# Patient Record
Sex: FEMALE | Employment: UNEMPLOYED | URBAN - METROPOLITAN AREA
[De-identification: names, ages, dates, MRNs, and addresses within clinical notes are randomized per-mention and may not be internally consistent; named-entity substitution may affect disease eponyms.]

---

## 2018-06-14 DIAGNOSIS — E78.00 PURE HYPERCHOLESTEROLEMIA: ICD-10-CM

## 2018-06-14 DIAGNOSIS — E11.9 TYPE 2 DIABETES MELLITUS WITHOUT COMPLICATION, WITHOUT LONG-TERM CURRENT USE OF INSULIN (HCC): ICD-10-CM

## 2018-06-15 RX ORDER — ATORVASTATIN CALCIUM 20 MG/1
20 TABLET, FILM COATED ORAL DAILY
Qty: 30 TABLET | Refills: 3 | Status: SHIPPED | OUTPATIENT
Start: 2018-06-15 | End: 2019-01-16 | Stop reason: SDUPTHER

## 2018-07-27 ENCOUNTER — OFFICE VISIT (OUTPATIENT)
Dept: INTERNAL MEDICINE | Age: 43
End: 2018-07-27

## 2018-07-27 VITALS
TEMPERATURE: 97.9 F | DIASTOLIC BLOOD PRESSURE: 78 MMHG | WEIGHT: 145 LBS | BODY MASS INDEX: 28.47 KG/M2 | RESPIRATION RATE: 18 BRPM | HEART RATE: 80 BPM | SYSTOLIC BLOOD PRESSURE: 112 MMHG | HEIGHT: 60 IN

## 2018-07-27 DIAGNOSIS — E78.5 HYPERLIPIDEMIA, UNSPECIFIED HYPERLIPIDEMIA TYPE: ICD-10-CM

## 2018-07-27 DIAGNOSIS — E11.9 TYPE 2 DIABETES MELLITUS WITHOUT COMPLICATION, WITHOUT LONG-TERM CURRENT USE OF INSULIN (HCC): Primary | ICD-10-CM

## 2018-07-27 PROCEDURE — 99213 OFFICE O/P EST LOW 20 MIN: CPT | Performed by: INTERNAL MEDICINE

## 2018-07-27 PROCEDURE — 99212 OFFICE O/P EST SF 10 MIN: CPT | Performed by: INTERNAL MEDICINE

## 2018-07-27 ASSESSMENT — ENCOUNTER SYMPTOMS
COUGH: 0
ABDOMINAL PAIN: 0
SHORTNESS OF BREATH: 0
ALLERGIC/IMMUNOLOGIC NEGATIVE: 1
TROUBLE SWALLOWING: 0
VOICE CHANGE: 0
EYES NEGATIVE: 1
SORE THROAT: 0
CONSTIPATION: 0
VOMITING: 0

## 2018-07-27 ASSESSMENT — PATIENT HEALTH QUESTIONNAIRE - PHQ9
1. LITTLE INTEREST OR PLEASURE IN DOING THINGS: 0
SUM OF ALL RESPONSES TO PHQ9 QUESTIONS 1 & 2: 0
SUM OF ALL RESPONSES TO PHQ QUESTIONS 1-9: 0
2. FEELING DOWN, DEPRESSED OR HOPELESS: 0

## 2018-07-27 NOTE — PROGRESS NOTES
Subjective:      Patient ID: Lopez Campos is a 37 y.o. female with PMH of DM type II and HLD. HPI  Patient presented to [facility] for routine follow up. She is [place] speaking lady. She has no complain today. Taking her medication regularly. DM type II- comtrolled   Patient on Metformin 500mg BD  Taking medication regularly  Last HbA1c:6.3( 1/25/2018)  Repeat HbA1c today:6.6  Monitoring BS at home, range over:102-110  Follow low glucose Diet  Eye exam :not follow any opthalmology ? Insurance issue  Last Foot exam done on 9/2017  microalbumin : neg  Concern about hypoglycemic symptom, counseled regarding symptom. HLD-controlled  Taking Lipitor 20mg OD  Last lipid :wnl    Review of Systems   Constitutional: Negative for activity change, fatigue, fever and unexpected weight change. HENT: Negative for sore throat, trouble swallowing and voice change. Eyes: Negative. Respiratory: Negative for cough and shortness of breath. Cardiovascular: Negative for chest pain and leg swelling. Gastrointestinal: Negative for abdominal pain, constipation and vomiting. Endocrine: Negative. Genitourinary: Negative. Musculoskeletal: Negative. Skin: Negative. Allergic/Immunologic: Negative. Neurological: Negative for tremors, weakness, light-headedness and numbness. Hematological: Negative for adenopathy. Psychiatric/Behavioral: Negative. Objective:   Physical Exam   Constitutional: She is oriented to person, place, and time. She appears well-developed and well-nourished. HENT:   Head: Normocephalic and atraumatic. Mouth/Throat: Oropharynx is clear and moist.   Eyes: Conjunctivae are normal. Pupils are equal, round, and reactive to light. No scleral icterus. Neck: Normal range of motion. Neck supple. Cardiovascular: Normal rate, regular rhythm and normal heart sounds. Pulmonary/Chest: Effort normal and breath sounds normal. She has no wheezes. Abdominal: Soft.  Bowel sounds are normal.

## 2018-07-27 NOTE — PATIENT INSTRUCTIONS
Continue to test blood sugars daily as instructed   Continue same medications   [address] [facility] worker to see you in re: to seeing an Eye Dr. As recommended earlier  Return in 6 months as scheduled with Dr. Rupinder Pettit please call in December to schedule an appointment

## 2019-01-16 DIAGNOSIS — E11.9 TYPE 2 DIABETES MELLITUS WITHOUT COMPLICATION, WITHOUT LONG-TERM CURRENT USE OF INSULIN (HCC): ICD-10-CM

## 2019-01-16 DIAGNOSIS — E78.00 PURE HYPERCHOLESTEROLEMIA: ICD-10-CM

## 2019-01-17 RX ORDER — ATORVASTATIN CALCIUM 20 MG/1
20 TABLET, FILM COATED ORAL DAILY
Qty: 30 TABLET | Refills: 0 | Status: SHIPPED | OUTPATIENT
Start: 2019-01-17 | End: 2019-01-24 | Stop reason: SDUPTHER

## 2019-01-24 ENCOUNTER — OFFICE VISIT (OUTPATIENT)
Dept: INTERNAL MEDICINE | Age: 44
End: 2019-01-24

## 2019-01-24 VITALS
SYSTOLIC BLOOD PRESSURE: 128 MMHG | BODY MASS INDEX: 26.43 KG/M2 | TEMPERATURE: 98.8 F | RESPIRATION RATE: 16 BRPM | DIASTOLIC BLOOD PRESSURE: 80 MMHG | HEART RATE: 86 BPM | WEIGHT: 140 LBS | HEIGHT: 61 IN

## 2019-01-24 DIAGNOSIS — E11.9 TYPE 2 DIABETES MELLITUS WITHOUT COMPLICATION, WITHOUT LONG-TERM CURRENT USE OF INSULIN (HCC): ICD-10-CM

## 2019-01-24 DIAGNOSIS — E78.00 PURE HYPERCHOLESTEROLEMIA: ICD-10-CM

## 2019-01-24 LAB — HBA1C MFR BLD: 6.5 %

## 2019-01-24 PROCEDURE — 83036 HEMOGLOBIN GLYCOSYLATED A1C: CPT | Performed by: INTERNAL MEDICINE

## 2019-01-24 PROCEDURE — 99213 OFFICE O/P EST LOW 20 MIN: CPT | Performed by: INTERNAL MEDICINE

## 2019-01-24 RX ORDER — ATORVASTATIN CALCIUM 20 MG/1
20 TABLET, FILM COATED ORAL DAILY
Qty: 30 TABLET | Refills: 3 | Status: SHIPPED | OUTPATIENT
Start: 2019-01-24 | End: 2019-05-14 | Stop reason: SDUPTHER

## 2019-01-24 RX ORDER — LANCETS 33 GAUGE
EACH MISCELLANEOUS
Qty: 100 EACH | Refills: 3 | Status: SHIPPED | OUTPATIENT
Start: 2019-01-24 | End: 2019-05-14 | Stop reason: SDUPTHER

## 2019-01-24 RX ORDER — SYRING-NEEDL,DISP,INSUL,0.3 ML 30 GX5/16"
SYRINGE, EMPTY DISPOSABLE MISCELLANEOUS
Qty: 100 DEVICE | Refills: 3 | Status: SHIPPED | OUTPATIENT
Start: 2019-01-24

## 2019-01-24 ASSESSMENT — ENCOUNTER SYMPTOMS
COUGH: 0
SORE THROAT: 0
SHORTNESS OF BREATH: 0
GASTROINTESTINAL NEGATIVE: 1
EYES NEGATIVE: 1

## 2019-02-15 ENCOUNTER — TELEPHONE (OUTPATIENT)
Dept: OBGYN | Age: 44
End: 2019-02-15

## 2019-04-02 ENCOUNTER — TELEPHONE (OUTPATIENT)
Dept: INTERNAL MEDICINE | Age: 44
End: 2019-04-02

## 2019-04-02 NOTE — TELEPHONE ENCOUNTER
Reached out to patient to complete Pre-Charting. Unable to reach to patient, left a voicemail for patient to call me back to review active labs.

## 2019-05-14 ENCOUNTER — OFFICE VISIT (OUTPATIENT)
Dept: INTERNAL MEDICINE | Age: 44
End: 2019-05-14

## 2019-05-14 VITALS
HEART RATE: 86 BPM | SYSTOLIC BLOOD PRESSURE: 128 MMHG | DIASTOLIC BLOOD PRESSURE: 92 MMHG | WEIGHT: 143.7 LBS | HEIGHT: 60 IN | TEMPERATURE: 98.2 F | RESPIRATION RATE: 14 BRPM | OXYGEN SATURATION: 99 % | BODY MASS INDEX: 28.21 KG/M2

## 2019-05-14 DIAGNOSIS — E66.3 OVERWEIGHT: ICD-10-CM

## 2019-05-14 DIAGNOSIS — E78.00 PURE HYPERCHOLESTEROLEMIA: ICD-10-CM

## 2019-05-14 DIAGNOSIS — E11.9 TYPE 2 DIABETES MELLITUS WITHOUT COMPLICATION, WITHOUT LONG-TERM CURRENT USE OF INSULIN (HCC): Primary | ICD-10-CM

## 2019-05-14 LAB — HBA1C MFR BLD: 6.7 %

## 2019-05-14 PROCEDURE — 99212 OFFICE O/P EST SF 10 MIN: CPT | Performed by: INTERNAL MEDICINE

## 2019-05-14 PROCEDURE — 83036 HEMOGLOBIN GLYCOSYLATED A1C: CPT | Performed by: INTERNAL MEDICINE

## 2019-05-14 PROCEDURE — 99213 OFFICE O/P EST LOW 20 MIN: CPT | Performed by: INTERNAL MEDICINE

## 2019-05-14 RX ORDER — ATORVASTATIN CALCIUM 20 MG/1
20 TABLET, FILM COATED ORAL DAILY
Qty: 30 TABLET | Refills: 3 | Status: SHIPPED | OUTPATIENT
Start: 2019-05-14 | End: 2019-07-26 | Stop reason: SDUPTHER

## 2019-05-14 RX ORDER — LANCETS 33 GAUGE
EACH MISCELLANEOUS
Qty: 100 EACH | Refills: 3 | Status: SHIPPED | OUTPATIENT
Start: 2019-05-14

## 2019-05-14 ASSESSMENT — ENCOUNTER SYMPTOMS
DIARRHEA: 0
CONSTIPATION: 0
BACK PAIN: 0
BLOOD IN STOOL: 0
SHORTNESS OF BREATH: 0
PHOTOPHOBIA: 0
COUGH: 0
SORE THROAT: 0

## 2019-05-14 NOTE — PATIENT INSTRUCTIONS
Please continue your medicine, watch on diet, continue exercise, loose weight. Come back in 3 months. Call for questions or concerns.     Electronically signed by Livier Nam MD on 5/14/2019 at 2:48 PM

## 2019-05-14 NOTE — PROGRESS NOTES
[facility]  InternalMedicine Residency Program  ACC Note      SUBJECTIVE:  CC: had concerns including Diabetes (follow up). HPI:Ness Dillard 37years old female with hx of type 2 diabetes not on insulin presented to the [facility] for a routine visit. 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 She has No complains today. Denies headache, chest pain, SOB. She checks her sugar at home, runs about 102-110. She watch on her diet. Her A1C has been stable, today 6.7    Review of Systems   Constitutional: Negative for chills, fever and unexpected weight change. HENT: Negative for sore throat. Eyes: Negative for photophobia and visual disturbance. Respiratory: Negative for cough and shortness of breath. Cardiovascular: Negative for chest pain and leg swelling. Gastrointestinal: Negative for blood in stool, constipation and diarrhea. Genitourinary: Negative for dysuria and frequency. Musculoskeletal: Negative for back pain. Neurological: Negative for dizziness and light-headedness. Current Outpatient Medications on File Prior to Visit   Medication Sig Dispense Refill    metFORMIN (GLUCOPHAGE) 500 MG tablet Take 1 tablet by mouth 2 times daily (with meals) 60 tablet 3    atorvastatin (LIPITOR) 20 MG tablet Take 1 tablet by mouth daily 30 tablet 3    Lancet Device MISC Pt to check BS daily 100 Device 3    AGAMATRIX ULTRA-THIN LANCETS MISC Check BS daily 100 each 3    blood glucose test strips (AGAMATRIX AMP TEST) strip Pt to check BS daily. 100 each 3    Blood Glucose Monitoring Suppl (AGAMATRIX AMP) SREE 1 glucometer 1 Device 0     No current facility-administered medications on file prior to visit. She reported she had diabetic eye exam in [facility] one month ago, will obtain record.         OBJECTIVE:    VS: BP (!) 155/80 (Site: Left Upper Arm, Position: Sitting, Cuff Size: Medium Adult)   Pulse 86   Temp 98.2 °F (36.8 °C) (Oral) Resp 14   Ht 5' (1.524 m)   Wt 143 lb 11.2 oz (65.2 kg)   LMP 05/07/2019 (Approximate)   SpO2 99%   Breastfeeding? No   BMI 28.06 kg/m²    Repeated BP manually was 128/92    Physical Exam   Constitutional: She is oriented to person, place, and time. She appears well-developed and well-nourished. No distress. HENT:   Head: Normocephalic and atraumatic. Eyes: Pupils are equal, round, and reactive to light. Neck: Normal range of motion. Cardiovascular: Normal rate and regular rhythm. Pulmonary/Chest: Effort normal and breath sounds normal. No respiratory distress. Abdominal: Soft. There is no tenderness. Genitourinary: Vagina normal.   Musculoskeletal: Normal range of motion. She exhibits no edema. Neurological: She is alert and oriented to person, place, and time. Skin: Skin is warm. She is not diaphoretic. Psychiatric: She has a normal mood and affect. ASSESSMENT/PLAN:  Reba Castillo was seen today for diabetes. Diagnoses and all orders for this visit:    Type 2 diabetes mellitus without complication, without long-term current use of insulin ([address]  -     Lipid Panel; Future  -     POCT glycosylated hemoglobin (Hb A1C)  -     metFORMIN (GLUCOPHAGE) 500 MG tablet; Take 1 tablet by mouth 2 times daily (with meals)  -     blood glucose test strips (AGAMATRIX AMP TEST) strip; Pt to check BS daily.  -     AGAMATRIX ULTRA-THIN LANCETS MISC; Check BS daily    Pure hypercholesterolemia  -     Lipid Panel; Future  -     atorvastatin (LIPITOR) 20 MG tablet; Take 1 tablet by mouth daily    Health Maintenance        -     Diabetic eye exam was done in [facility] last month, will obtain record        RTC: come back to clinic in 3 months for regular follow up.      I have reviewed my findings and recommendations with Marcus Finney and Dr Emperatriz Wilhelm MD PGY-2   5/14/2019 2:29 PM

## 2019-05-14 NOTE — PROGRESS NOTES
Discharge instructions reviewed with patient per Dr. Viki Arthur. Patient directed to  to  AVS and schedule follow up appt.

## 2019-05-27 ENCOUNTER — HOSPITAL ENCOUNTER (EMERGENCY)
Age: 44
Discharge: HOME | End: 2019-05-28
Attending: EMERGENCY MEDICINE

## 2019-05-27 ENCOUNTER — APPOINTMENT (OUTPATIENT)
Dept: CT IMAGING | Age: 44
End: 2019-05-27

## 2019-05-27 VITALS
WEIGHT: 145 LBS | HEART RATE: 91 BPM | SYSTOLIC BLOOD PRESSURE: 167 MMHG | TEMPERATURE: 98.6 F | RESPIRATION RATE: 16 BRPM | OXYGEN SATURATION: 99 % | BODY MASS INDEX: 28.47 KG/M2 | DIASTOLIC BLOOD PRESSURE: 78 MMHG | HEIGHT: 60 IN

## 2019-05-27 DIAGNOSIS — N30.01 ACUTE CYSTITIS WITH HEMATURIA: Primary | ICD-10-CM

## 2019-05-27 LAB
ANION GAP SERPL CALCULATED.3IONS-SCNC: 11 MMOL/L (ref 7–16)
BACTERIA: ABNORMAL /HPF
BILIRUBIN URINE: NEGATIVE
BLOOD, URINE: ABNORMAL
BUN BLDV-MCNC: 15 MG/DL (ref 6–20)
CALCIUM SERPL-MCNC: 9.5 MG/DL (ref 8.6–10.2)
CHLORIDE BLD-SCNC: 100 MMOL/L (ref 98–107)
CLARITY: ABNORMAL
CO2: 25 MMOL/L (ref 22–29)
COLOR: YELLOW
CREAT SERPL-MCNC: 0.7 MG/DL (ref 0.5–1)
GFR AFRICAN AMERICAN: >60
GFR NON-AFRICAN AMERICAN: >60 ML/MIN/1.73
GLUCOSE BLD-MCNC: 129 MG/DL (ref 74–99)
GLUCOSE URINE: NEGATIVE MG/DL
HCG, URINE, POC: NEGATIVE
KETONES, URINE: NEGATIVE MG/DL
LEUKOCYTE ESTERASE, URINE: ABNORMAL
Lab: NORMAL
NEGATIVE QC PASS/FAIL: NORMAL
NITRITE, URINE: NEGATIVE
PH UA: 7 (ref 5–9)
POSITIVE QC PASS/FAIL: NORMAL
POTASSIUM SERPL-SCNC: 3.6 MMOL/L (ref 3.5–5)
PROTEIN UA: ABNORMAL MG/DL
RBC UA: ABNORMAL /HPF (ref 0–2)
SODIUM BLD-SCNC: 136 MMOL/L (ref 132–146)
SPECIFIC GRAVITY UA: 1.01 (ref 1–1.03)
UROBILINOGEN, URINE: 0.2 E.U./DL
WBC UA: >20 /HPF (ref 0–5)

## 2019-05-27 PROCEDURE — 81001 URINALYSIS AUTO W/SCOPE: CPT

## 2019-05-27 PROCEDURE — 99284 EMERGENCY DEPT VISIT MOD MDM: CPT

## 2019-05-27 PROCEDURE — 2580000003 HC RX 258: Performed by: EMERGENCY MEDICINE

## 2019-05-27 PROCEDURE — 6360000002 HC RX W HCPCS: Performed by: EMERGENCY MEDICINE

## 2019-05-27 PROCEDURE — 74176 CT ABD & PELVIS W/O CONTRAST: CPT

## 2019-05-27 PROCEDURE — 87186 SC STD MICRODIL/AGAR DIL: CPT

## 2019-05-27 PROCEDURE — 80048 BASIC METABOLIC PNL TOTAL CA: CPT

## 2019-05-27 PROCEDURE — 87088 URINE BACTERIA CULTURE: CPT

## 2019-05-27 PROCEDURE — 87077 CULTURE AEROBIC IDENTIFY: CPT

## 2019-05-27 PROCEDURE — 96374 THER/PROPH/DIAG INJ IV PUSH: CPT

## 2019-05-27 RX ORDER — 0.9 % SODIUM CHLORIDE 0.9 %
500 INTRAVENOUS SOLUTION INTRAVENOUS ONCE
Status: COMPLETED | OUTPATIENT
Start: 2019-05-27 | End: 2019-05-28

## 2019-05-27 RX ORDER — KETOROLAC TROMETHAMINE 30 MG/ML
15 INJECTION, SOLUTION INTRAMUSCULAR; INTRAVENOUS ONCE
Status: COMPLETED | OUTPATIENT
Start: 2019-05-27 | End: 2019-05-27

## 2019-05-27 RX ADMIN — SODIUM CHLORIDE 500 ML: 9 INJECTION, SOLUTION INTRAVENOUS at 22:34

## 2019-05-27 RX ADMIN — KETOROLAC TROMETHAMINE 15 MG: 30 INJECTION INTRAMUSCULAR; INTRAVENOUS at 22:34

## 2019-05-27 ASSESSMENT — PAIN SCALES - GENERAL: PAINLEVEL_OUTOF10: 10

## 2019-05-27 ASSESSMENT — PAIN DESCRIPTION - PAIN TYPE: TYPE: ACUTE PAIN

## 2019-05-27 ASSESSMENT — PAIN DESCRIPTION - LOCATION: LOCATION: ABDOMEN

## 2019-05-27 ASSESSMENT — PAIN DESCRIPTION - FREQUENCY: FREQUENCY: CONTINUOUS

## 2019-05-27 ASSESSMENT — PAIN DESCRIPTION - ORIENTATION: ORIENTATION: LOWER

## 2019-05-27 ASSESSMENT — PAIN DESCRIPTION - DESCRIPTORS: DESCRIPTORS: ACHING

## 2019-05-28 PROCEDURE — 6370000000 HC RX 637 (ALT 250 FOR IP): Performed by: EMERGENCY MEDICINE

## 2019-05-28 RX ORDER — CEFDINIR 300 MG/1
300 CAPSULE ORAL 2 TIMES DAILY
Qty: 14 CAPSULE | Refills: 0 | Status: SHIPPED | OUTPATIENT
Start: 2019-05-28 | End: 2019-06-04

## 2019-05-28 RX ORDER — ONDANSETRON 8 MG/1
8 TABLET, ORALLY DISINTEGRATING ORAL EVERY 8 HOURS PRN
Qty: 10 TABLET | Refills: 1 | Status: SHIPPED | OUTPATIENT
Start: 2019-05-28 | End: 2020-01-22 | Stop reason: ALTCHOICE

## 2019-05-28 RX ORDER — IBUPROFEN 800 MG/1
800 TABLET ORAL EVERY 8 HOURS PRN
Qty: 20 TABLET | Refills: 0 | Status: SHIPPED | OUTPATIENT
Start: 2019-05-28 | End: 2020-01-22 | Stop reason: ALTCHOICE

## 2019-05-28 RX ORDER — CEFDINIR 300 MG/1
300 CAPSULE ORAL ONCE
Status: COMPLETED | OUTPATIENT
Start: 2019-05-28 | End: 2019-05-28

## 2019-05-28 RX ADMIN — CEFDINIR 300 MG: 300 CAPSULE ORAL at 00:26

## 2019-05-28 NOTE — ED PROVIDER NOTES
HPI:  5/28/19, Time: 12:09 AM      Annie Verdugo is a 53 y.o. female presenting to the ED for dysuria, beginning 2 days ago. The complaint has been intermittent, moderate in severity, and worsened by voiding. No fever/chills, no nausea/vomiting/diarrhea. No hematemesis, no melena no hematochezia. No flank pain reported. No change in bowel habit patterns. No trauma nor falls. No relieving factors are reported. No chest heaviness/pressure/tightness no coughing, no shortness breath. No other complaints are reported. Review of Systems:   Pertinent positives and negatives are stated within HPI, all other systems reviewed and are negative.      --------------------------------------------- PAST HISTORY ---------------------------------------------  Past Medical History:  has a past medical history of Diabetes ([address] and Hyperlipidemia. Past Surgical History:  has no past surgical history on file. Social History:  reports that she has never smoked. She has never used smokeless tobacco. She reports that she does not drink alcohol or use drugs. Family History: family history includes Diabetes in her father and mother; High Blood Pressure in her mother. The patients home medications have been reviewed. Allergies: Patient has no known allergies.     -------------------------------------------------- RESULTS -------------------------------------------------  All laboratory and radiology results have been personally reviewed by myself   LABS:  Results for orders placed or performed during the hospital encounter of 05/27/19   Urinalysis   Result Value Ref Range    Color, UA Yellow Straw/Yellow    Clarity, UA SL CLOUDY Clear    Glucose, Ur Negative Negative mg/dL    Bilirubin Urine Negative Negative    Ketones, Urine Negative Negative mg/dL    Specific Gravity, UA 1.010 1.005 - 1.030    Blood, Urine MODERATE (A) Negative    pH, UA 7.0 5.0 - 9.0    Protein, UA TRACE Negative mg/dL    Urobilinogen, Urine 0.2 <2.0 E. U./dL    Nitrite, Urine Negative Negative    Leukocyte Esterase, Urine SMALL (A) Negative   Basic Metabolic Panel   Result Value Ref Range    Sodium 136 132 - 146 mmol/L    Potassium 3.6 3.5 - 5.0 mmol/L    Chloride 100 98 - 107 mmol/L    CO2 25 22 - 29 mmol/L    Anion Gap 11 7 - 16 mmol/L    Glucose 129 (H) 74 - 99 mg/dL    BUN 15 6 - 20 mg/dL    CREATININE 0.7 0.5 - 1.0 mg/dL    GFR Non-African American >60 >=60 mL/min/1.73    GFR African American >60     Calcium 9.5 8.6 - 10.2 mg/dL   Microscopic Urinalysis   Result Value Ref Range    WBC, UA >20 0 - 5 /HPF    RBC, UA 10-20 (A) 0 - 2 /HPF    Bacteria, UA RARE (A) /HPF   POC Pregnancy Urine Qual   Result Value Ref Range    HCG, Urine, POC Negative Negative    Lot Number [id number]     Positive QC Pass/Fail Pass     Negative QC Pass/Fail Pass        RADIOLOGY:  Interpreted by Radiologist.  CT ABDOMEN PELVIS WO CONTRAST Additional Contrast? None   Final Result   Mild thickening of the bladder wall to be correlated clinically. Otherwise unremarkable CT abdomen and pelvis without IV contrast.          ------------------------- NURSING NOTES AND VITALS REVIEWED ---------------------------    The nursing notes within the ED encounter and vital signs as below have been reviewed. BP (!) 167/78   Pulse 91   Temp 98.6 °F (37 °C) (Oral)   Resp 16   Ht 5' (1.524 m)   Wt 145 lb (65.8 kg)   LMP 05/07/2019 (Approximate)   SpO2 99%   BMI 28.32 kg/m²   Oxygen Saturation Interpretation: Normal      ---------------------------------------------------PHYSICAL EXAM--------------------------------------      Constitutional/General: Alert and oriented x3, well appearing, non toxic in mild distress  Head: Normocephalic and atraumatic  Eyes: PERRL, EOMI  Mouth: Oropharynx clear, handling secretions, no trismus  Neck: Supple, full ROM,   Pulmonary: Lungs clear to auscultation bilaterally, no wheezes, rales, or rhonchi.  Not in respiratory distress  Cardiovascular:  Regular rate and rhythm, no murmurs, gallops, or rubs. 2+ distal pulses  Abdomen: Soft, non tender, non distended, No HPSM, no masses, no rebound, no guarding, no rigidity. Normal BS. No CVA Tenderness bilaterally  Extremities: Moves all extremities x 4. Warm and well perfused  Skin: warm and dry without rash  Neurologic: GCS 15, CN's grossly intact, no focal deficits. Psych: Normal Affect    ------------------------------ ED COURSE/MEDICAL DECISION MAKING----------------------  Medications   cefdinir (OMNICEF) capsule 300 mg (has no administration in time range)   0.9 % sodium chloride bolus (500 mLs Intravenous New Bag 5/27/19 2234)   ketorolac (TORADOL) injection 15 mg (15 mg Intravenous Given 5/27/19 2234)       ED COURSE:     Medical Decision Making:   Differential Diagnoses:  UTI, Ureteral Calculus, Pyelonephritis, Acute Kidney Injury, to name a few. Pt. Has no CVA tenderness on exam to suggest Pyelonephritis. Counseling: The emergency provider has spoken with the patient and discussed todays results, in addition to providing specific details for the plan of care and counseling regarding the diagnosis and prognosis. Questions are answered at this time and they are agreeable with the plan.      --------------------------------- IMPRESSION AND DISPOSITION ---------------------------------    IMPRESSION  1. Acute cystitis with hematuria        DISPOSITION  Disposition: Discharge to home  Patient condition is stable      NOTE: This report was transcribed using voice recognition software.  Every effort was made to ensure accuracy; however, inadvertent computerized transcription errors may be present        Loy Padilla MD  05/28/19 0015

## 2019-05-30 LAB
ORGANISM: ABNORMAL
URINE CULTURE, ROUTINE: ABNORMAL
URINE CULTURE, ROUTINE: ABNORMAL

## 2019-07-09 ENCOUNTER — TELEPHONE (OUTPATIENT)
Dept: INTERNAL MEDICINE | Age: 44
End: 2019-07-09

## 2019-07-09 DIAGNOSIS — E78.00 PURE HYPERCHOLESTEROLEMIA: ICD-10-CM

## 2019-07-09 DIAGNOSIS — E11.9 TYPE 2 DIABETES MELLITUS WITHOUT COMPLICATION, WITHOUT LONG-TERM CURRENT USE OF INSULIN (HCC): ICD-10-CM

## 2019-07-09 RX ORDER — ATORVASTATIN CALCIUM 20 MG/1
20 TABLET, FILM COATED ORAL DAILY
Qty: 30 TABLET | Status: CANCELLED | OUTPATIENT
Start: 2019-07-09

## 2019-07-19 ENCOUNTER — HOSPITAL ENCOUNTER (OUTPATIENT)
Age: 44
Discharge: HOME | End: 2019-07-19

## 2019-07-19 DIAGNOSIS — E11.9 TYPE 2 DIABETES MELLITUS WITHOUT COMPLICATION, WITHOUT LONG-TERM CURRENT USE OF INSULIN (HCC): ICD-10-CM

## 2019-07-19 DIAGNOSIS — E78.00 PURE HYPERCHOLESTEROLEMIA: ICD-10-CM

## 2019-07-19 LAB
CHOLESTEROL, TOTAL: 149 MG/DL (ref 0–199)
HDLC SERPL-MCNC: 54 MG/DL
LDL CHOLESTEROL CALCULATED: 64 MG/DL (ref 0–99)
TRIGL SERPL-MCNC: 153 MG/DL (ref 0–149)
VLDLC SERPL CALC-MCNC: 31 MG/DL

## 2019-07-19 PROCEDURE — 80061 LIPID PANEL: CPT

## 2019-07-19 PROCEDURE — 36415 COLL VENOUS BLD VENIPUNCTURE: CPT

## 2019-07-26 ENCOUNTER — OFFICE VISIT (OUTPATIENT)
Dept: INTERNAL MEDICINE | Age: 44
End: 2019-07-26

## 2019-07-26 VITALS
WEIGHT: 144.2 LBS | DIASTOLIC BLOOD PRESSURE: 78 MMHG | TEMPERATURE: 98.2 F | HEIGHT: 61 IN | SYSTOLIC BLOOD PRESSURE: 131 MMHG | RESPIRATION RATE: 18 BRPM | HEART RATE: 76 BPM | BODY MASS INDEX: 27.23 KG/M2

## 2019-07-26 DIAGNOSIS — E78.00 PURE HYPERCHOLESTEROLEMIA: ICD-10-CM

## 2019-07-26 DIAGNOSIS — E11.9 TYPE 2 DIABETES MELLITUS WITHOUT COMPLICATION, WITHOUT LONG-TERM CURRENT USE OF INSULIN (HCC): ICD-10-CM

## 2019-07-26 PROCEDURE — 99212 OFFICE O/P EST SF 10 MIN: CPT | Performed by: INTERNAL MEDICINE

## 2019-07-26 PROCEDURE — 99213 OFFICE O/P EST LOW 20 MIN: CPT | Performed by: INTERNAL MEDICINE

## 2019-07-26 RX ORDER — ATORVASTATIN CALCIUM 20 MG/1
20 TABLET, FILM COATED ORAL DAILY
Qty: 30 TABLET | Refills: 3 | Status: SHIPPED | OUTPATIENT
Start: 2019-07-26 | End: 2019-11-25 | Stop reason: SDUPTHER

## 2019-07-26 RX ORDER — IBUPROFEN 800 MG/1
800 TABLET ORAL EVERY 8 HOURS PRN
Qty: 20 TABLET | Status: CANCELLED | OUTPATIENT
Start: 2019-07-26 | End: 2019-08-02

## 2019-07-26 ASSESSMENT — ENCOUNTER SYMPTOMS
SHORTNESS OF BREATH: 0
COUGH: 0
BACK PAIN: 0
ABDOMINAL PAIN: 0

## 2019-07-29 PROBLEM — E11.9 TYPE 2 DIABETES MELLITUS WITHOUT COMPLICATION, WITHOUT LONG-TERM CURRENT USE OF INSULIN (HCC): Status: ACTIVE | Noted: 2019-07-29

## 2019-07-29 PROBLEM — E78.00 PURE HYPERCHOLESTEROLEMIA: Status: ACTIVE | Noted: 2019-07-29

## 2019-11-25 ENCOUNTER — TELEPHONE (OUTPATIENT)
Dept: INTERNAL MEDICINE | Age: 44
End: 2019-11-25

## 2019-11-25 DIAGNOSIS — E11.9 TYPE 2 DIABETES MELLITUS WITHOUT COMPLICATION, WITHOUT LONG-TERM CURRENT USE OF INSULIN (HCC): ICD-10-CM

## 2019-11-25 DIAGNOSIS — E78.00 PURE HYPERCHOLESTEROLEMIA: ICD-10-CM

## 2019-11-26 RX ORDER — ATORVASTATIN CALCIUM 20 MG/1
20 TABLET, FILM COATED ORAL DAILY
Qty: 30 TABLET | Refills: 1 | Status: SHIPPED
Start: 2019-11-26 | End: 2020-02-06 | Stop reason: SDUPTHER

## 2019-12-03 ENCOUNTER — TELEPHONE (OUTPATIENT)
Dept: INTERNAL MEDICINE | Age: 44
End: 2019-12-03

## 2020-01-22 ENCOUNTER — OFFICE VISIT (OUTPATIENT)
Dept: INTERNAL MEDICINE | Age: 45
End: 2020-01-22

## 2020-01-22 VITALS
HEART RATE: 88 BPM | HEIGHT: 61 IN | SYSTOLIC BLOOD PRESSURE: 145 MMHG | RESPIRATION RATE: 18 BRPM | BODY MASS INDEX: 27.17 KG/M2 | TEMPERATURE: 98.9 F | DIASTOLIC BLOOD PRESSURE: 86 MMHG | WEIGHT: 143.9 LBS

## 2020-01-22 LAB — HBA1C MFR BLD: 6.6 %

## 2020-01-22 PROCEDURE — 99213 OFFICE O/P EST LOW 20 MIN: CPT | Performed by: INTERNAL MEDICINE

## 2020-01-22 PROCEDURE — 6360000002 HC RX W HCPCS

## 2020-01-22 PROCEDURE — 83036 HEMOGLOBIN GLYCOSYLATED A1C: CPT | Performed by: INTERNAL MEDICINE

## 2020-01-22 PROCEDURE — 90686 IIV4 VACC NO PRSV 0.5 ML IM: CPT

## 2020-01-22 PROCEDURE — G0008 ADMIN INFLUENZA VIRUS VAC: HCPCS

## 2020-01-22 PROCEDURE — 99212 OFFICE O/P EST SF 10 MIN: CPT | Performed by: INTERNAL MEDICINE

## 2020-01-22 RX ORDER — AMLODIPINE BESYLATE 5 MG/1
5 TABLET ORAL DAILY
Qty: 30 TABLET | Refills: 3 | Status: SHIPPED
Start: 2020-01-22 | End: 2020-03-18 | Stop reason: SDUPTHER

## 2020-01-22 RX ORDER — BLOOD PRESSURE TEST KIT
KIT MISCELLANEOUS
Qty: 1 KIT | Refills: 0 | Status: SHIPPED | OUTPATIENT
Start: 2020-01-22

## 2020-01-22 RX ORDER — BLOOD PRESSURE TEST KIT
KIT MISCELLANEOUS
Qty: 1 KIT | Refills: 0 | Status: SHIPPED | OUTPATIENT
Start: 2020-01-22 | End: 2020-01-22

## 2020-01-22 SDOH — ECONOMIC STABILITY: INCOME INSECURITY: HOW HARD IS IT FOR YOU TO PAY FOR THE VERY BASICS LIKE FOOD, HOUSING, MEDICAL CARE, AND HEATING?: NOT VERY HARD

## 2020-01-22 SDOH — ECONOMIC STABILITY: FOOD INSECURITY: WITHIN THE PAST 12 MONTHS, YOU WORRIED THAT YOUR FOOD WOULD RUN OUT BEFORE YOU GOT MONEY TO BUY MORE.: NEVER TRUE

## 2020-01-22 SDOH — ECONOMIC STABILITY: FOOD INSECURITY: WITHIN THE PAST 12 MONTHS, THE FOOD YOU BOUGHT JUST DIDN'T LAST AND YOU DIDN'T HAVE MONEY TO GET MORE.: NEVER TRUE

## 2020-01-22 ASSESSMENT — PATIENT HEALTH QUESTIONNAIRE - PHQ9
1. LITTLE INTEREST OR PLEASURE IN DOING THINGS: 0
2. FEELING DOWN, DEPRESSED OR HOPELESS: 0
SUM OF ALL RESPONSES TO PHQ QUESTIONS 1-9: 0
SUM OF ALL RESPONSES TO PHQ9 QUESTIONS 1 & 2: 0
SUM OF ALL RESPONSES TO PHQ QUESTIONS 1-9: 0

## 2020-01-22 ASSESSMENT — ENCOUNTER SYMPTOMS
BACK PAIN: 0
VOMITING: 0
NAUSEA: 0
ABDOMINAL PAIN: 0
RECTAL PAIN: 0
COUGH: 0
DIARRHEA: 0

## 2020-01-22 NOTE — PROGRESS NOTES
Vaccine Information Sheet, \"Influenza - Inactivated\"  given to [facility], or parent/legal guardian of  [facility] and verbalized understanding. Patient responses:    Have you ever had a reaction to a flu vaccine? No  Do you have any current illness? No  Have you ever had [place] Syndrome? No  Do you have a serious allergy to any of the follow: Neomycin, Polymyxin, Thimerosal, eggs or egg products? No    Flu vaccine given per order. Please see immunization tab. Risks and benefits explained. Current VIS given.  Yoshi Almazan #[id number] used to gather information on pt and Dr to do assessment       Patient verbalized understanding of office instructions. She will call with questions or concerns. She was given discharge instructions,  Flu vaccine per ordered without no distress noted printed VIS given in [place]  All questions were fully answered.  Instructed to stop at  for printed AVS in [place]

## 2020-01-22 NOTE — PROGRESS NOTES
[facility]  InternalMedicine Residency Program  [facility] Note      SUBJECTIVE:    Andre العراقي presented to the [facility] for a routine visit     number: [id number]    Complaints today include occasional  headache., she happened to check her blood pressure at that time and has noticed higher systolic reading in the 852'D . Otherwise patient states no major issues since last visit. Review of Systems   Constitutional: Negative for fatigue and unexpected weight change. HENT: Negative for congestion. Eyes: Negative for visual disturbance. Respiratory: Negative for cough. Cardiovascular: Negative for chest pain and leg swelling. Gastrointestinal: Negative for abdominal pain, diarrhea, nausea, rectal pain and vomiting. Genitourinary: Negative for difficulty urinating, frequency and menstrual problem. Musculoskeletal: Negative for back pain and joint swelling. Neurological: Positive for headaches. Negative for dizziness, tremors, weakness and numbness. Psychiatric/Behavioral: Negative for agitation. Current Outpatient Medications on File Prior to Visit   Medication Sig Dispense Refill    atorvastatin (LIPITOR) 20 MG tablet Take 1 tablet by mouth daily 30 tablet 1    blood glucose test strips (AGAMATRIX AMP TEST) strip Pt to check BS daily. 100 each 3    AGAMATRIX ULTRA-THIN LANCETS MISC Check BS daily 100 each 3    Lancet Device MISC Pt to check BS daily 100 Device 3    Blood Glucose Monitoring Suppl (AGAMATRIX AMP) SREE 1 glucometer 1 Device 0     No current facility-administered medications on file prior to visit. OBJECTIVE:    VS: BP (!) 145/86   Pulse 88   Temp 98.9 °F (37.2 °C) (Oral)   Resp 18   Ht 5' 1\" (1.549 m)   Wt 143 lb 14.4 oz (65.3 kg)   LMP 01/07/2020   BMI 27.19 kg/m²   Physical Exam  Constitutional:       Appearance: Normal appearance. HENT:      Head: Normocephalic and atraumatic.       Right Ear: Tympanic membrane normal.

## 2020-02-06 RX ORDER — ATORVASTATIN CALCIUM 20 MG/1
20 TABLET, FILM COATED ORAL DAILY
Qty: 30 TABLET | Refills: 1 | Status: SHIPPED
Start: 2020-02-06 | End: 2020-02-12 | Stop reason: SDUPTHER

## 2020-02-12 ENCOUNTER — TELEPHONE (OUTPATIENT)
Dept: INTERNAL MEDICINE | Age: 45
End: 2020-02-12

## 2020-02-12 RX ORDER — ATORVASTATIN CALCIUM 20 MG/1
20 TABLET, FILM COATED ORAL DAILY
Qty: 30 TABLET | Refills: 1 | Status: SHIPPED
Start: 2020-02-12 | End: 2020-03-18 | Stop reason: SDUPTHER

## 2020-03-18 RX ORDER — AMLODIPINE BESYLATE 5 MG/1
5 TABLET ORAL DAILY
Qty: 30 TABLET | Refills: 3 | Status: SHIPPED
Start: 2020-03-18 | End: 2020-08-26 | Stop reason: ALTCHOICE

## 2020-03-18 RX ORDER — ATORVASTATIN CALCIUM 20 MG/1
20 TABLET, FILM COATED ORAL DAILY
Qty: 30 TABLET | Refills: 3 | Status: SHIPPED
Start: 2020-03-18 | End: 2020-08-26 | Stop reason: ALTCHOICE

## 2020-03-18 NOTE — TELEPHONE ENCOUNTER
Last Appointment:  1/22/2020  No future appointments.     Patient would like 90 supplies on medications

## 2020-03-18 NOTE — TELEPHONE ENCOUNTER
Patient LM at 12:08. Refills needed Atorvastatin, Metformin and Amlodipine.   [facility] 7430

## 2020-03-23 ENCOUNTER — TELEPHONE (OUTPATIENT)
Dept: INTERNAL MEDICINE | Age: 45
End: 2020-03-23

## 2020-08-25 ENCOUNTER — TELEPHONE (OUTPATIENT)
Dept: INTERNAL MEDICINE | Age: 45
End: 2020-08-25

## 2020-08-25 NOTE — PROGRESS NOTES
[facility]  [facility] Note      SUBJECTIVE:  CC: had concerns including Diabetes and Medication Refill. Adore Chi presented to the [facility] for a routine visit for follow up of DM and HTN   no: [id number]  She stated that she is feeling well today. Systolic blood pressure ranging around 110-120 mmHg. Blood sugar at home ranging between 100-120 mg/dl. No hypoglycemia. HbA1C 6.5% today    She stopped taking amlodipine 5 months ago due to side effects- leg swelling, facial flushing. She denies any nausea, vomiting, headache, dizziness, chest pain    Review Of Systems:  General: no fevers, chills, weight loss or gain. Ears/Nose/Throat: no hearing loss, tinnitus, vertigo, nosebleed, nasal congestion, rhinorrhea, sore throat  Respiratory: no cough, pleuritic chest pain, dyspnea, or wheezing  Cardiovascular: no chest pain, angina, dyspnea on exertion, orthopnea, PND, palpitations, or claudication  Gastrointestinal: no nausea, vomiting, heartburn, diarrhea, constipation, abdominal pain, hematochezia or melena  Genitourinary: no urinary urgency, frequency, dysuria, nocturia, hesitancy, or incontinence  Musculoskeletal: no arthritis, arthralgia, myalgia, weakness, or morning stiffness  Skin: no abnormal pigmentation, rash, itching, masses, hair or nail changes    Outpatient Medications Marked as Taking for the 8/26/20 encounter (Office Visit) with Anand Green MD   Medication Sig Dispense Refill    metFORMIN (GLUCOPHAGE) 500 MG tablet Take 1 tablet by mouth 2 times daily (with meals) 60 tablet 3    atorvastatin (LIPITOR) 40 MG tablet Take 1 tablet by mouth daily 90 tablet 1    lisinopril (PRINIVIL;ZESTRIL) 5 MG tablet Take 1 tablet by mouth daily 30 tablet 3       I have reviewed all pertinent PMHx, PSHx, FamHx, SocialHx, medications, and allergies and updated history as appropriate.     OBJECTIVE:    VS: /81   Pulse 68   Temp 97.7 °F (36.5 °C) (Temporal)   Resp 16   Ht 5' 1\" (1.549 m)   Wt 145 lb (65.8 kg)   BMI 27.40 kg/m²   General appearance: Alert, Awake, Oriented times 3, no distress  Lungs: Lungs clear to auscultation bilaterally. No rhonchi, crackles or wheezes  Heart: S1 S2  Regular rate and rhythm. No rub, murmur or gallop  Abdomen: Abdomen soft, non-tender. non-distended BS normal. No masses, organomegaly, no guarding rebound or rigidity. Extremities: No edema, Peripheral pulses palpable 2/4  Neuro: alert, awake, no gross focal neurologic deficit    ASSESSMENT/PLAN:  Type II DM  Controlled  HbA1c at goal- repeat today: 6.6%--> 6.5% today (8/2020)  Will check MICROALBUMIN / CREATININE URINE RATIO  Continue Metformin 500 mg twice daily  Will check BASIC METABOLIC PANEL  Diabetic Foot Exam done today, 10/10, normal  Continue lifestyle modifications      Essential Hypertension  Blood pressure today 200/99 mmHg  Systolic blood pressure at home usually <130 mmHg with some reading >140 mmHg  Was started on Amlodipine in 1/2020 but she stopped taking after one month due to bilateral leg swelling and facial flushing  Started on Lisinopril 5 mg daily; will also be power protective  Advised to check blood pressure regularly at home    Hyperlipidemia   High intensity statin continued  Last LDL 64 (2019)  Will check lipid panel  Atorvastatin increased to 40 mg from 20 mg today     Breast cancer screening  EWELINA DIGITAL DIAGNOSTIC BILATERAL PER PROTOCOL; Future    Overweight (BMI 25.0-29. 9)  Advised on life style modification    Health Maintenance:  Diabetic foot exam (8/26/2020)  Diabetic eye exam: 2 years ago; advised to follow up  Hep B Vaccine Adult (ENGERIX-B) (8/26/2020)  Health Maintenance Due   Topic Date Due    Diabetic retinal exam  05/28/1983    Diabetic microalbuminuria test  01/25/2019    Potassium monitoring  05/27/2020    Creatinine monitoring  05/27/2020    Lipid screen  07/19/2020     RTC: in 4 weeks for blood pressure check and labs    I have reviewed my findings and recommendations with Paco Rueda and Dr Tiffany Ray MD PGY-2  8/26/2020 12:31 PM

## 2020-08-25 NOTE — TELEPHONE ENCOUNTER
Pre-visit planning call to discuss patient care during COVID-19 pandemic. Discussed with the patient  / left message. Offered video and e-visits through [employer] to facilitate patient care continuity. Last office visit date: 1/22/2020  Outstanding labs/imaging: reviewed  Patient transport: community transport (remind patient to call to cancel transport) / private vehicle    Discussed that if the patient develops viral symptoms (fever, chills, body aches, soar throat, abdominal pain or diarrhea) OR has exposure to a sick contact they are to call the [facility] at [phone] for evaluation at one of three locations:    [facility] at [address]: [place], [address]    Patient agreed to telephone encounter and this will be forwarded to clerical staff for scheduling.     [facility]

## 2020-08-26 ENCOUNTER — OFFICE VISIT (OUTPATIENT)
Dept: INTERNAL MEDICINE | Age: 45
End: 2020-08-26

## 2020-08-26 VITALS
DIASTOLIC BLOOD PRESSURE: 81 MMHG | WEIGHT: 145 LBS | BODY MASS INDEX: 27.38 KG/M2 | SYSTOLIC BLOOD PRESSURE: 130 MMHG | HEIGHT: 61 IN | RESPIRATION RATE: 16 BRPM | TEMPERATURE: 97.7 F | HEART RATE: 68 BPM

## 2020-08-26 PROBLEM — I10 ESSENTIAL HYPERTENSION: Status: ACTIVE | Noted: 2020-08-26

## 2020-08-26 LAB — HBA1C MFR BLD: 6.5 %

## 2020-08-26 PROCEDURE — 99212 OFFICE O/P EST SF 10 MIN: CPT | Performed by: INTERNAL MEDICINE

## 2020-08-26 PROCEDURE — G0010 ADMIN HEPATITIS B VACCINE: HCPCS

## 2020-08-26 PROCEDURE — 90740 HEPB VACC 3 DOSE IMMUNSUP IM: CPT

## 2020-08-26 PROCEDURE — 6360000002 HC RX W HCPCS

## 2020-08-26 PROCEDURE — 99214 OFFICE O/P EST MOD 30 MIN: CPT | Performed by: INTERNAL MEDICINE

## 2020-08-26 PROCEDURE — 83036 HEMOGLOBIN GLYCOSYLATED A1C: CPT | Performed by: INTERNAL MEDICINE

## 2020-08-26 RX ORDER — ATORVASTATIN CALCIUM 40 MG/1
40 TABLET, FILM COATED ORAL DAILY
Qty: 90 TABLET | Refills: 1 | Status: SHIPPED
Start: 2020-08-26 | End: 2020-12-09 | Stop reason: SDUPTHER

## 2020-08-26 RX ORDER — LISINOPRIL 5 MG/1
5 TABLET ORAL DAILY
Qty: 30 TABLET | Refills: 3 | Status: SHIPPED
Start: 2020-08-26 | End: 2020-12-09 | Stop reason: ALTCHOICE

## 2020-08-26 NOTE — PROGRESS NOTES
I saw and examined the patient with Dr. Courtney Aragon. Patient here for Hypertension and blood pressure check. She has been checking her blood sugars at home and they are running between 100s-130s. She is also measuring her pressures at home and they are running between SBP 120s-140s. She denies any headaches, visual changes, chest pains, shortness of breath, nausea, vomiting or abdominal pain. She is complaint with her medications. Past medical, surgical, family history reviewed. Medications and allergies reviewed. Exam as per resident exam which reflects my own exam.    Vitals:    08/26/20 0807   BP: 130/81   Pulse: 68   Resp: 16   Temp: 97.7 °F (36.5 °C)   TempSrc: Temporal   Weight: 145 lb (65.8 kg)   Height: 5' 1\" (1.549 m)       I reviewed patient labs. Assessment/Plan:  1. Diabetes Mellitus Type II controlled non insulin dependent  2. Hypertension  3. Hyperlipidemia  4. Overweight  5. Breast cancer screening     Hba1c <7 today. Continue on metformin. Check lipid panel and bmp as well as microalbumin. Patient had a foot exam today and will be getting her eye exam next month with ophthalmology. Check mammogram. Will give hepatitis b vaccine today. Discussed assessment and plan with resident. See their note for further details.      Sarah Ann, DO

## 2020-08-26 NOTE — PATIENT INSTRUCTIONS
· Be compliant with medications  · Start taking Lisinopril 5 mg daily  · Start taking Atorvastatin 40 mg daily  · Please have labs done before coming to next visit  · Check blood pressure at home and maintain a blood pressure log and bring the log to the next clinic appointment  · Follow up in 4 weeks   Patient Education        Presión arterial larry: Instrucciones de cuidado  High Blood Pressure: Care Instructions  Instrucciones de cuidado    Si billings presión arterial suele ser superior a 130/80, usted tiene presión arterial larry o hipertensión. [place] significa que el número de [place] es 130 o más alto o que el número de abajo es [de-identified] o [address], o ambas cosas. A pesar de lo que mucha gente garrett, la hipertensión no suele causar dolor de perez ni provocar mareos o aturdimiento. Generalmente, no presenta síntomas. Sin embargo, aumenta el riesgo de ataque al corazón, ataque cerebral, y daños en los riñones o en los ojos. A mayor presión arterial, mayor riesgo. Billings médico le dará christianne meta para billings presión arterial. Billings meta se basará en billings elsie y billings edad. Los cambios de estilo de virginie, rey alimentarse en forma saludable y [place], siempre son importantes para ayudarle a bajar la presión arterial. También podría citlalli medicamentos para lograr billings meta para la presión arterial.  La atención de seguimiento es christianne parte clave de billings tratamiento y seguridad. Asegúrese de hacer y acudir a todas las citas, y llame a billings médico si está teniendo problemas. También es christianne buena idea saber los resultados de pawan exámenes y mantener christianne lista de los medicamentos que padmaja. ¿Cómo puede cuidarse en el hogar? Tratamiento médico  Si janel de citlalli pawan medicamentos, billings presión arterial volverá a subir. Es posible que deba citlalli un tipo de [place], o más de [place], para reducir la presión arterial. Sea justin con los medicamentos. [place] billings medicamento exactamente rey se lo hayan indicado.  Llame a billings médico si garrett estar teniendo problemas con billings medicamento. Hable con billings médico antes de empezar a citlalli [employer]. La aspirina puede ayudar a determinadas personas a reducir billings riesgo de tener un ataque cardíaco o un ataque cerebral. Bruno citlalli aspirina no es adecuado para todo el TRENGEREID, debido a que puede causar sangrado grave. Visite a billings médico periódicamente. Usted podría necesitar consultar a billings médico con más frecuencia al principio o hasta que se reduzca billings presión arterial.  Si usted está tomando medicamentos para la presión arterial, hable con billings médico antes de citlalli medicamentos descongestionantes o antiinflamatorios, rey ibuprofeno. Algunos de estos medicamentos pueden elevar la presión arterial.  Aprenda a revisarse la presión arterial en billings hogar. Cambios en el estilo de virginie  Mantenga un peso saludable. [place] es particularmente importante si aumenta de peso en la sebastian de la cintura. Bajar solo 10 libras (4.5 kg) puede ayudarle a reducir billings presión arterial.  Abelardo más ejercicios si billings médico se lo recomienda. Caminar es christianne buena opción. Poco a poco, aumente la distancia que [employer]. Intente hacer por lo menos 30 minutos de ejercicio la mayoría de los días de la [place]. También podría nadar, montar en bicicleta o hacer otras actividades. No tome alcohol o limite la cantidad que quinn. Hable con billings médico acerca de si puede citlalli alcohol. Trate de limitar la cantidad de sodio que consume a menos de 2,300 miligramos (mg) al día. Billings médico podría pedirle que trate de consumir menos de 1,500 mg al día. Coma abundantes frutas (rey bananas [plátanos] y naranjas), verduras, legumbres, granos integrales y productos lácteos de bajo contenido de Dyll[place] dewayne. Reduzca la cantidad de grasas saturadas en billings Avie Meena. Los productos derivados de los [place], rey la [place], el queso y la carne, contienen grasas saturadas.  El limitar estos alimentos podría ayudarle a bajar de peso y [place] reducir billings riesgo de christianne enfermedad cardíaca. No fume. El hábito de fumar aumenta billings riesgo de ataque cerebral y ataque al corazón. Si necesita ayuda para dejar de fumar, hable con billings médico sobre programas y medicamentos para dejar de fumar. Estos pueden aumentar pawan probabilidades de dejar el hábito para siempre. ¿Cuándo debe pedir ayuda? Llame al  911 en cualquier momento que considere que necesita atención de [place]. [place] puede significar que tiene síntomas que sugieren que billings presión arterial le está causando un problema cardíaco o vascular grave. Billings presión arterial podría ser superior a 180/110. Por ejemplo, llame al 911 si:  Tiene síntomas de un ataque al corazón. Estos pueden incluir:  Dolor o presión en el pecho, o christianne sensación extraña en el pecho. Sudoración. Falta de aire. Náuseas o vómito. Dolor, presión o christianne sensación extraña en la espalda, el bijan, la mandíbula, la parte superior del abdomen o en mark o ambos hombros o brazos. Aturdimiento o debilidad repentina. Latidos del corazón rápidos o irregulares. Tiene síntomas de un ataque cerebral. Estos pueden incluir:  Entumecimiento, hormigueo, debilidad o parálisis repentinos en la [place], el brazo o la pierna, sobre todo en un solo lado del cuerpo. Cambios repentinos en la visión. Dificultades repentinas para hablar. Confusión repentina o dificultad súbita para comprender frases sencillas. Problemas repentinos para caminar o mantener el equilibrio. Dolor de [place] intenso y repentino, distinto de los samantha de [place]. Tiene un dolor intenso en la espalda o el abdomen. No espere a que la presión arterial baje por sí bill. Obtenga ayuda de inmediato. Llame a billings médico ahora mismo o busque atención de inmediato si:  Tiene la presión arterial mucho más larry de lo normal (rey 180/110 o más larry), carol no tiene síntomas. Piensa que la presión arterial larry le está provocando síntomas, rey:  Dolor de perez intenso. Leonela [place].   Vigile de cerca los [facility], y asegúrese de comunicarse con billings médico si:  Billings presión arterial mide 140/90 o más en al menos 2 ocasiones. [place] significa que el número de [place] es 140 o superior o el número de abajo es 90 o superior, o ambas cosas. Valentina que podría estar teniendo efectos secundarios de los medicamentos para la presión arterial.  Billings presión arterial suele ser normal, carol se eleva por encima de lo normal en al menos 2 ocasiones. ¿Dónde puede encontrar más información en inglés? Gisel Russohamel a [email or web address] e ingrese a billings cuenta de MyChart. Karen Car en el [facility] \"Search Health Information\" para más información (en inglés) sobre \"Presión arterial larry: Instrucciones de cuidado. \"     Si no tiene christianne cuenta, santa jayant en el enlace \"Sign Up Now\". Revisado: [facility], [address]               OWRSJOI del contenido: 12.5  © 8981-8982 Healthwise, Incorporated. Las instrucciones de cuidado fueron adaptadas bajo licencia por [facility] ([facility]). Si usted tiene [place] afección médica o sobre estas instrucciones, siempre pregunte a billings profesional de elsie. Healthwise, Incorporated niega toda garantía o responsabilidad por billings uso de esta información.

## 2020-08-26 NOTE — PROGRESS NOTES
Discharge instructions reviewed with patient per . Follow up appt scheduled and AVS mailed to patient.       [id number] used to room patient    Referral for mammogram    POCT A1C    Pt tolerated Hep B vaccine given IM right deltoid Mother believes dosage for ADD meds needs to be decreased.  Pl adv

## 2020-09-15 ENCOUNTER — HOSPITAL ENCOUNTER (OUTPATIENT)
Dept: GENERAL RADIOLOGY | Age: 45
Discharge: HOME | End: 2020-09-17

## 2020-09-15 PROCEDURE — 77063 BREAST TOMOSYNTHESIS BI: CPT

## 2020-12-08 NOTE — PROGRESS NOTES
PND, palpitations, or claudication  Gastrointestinal: no nausea, vomiting, heartburn, diarrhea, constipation, abdominal pain, hematochezia or melena  Genitourinary: no urinary urgency, frequency, dysuria, nocturia, hesitancy, or incontinence  Musculoskeletal: no arthritis, arthralgia, myalgia, weakness, or morning stiffness  Skin: no abnormal pigmentation, rash, itching, masses, hair or nail changes    Outpatient Medications Marked as Taking for the 12/9/20 encounter (Virtual Visit) with Daly Jha MD   Medication Sig Dispense Refill    metFORMIN (GLUCOPHAGE) 500 MG tablet Take 1 tablet by mouth 2 times daily (with meals) 60 tablet 3    atorvastatin (LIPITOR) 40 MG tablet Take 1 tablet by mouth daily 90 tablet 1    fluticasone (FLONASE) 50 MCG/ACT nasal spray 1 spray by Each Nostril route daily 1 Bottle 0    pantoprazole (PROTONIX) 20 MG tablet Take 1 tablet by mouth every morning (before breakfast) 30 tablet 0    losartan (COZAAR) 25 MG tablet Take 1 tablet by mouth daily 90 tablet 1       I have reviewed all pertinent PMHx, PSHx, FamHx, SocialHx, medications, and allergies and updated history as appropriate.     OBJECTIVE:    Physical examination not done as this was a telephone encounter    ASSESSMENT/PLAN:  Rhinitis  Reports runny nose, dry cough;  also sick  No fever, myalgia, anosmia  COVID ambulatory test ordered  Advised to maintain self quarantine for 10 days since symptom onset  Flonase nasal spray once daily for nasal stuffiness  Symptoms improving with over the counter decongestants    Type II DM  Controlled  HbA1c at goal- repeat today: 6.6%--> 6.5% (8/2020), next check in february  Continue Metformin 500 mg twice daily  Will check MICROALBUMIN / CREATININE URINE RATIO  Will check BASIC METABOLIC PANEL  Diabetic Foot Exam 10/10, normal (8/2020)  Continue with lifestyle modifications      Essential Hypertension  Controlled  Systolic blood pressure at home usually <130 mmHg   Was started on Amlodipine in 1/2020 but she stopped taking after one month due to bilateral leg swelling and facial flushing  Discontinued Lisinopril due to dry cough  Started on Losartan  Advised to check blood pressure regularly at home  Will check BMP    GERD  Trial of Pantoprazole 20 mg daily for 4 weeks     Hyperlipidemia   High intensity statin continued  Last LDL 64 (2019)  Will check lipid panel  Continue Atorvastatin     Overweight (BMI 25.0-29. 9)  Advised on life style modification    Health Maintenance Due   Topic Date Due    Diabetic retinal exam  05/28/1983    Diabetic microalbuminuria test  01/25/2019    Potassium monitoring  05/27/2020    Creatinine monitoring  05/27/2020    Lipid screen  07/19/2020    Flu vaccine (1) 09/01/2020    Hepatitis B vaccine (2 of 3 - Risk 3-dose series) 09/23/2020       Diabetic foot exam (8/26/2020)  Diabetic eye exam: 2 years ago; advised to follow up    I have reviewed my findings and recommendations with Uriah Miner and Dr Ever Krause MD PGY-2   12/9/2020 10:36 AM

## 2020-12-09 ENCOUNTER — VIRTUAL VISIT (OUTPATIENT)
Dept: INTERNAL MEDICINE | Age: 45
End: 2020-12-09

## 2020-12-09 PROCEDURE — 99443 PR PHYS/QHP TELEPHONE EVALUATION 21-30 MIN: CPT | Performed by: INTERNAL MEDICINE

## 2020-12-09 RX ORDER — FLUTICASONE PROPIONATE 50 MCG
1 SPRAY, SUSPENSION (ML) NASAL DAILY
Qty: 1 BOTTLE | Refills: 0 | Status: SHIPPED
Start: 2020-12-09 | End: 2021-06-03 | Stop reason: SDUPTHER

## 2020-12-09 RX ORDER — ATORVASTATIN CALCIUM 40 MG/1
40 TABLET, FILM COATED ORAL DAILY
Qty: 90 TABLET | Refills: 1 | Status: SHIPPED
Start: 2020-12-09 | End: 2021-06-03 | Stop reason: SDUPTHER

## 2020-12-09 RX ORDER — PANTOPRAZOLE SODIUM 20 MG/1
20 TABLET, DELAYED RELEASE ORAL
Qty: 30 TABLET | Refills: 0 | Status: SHIPPED
Start: 2020-12-09 | End: 2021-06-03 | Stop reason: SDUPTHER

## 2020-12-09 RX ORDER — LOSARTAN POTASSIUM 25 MG/1
25 TABLET ORAL DAILY
Qty: 90 TABLET | Refills: 1 | Status: SHIPPED
Start: 2020-12-09 | End: 2021-06-03 | Stop reason: SDUPTHER

## 2020-12-09 NOTE — PROGRESS NOTES
[facility] [address] [facility]  Internal Medicine Clinic    Attending Physician's Statement      TeleMedicine Patient Consent    This visit was performed as phone visit. Patient identification was verified at the start of the visit, including the patient's telephone number and physical location. I discussed with the patient the nature of our telehealth visits, that:     1. I would evaluate the patient and recommend diagnostics and treatments based on my assessment. 2. If it is felt that the patient should be evaluated in the clinic or an emergency room setting, then they would be directed there. 3. Our sessions are not being recorded and that personal health information is protected. 4. Our team would provide follow up care in person if/when the patient needs it. Patient does agree to proceed with telemedicine consultation. Patient's location[place]    I have discussed the case, including pertinent history with the medical resident. I agree with the assessment, plan and orders as documented by the resident. I have reviewed all the pertinent PMHx, PSHx, Family Hx, Social Hx, medications and allergies, and updated history as appropriate. Patient presents for telephone visit assisted by [employer] . 1. HTN, discontinued lisinopril because of cough which improved after discontinuation. Unable to tolerate amlodipine. Will trial losartan. 2. Viral infection - covid testing  3. Possible reflux - trial PPI  4. HLD, tolerating statin  5. DM, not insulin-requiring, controlled. BMP, lipid panel, microalb/cr    Advised flu shot  Need to do pending labs    Pertinent lab results and imaging studies have been reviewed. Medical problems, assessment and plan per medical resident's note. Time spent: 21 - 30 mins      Renny Daigle.  Kenyatta Woods MD  Internal Medicine Residency Faculty  12/9/2020      The patient is being evaluated by a telephone encounter to address concerns as

## 2020-12-09 NOTE — PATIENT INSTRUCTIONS
Discharge Instructions:   Be compliant with medications   Please have COVID testing done   COVID drive through testing center:    [facility]; Hours: 6.00 AM-2.30 PM   Please maintain self quarantine for 10 days since symptom onset   Start taking Pantoprazole 20 mg daily for 4 weeks   Please have labs done before coming to next visit   Please check blood pressure regularly at home and maintain a blood pressure log   Please check blood sugar regularly at home and maintain a blood sugar log   Follow up in 3 months, or sooner if symptoms get worse or do not resolve    Patient Education        COVID-19 Viral Test: About This Test  What is it? A COVID-19 viral test is a way to find out if you have COVID-19. The test looks for the genetic material of the virus in cells from your breathing passages or lungs (respiratory system). This may also be called a nucleic acid or antigen test.  Why is it done? This test is used to diagnose a current infection with SARS-CoV-2, the virus that causes COVID-19. Knowing that you have the virus means that you can take steps to protect others from getting infected. This can help limit the spread of the virus. Knowing who has COVID-19 is also important for experts who track the virus. It can help them learn more about how the virus spreads. How do you prepare for the test?  You don't need to do anything to prepare for this test. But be sure to follow any instructions your health care provider gives you. How is it done? The test is most often done on a sample from your nose, throat, or lungs. It's sometimes done on a sample of saliva. One way a sample is collected is by putting a long swab into the back of your nose. What do your results mean? The result is either positive or negative. A positive result means that the genetic material of the virus was found in your sample. You have COVID-19 now.   A negative result means that the genetic material was not found. This may mean that you don't have COVID-19. But it's possible to get a \"false-negative\" result. This means that the test shows that you don't have COVID-19 when in fact you do. This may happen because you were tested too soon after you were infected, before the virus started to spread in your nose and throat. Or it could happen because the swab missed the infection. Current as of: July 10, 2020               Content Version: 12.6  © 2006-2020 [employer]. Care instructions adapted under license by [facility] ([facility]). If you have questions about a medical condition or this instruction, always ask your healthcare professional. [address] any warranty or liability for your use of this information.

## 2021-01-21 NOTE — PROGRESS NOTES
Called patient with assistance from [address] , [place], #[phone]. Patient reminded of the need to have blood work prior to next appointment and to fast for 12 hours. Voiced understanding. Lab orders mailed to patient.

## 2021-06-03 DIAGNOSIS — K21.9 GASTROESOPHAGEAL REFLUX DISEASE WITHOUT ESOPHAGITIS: ICD-10-CM

## 2021-06-03 DIAGNOSIS — I10 ESSENTIAL HYPERTENSION: ICD-10-CM

## 2021-06-03 DIAGNOSIS — E11.9 TYPE 2 DIABETES MELLITUS WITHOUT COMPLICATION, WITHOUT LONG-TERM CURRENT USE OF INSULIN (HCC): ICD-10-CM

## 2021-06-03 RX ORDER — LOSARTAN POTASSIUM 25 MG/1
25 TABLET ORAL DAILY
Qty: 90 TABLET | Refills: 1 | Status: SHIPPED
Start: 2021-06-03 | End: 2021-07-28 | Stop reason: SDUPTHER

## 2021-06-03 RX ORDER — ATORVASTATIN CALCIUM 40 MG/1
40 TABLET, FILM COATED ORAL DAILY
Qty: 90 TABLET | Refills: 1 | Status: SHIPPED
Start: 2021-06-03 | End: 2021-12-06

## 2021-06-03 RX ORDER — PANTOPRAZOLE SODIUM 20 MG/1
20 TABLET, DELAYED RELEASE ORAL
Qty: 90 TABLET | Refills: 1 | Status: SHIPPED
Start: 2021-06-03 | End: 2021-06-18 | Stop reason: SDUPTHER

## 2021-06-03 RX ORDER — FLUTICASONE PROPIONATE 50 MCG
1 SPRAY, SUSPENSION (ML) NASAL DAILY
Qty: 1 BOTTLE | Refills: 0 | Status: SHIPPED
Start: 2021-06-03 | End: 2021-07-28 | Stop reason: ALTCHOICE

## 2021-06-03 NOTE — TELEPHONE ENCOUNTER
Please reschedule her for appointment in 4- 6 weeks with PCP.     Electronically signed by Holli Edwards DO on 6/3/2021 at 4:27 PM

## 2021-06-18 ENCOUNTER — OFFICE VISIT (OUTPATIENT)
Dept: INTERNAL MEDICINE | Age: 46
End: 2021-06-18

## 2021-06-18 VITALS
DIASTOLIC BLOOD PRESSURE: 80 MMHG | WEIGHT: 145 LBS | RESPIRATION RATE: 16 BRPM | HEIGHT: 61 IN | SYSTOLIC BLOOD PRESSURE: 136 MMHG | HEART RATE: 80 BPM | BODY MASS INDEX: 27.38 KG/M2 | TEMPERATURE: 98 F

## 2021-06-18 DIAGNOSIS — E11.9 TYPE 2 DIABETES MELLITUS WITHOUT COMPLICATION, WITHOUT LONG-TERM CURRENT USE OF INSULIN (HCC): ICD-10-CM

## 2021-06-18 DIAGNOSIS — K21.9 GASTROESOPHAGEAL REFLUX DISEASE WITHOUT ESOPHAGITIS: ICD-10-CM

## 2021-06-18 LAB — HBA1C MFR BLD: 7.3 %

## 2021-06-18 PROCEDURE — 3051F HG A1C>EQUAL 7.0%<8.0%: CPT | Performed by: INTERNAL MEDICINE

## 2021-06-18 PROCEDURE — 99214 OFFICE O/P EST MOD 30 MIN: CPT | Performed by: INTERNAL MEDICINE

## 2021-06-18 PROCEDURE — 99212 OFFICE O/P EST SF 10 MIN: CPT | Performed by: INTERNAL MEDICINE

## 2021-06-18 PROCEDURE — 83036 HEMOGLOBIN GLYCOSYLATED A1C: CPT | Performed by: INTERNAL MEDICINE

## 2021-06-18 RX ORDER — PANTOPRAZOLE SODIUM 20 MG/1
20 TABLET, DELAYED RELEASE ORAL
Qty: 90 TABLET | Refills: 1 | Status: SHIPPED | OUTPATIENT
Start: 2021-06-18 | End: 2021-11-09 | Stop reason: SDUPTHER

## 2021-06-18 SDOH — ECONOMIC STABILITY: FOOD INSECURITY: WITHIN THE PAST 12 MONTHS, YOU WORRIED THAT YOUR FOOD WOULD RUN OUT BEFORE YOU GOT MONEY TO BUY MORE.: SOMETIMES TRUE

## 2021-06-18 SDOH — ECONOMIC STABILITY: FOOD INSECURITY: WITHIN THE PAST 12 MONTHS, THE FOOD YOU BOUGHT JUST DIDN'T LAST AND YOU DIDN'T HAVE MONEY TO GET MORE.: SOMETIMES TRUE

## 2021-06-18 ASSESSMENT — PATIENT HEALTH QUESTIONNAIRE - PHQ9
2. FEELING DOWN, DEPRESSED OR HOPELESS: 1
SUM OF ALL RESPONSES TO PHQ QUESTIONS 1-9: 1
SUM OF ALL RESPONSES TO PHQ9 QUESTIONS 1 & 2: 1
SUM OF ALL RESPONSES TO PHQ QUESTIONS 1-9: 1
1. LITTLE INTEREST OR PLEASURE IN DOING THINGS: 0
SUM OF ALL RESPONSES TO PHQ QUESTIONS 1-9: 1

## 2021-06-18 ASSESSMENT — SOCIAL DETERMINANTS OF HEALTH (SDOH): HOW HARD IS IT FOR YOU TO PAY FOR THE VERY BASICS LIKE FOOD, HOUSING, MEDICAL CARE, AND HEATING?: SOMEWHAT HARD

## 2021-06-18 NOTE — PROGRESS NOTES
[facility]  Internal Medicine Residency Program  ACC Note      SUBJECTIVE:  CC: had concerns including Diabetes, Hypertension, and Hyperlipidemia. Maryjane Sneed presented to the [facility] for a routine visit   #[id number]     HTN, unable to tolerate amlodipine, lisinopril or losartan   She stopped taking losartan 3 months ago, not feeling well, c/o sense of heart palpation, strong pumping sensation on chest    She reports BP around 120-140s/70-90s    NIDDM2, not insulin-requiring, controlled. BMP, lipid panel, microalb/cr  A1c 7.3 today <- 6.5 8/2020 refuse to increase the dose of metformin, patient stated that she will work on diet and excise, she doesn't check BG at home, hx of NIDDM2 well controlled on metformin 500 mg BID    Check FBS -> virtual visit     GERD - trial PPI run out for months  Stated PPI helped with heartburn significantly, she runs out of it for couple of months     HLD, tolerating statin well, continue high intensity statin      Review Of Systems:  General: no fevers, chills, weight loss or gain.    Ears/Nose/Throat: no hearing loss, tinnitus, vertigo, nosebleed, nasal congestion, rhinorrhea, sore throat  Respiratory: no cough, pleuritic chest pain, dyspnea, or wheezing  Cardiovascular: no chest pain, angina, dyspnea on exertion, orthopnea, PND, palpitations, or claudication  Gastrointestinal: no nausea, vomiting, heartburn, diarrhea, constipation, abdominal pain, hematochezia or melena  Genitourinary: no urinary urgency, frequency, dysuria, nocturia, hesitancy, or incontinence  Musculoskeletal: no arthritis, arthralgia, myalgia, weakness, or morning stiffness  Skin: no abnormal pigmentation, rash, itching, masses, hair or nail changes    Outpatient Medications Marked as Taking for the 6/18/21 encounter (Office Visit) with Edie Liz MD   Medication Sig Dispense Refill    pantoprazole (PROTONIX) 20 MG tablet Take 1 tablet by mouth every morning (before have reviewed my findings and recommendations with Viviane Keyes and Dr Nicole Arevalo MD PGY-3  6/18/2021 1:11 PM

## 2021-06-18 NOTE — PROGRESS NOTES
[facility]  Internal Medicine Residency Clinic    Attending Physician Statement  I have discussed the case, including pertinent history and exam findings with the resident physician. I agree with the assessment, plan and orders as documented by the resident. I have reviewed all pertinent PMHx, PSHx, FamHx, SocialHx, medications, and allergies and updated history as appropriate. Patient here for routine follow up of medical problems. 1. DM, non-insulin requiring. A1c is increased today. Discussed going up on metformin but patient would like to try lifestyle modifications first. Monitor BS, if not improved will increase metformin on next visit. 2. HTN, unfortunately also did not tolerate losartan. BP at 136/80 today. She is hesistant to restart any medication at this point because of intolerance of previous medications. Lifestyle modifications discussed, continue to monitor BP. 3. GERD, improved with PPI    Needs pending labs done. VV next month with PCP to assess BS and BP. Remainder of medical problems as per resident note. Hazle Dries S. Corrina Heimlich, MD  6/18/2021 2:05 PM

## 2021-06-18 NOTE — PROGRESS NOTES
Pt screened positive for SDOH related to financial strain, transportation and or food insecurity and declined further contact for assessment/resources.     poct hemogobin A1c 7.3    Discharged per Dr.Yin BLACKWELL given  Reprinted prescriptions for labwork given  Referral for podiatry and ophthalmology

## 2021-06-18 NOTE — PATIENT INSTRUCTIONS
serving is 1 slice of bread, 1 ounce of dry cereal, or ½ cup of cooked rice, pasta, or cooked cereal. Try to choose whole-grain products as much as possible. · Limit lean meat, poultry, and fish to 2 servings each day. A serving is 3 ounces, about the size of a deck of cards. · Eat 4 to 5 servings of nuts, seeds, and legumes (cooked dried beans, lentils, and split peas) each week. A serving is 1/3 cup of nuts, 2 tablespoons of seeds, or ½ cup of cooked beans or peas. · Limit fats and oils to 2 to 3 servings each day. A serving is 1 teaspoon of vegetable oil or 2 tablespoons of salad dressing. · Limit sweets and added sugars to 5 servings or less a week. A serving is 1 tablespoon jelly or jam, ½ cup sorbet, or 1 cup of lemonade. · Eat less than 2,300 milligrams (mg) of sodium a day. If you limit your sodium to 1,500 mg a day, you can lower your blood pressure even more. · Be aware that all of these are the suggested number of servings for people who eat 1,800 to 2,000 calories a day. Your recommended number of servings may be different if you need more or fewer calories. Tips for success  · Start small. Do not try to make dramatic changes to your diet all at once. You might feel that you are missing out on your favorite foods and then be more likely to not follow the plan. Make small changes, and stick with them. Once those changes become habit, add a few more changes. · Try some of the following:  ? Make it a goal to eat a fruit or vegetable at every meal and at snacks. This will make it easy to get the recommended amount of fruits and vegetables each day. ? Try yogurt topped with fruit and nuts for a snack or healthy dessert. ? Add lettuce, tomato, cucumber, and onion to sandwiches. ? Combine a ready-made pizza crust with low-fat mozzarella cheese and lots of vegetable toppings. Try using tomatoes, squash, spinach, broccoli, carrots, cauliflower, and onions. ?  Have a variety of cut-up vegetables with a low-fat dip as an appetizer instead of chips and dip. ? Sprinkle sunflower seeds or chopped almonds over salads. Or try adding chopped walnuts or almonds to cooked vegetables. ? Try some vegetarian meals using beans and peas. Add garbanzo or kidney beans to salads. Make burritos and tacos with mashed wheeler beans or black beans. Where can you learn more? Go to [email or web address] org and sign in to your [employer] account. Enter Y099 in the [facility] box to learn more about \"DASH Diet: Care Instructions. \"     If you do not have an account, please click on the \"Sign Up Now\" link. Current as of: August 31, 2020               Content Version: 12.9  © 2993-3974 Healthwise, Incorporated. Care instructions adapted under license by [facility] ([facility]). If you have questions about a medical condition or this instruction, always ask your healthcare professional. [address]  any warranty or liability for your use of this information.        Please check your blood pressure 2 times a day, and BG once in the AM  Please keep working on diet and exercise for HTN and DM

## 2021-06-26 ENCOUNTER — APPOINTMENT (OUTPATIENT)
Dept: CT IMAGING | Age: 46
End: 2021-06-26

## 2021-06-26 ENCOUNTER — HOSPITAL ENCOUNTER (EMERGENCY)
Age: 46
Discharge: HOME | End: 2021-06-26
Attending: EMERGENCY MEDICINE

## 2021-06-26 VITALS
SYSTOLIC BLOOD PRESSURE: 152 MMHG | BODY MASS INDEX: 27.38 KG/M2 | RESPIRATION RATE: 16 BRPM | TEMPERATURE: 98.7 F | HEIGHT: 61 IN | HEART RATE: 95 BPM | WEIGHT: 145 LBS | OXYGEN SATURATION: 98 % | DIASTOLIC BLOOD PRESSURE: 72 MMHG

## 2021-06-26 DIAGNOSIS — I10 ESSENTIAL HYPERTENSION: ICD-10-CM

## 2021-06-26 DIAGNOSIS — R51.9 NONINTRACTABLE HEADACHE, UNSPECIFIED CHRONICITY PATTERN, UNSPECIFIED HEADACHE TYPE: Primary | ICD-10-CM

## 2021-06-26 LAB
ANION GAP SERPL CALCULATED.3IONS-SCNC: 13 MMOL/L (ref 7–16)
BACTERIA: NORMAL /HPF
BASOPHILS ABSOLUTE: 0.04 E9/L (ref 0–0.2)
BASOPHILS RELATIVE PERCENT: 0.6 % (ref 0–2)
BILIRUBIN URINE: NEGATIVE
BLOOD, URINE: ABNORMAL
BUN BLDV-MCNC: 10 MG/DL (ref 6–20)
CALCIUM SERPL-MCNC: 10 MG/DL (ref 8.6–10.2)
CHLORIDE BLD-SCNC: 102 MMOL/L (ref 98–107)
CLARITY: CLEAR
CO2: 25 MMOL/L (ref 22–29)
COLOR: YELLOW
CREAT SERPL-MCNC: 0.6 MG/DL (ref 0.5–1)
EOSINOPHILS ABSOLUTE: 0.21 E9/L (ref 0.05–0.5)
EOSINOPHILS RELATIVE PERCENT: 3.2 % (ref 0–6)
GFR AFRICAN AMERICAN: >60
GFR NON-AFRICAN AMERICAN: >60 ML/MIN/1.73
GLUCOSE BLD-MCNC: 137 MG/DL (ref 74–99)
GLUCOSE URINE: NEGATIVE MG/DL
HCG, URINE, POC: NEGATIVE
HCT VFR BLD CALC: 35.2 % (ref 34–48)
HEMOGLOBIN: 11.6 G/DL (ref 11.5–15.5)
IMMATURE GRANULOCYTES #: 0.01 E9/L
IMMATURE GRANULOCYTES %: 0.2 % (ref 0–5)
KETONES, URINE: NEGATIVE MG/DL
LEUKOCYTE ESTERASE, URINE: NEGATIVE
LYMPHOCYTES ABSOLUTE: 2.88 E9/L (ref 1.5–4)
LYMPHOCYTES RELATIVE PERCENT: 43.4 % (ref 20–42)
Lab: NORMAL
MCH RBC QN AUTO: 28.4 PG (ref 26–35)
MCHC RBC AUTO-ENTMCNC: 33 % (ref 32–34.5)
MCV RBC AUTO: 86.1 FL (ref 80–99.9)
METER GLUCOSE: 167 MG/DL (ref 74–99)
MONOCYTES ABSOLUTE: 0.41 E9/L (ref 0.1–0.95)
MONOCYTES RELATIVE PERCENT: 6.2 % (ref 2–12)
NEGATIVE QC PASS/FAIL: NORMAL
NEUTROPHILS ABSOLUTE: 3.08 E9/L (ref 1.8–7.3)
NEUTROPHILS RELATIVE PERCENT: 46.4 % (ref 43–80)
NITRITE, URINE: NEGATIVE
PDW BLD-RTO: 14.3 FL (ref 11.5–15)
PH UA: 5.5 (ref 5–9)
PLATELET # BLD: 338 E9/L (ref 130–450)
PMV BLD AUTO: 10.3 FL (ref 7–12)
POSITIVE QC PASS/FAIL: NORMAL
POTASSIUM SERPL-SCNC: 4.1 MMOL/L (ref 3.5–5)
PROTEIN UA: NEGATIVE MG/DL
RBC # BLD: 4.09 E12/L (ref 3.5–5.5)
RBC UA: NORMAL /HPF (ref 0–2)
SARS-COV-2, NAAT: NOT DETECTED
SODIUM BLD-SCNC: 140 MMOL/L (ref 132–146)
SPECIFIC GRAVITY UA: <=1.005 (ref 1–1.03)
TROPONIN, HIGH SENSITIVITY: <6 NG/L (ref 0–9)
UROBILINOGEN, URINE: 0.2 E.U./DL
WBC # BLD: 6.6 E9/L (ref 4.5–11.5)
WBC UA: NORMAL /HPF (ref 0–5)

## 2021-06-26 PROCEDURE — 96374 THER/PROPH/DIAG INJ IV PUSH: CPT

## 2021-06-26 PROCEDURE — 2580000003 HC RX 258: Performed by: EMERGENCY MEDICINE

## 2021-06-26 PROCEDURE — 85025 COMPLETE CBC W/AUTO DIFF WBC: CPT

## 2021-06-26 PROCEDURE — 99284 EMERGENCY DEPT VISIT MOD MDM: CPT

## 2021-06-26 PROCEDURE — 87635 SARS-COV-2 COVID-19 AMP PRB: CPT

## 2021-06-26 PROCEDURE — 81001 URINALYSIS AUTO W/SCOPE: CPT

## 2021-06-26 PROCEDURE — 84484 ASSAY OF TROPONIN QUANT: CPT

## 2021-06-26 PROCEDURE — 6360000002 HC RX W HCPCS: Performed by: EMERGENCY MEDICINE

## 2021-06-26 PROCEDURE — 96375 TX/PRO/DX INJ NEW DRUG ADDON: CPT

## 2021-06-26 PROCEDURE — 80048 BASIC METABOLIC PNL TOTAL CA: CPT

## 2021-06-26 PROCEDURE — 82962 GLUCOSE BLOOD TEST: CPT

## 2021-06-26 PROCEDURE — 70450 CT HEAD/BRAIN W/O DYE: CPT

## 2021-06-26 RX ORDER — 0.9 % SODIUM CHLORIDE 0.9 %
1000 INTRAVENOUS SOLUTION INTRAVENOUS ONCE
Status: COMPLETED | OUTPATIENT
Start: 2021-06-26 | End: 2021-06-26

## 2021-06-26 RX ORDER — METOCLOPRAMIDE HYDROCHLORIDE 5 MG/ML
10 INJECTION INTRAMUSCULAR; INTRAVENOUS ONCE
Status: COMPLETED | OUTPATIENT
Start: 2021-06-26 | End: 2021-06-26

## 2021-06-26 RX ORDER — DIPHENHYDRAMINE HYDROCHLORIDE 50 MG/ML
25 INJECTION INTRAMUSCULAR; INTRAVENOUS ONCE
Status: COMPLETED | OUTPATIENT
Start: 2021-06-26 | End: 2021-06-26

## 2021-06-26 RX ORDER — SODIUM CHLORIDE 0.9 % (FLUSH) 0.9 %
10 SYRINGE (ML) INJECTION PRN
Status: DISCONTINUED | OUTPATIENT
Start: 2021-06-26 | End: 2021-06-26 | Stop reason: HOSPADM

## 2021-06-26 RX ADMIN — SODIUM CHLORIDE 1000 ML: 9 INJECTION, SOLUTION INTRAVENOUS at 13:27

## 2021-06-26 RX ADMIN — METOCLOPRAMIDE 10 MG: 5 INJECTION, SOLUTION INTRAMUSCULAR; INTRAVENOUS at 13:18

## 2021-06-26 RX ADMIN — DIPHENHYDRAMINE HYDROCHLORIDE 25 MG: 50 INJECTION INTRAMUSCULAR; INTRAVENOUS at 13:18

## 2021-06-26 ASSESSMENT — PAIN DESCRIPTION - PAIN TYPE
TYPE: ACUTE PAIN
TYPE: ACUTE PAIN

## 2021-06-26 ASSESSMENT — ENCOUNTER SYMPTOMS
SWOLLEN GLANDS: 0
ABDOMINAL DISTENTION: 0
WHEEZING: 0
DIARRHEA: 0
EYE PAIN: 0
COUGH: 1
EYE REDNESS: 0
SINUS PRESSURE: 0
EYE DISCHARGE: 0
NAUSEA: 1
BACK PAIN: 0
BLURRED VISION: 0
VISUAL CHANGE: 0
SHORTNESS OF BREATH: 0
SORE THROAT: 0
VOMITING: 0
ABDOMINAL PAIN: 0

## 2021-06-26 ASSESSMENT — PAIN SCALES - GENERAL
PAINLEVEL_OUTOF10: 10
PAINLEVEL_OUTOF10: 10

## 2021-06-26 ASSESSMENT — PAIN DESCRIPTION - ORIENTATION: ORIENTATION: RIGHT;LEFT

## 2021-06-26 ASSESSMENT — PAIN SCALES - WONG BAKER: WONGBAKER_NUMERICALRESPONSE: 2

## 2021-06-26 ASSESSMENT — PAIN DESCRIPTION - PROGRESSION
CLINICAL_PROGRESSION: NOT CHANGED
CLINICAL_PROGRESSION: GRADUALLY WORSENING

## 2021-06-26 ASSESSMENT — PAIN - FUNCTIONAL ASSESSMENT: PAIN_FUNCTIONAL_ASSESSMENT: ACTIVITIES ARE NOT PREVENTED

## 2021-06-26 ASSESSMENT — PAIN DESCRIPTION - FREQUENCY
FREQUENCY: CONTINUOUS
FREQUENCY: INTERMITTENT

## 2021-06-26 ASSESSMENT — PAIN DESCRIPTION - LOCATION
LOCATION: HEAD
LOCATION: HEAD

## 2021-06-26 ASSESSMENT — PAIN DESCRIPTION - ONSET: ONSET: ON-GOING

## 2021-06-26 ASSESSMENT — PAIN DESCRIPTION - DESCRIPTORS
DESCRIPTORS: ACHING
DESCRIPTORS: HEADACHE

## 2021-06-26 NOTE — ED PROVIDER NOTES
64-year-old female presents to the emergency department with high blood sugar headache and hypertension. Patient is [place]-speaking all questions were asked through [facility] [place] . Per the patient she had a headache for approximately a week some mild nasal congestion and cough. She states in training medications for this she is on regular high blood pressure medications which she states she has been taking. She has been is concerned because the headache was not improving. States no vision changes chest pain shortness of breath vomiting diarrhea abdominal pain urinary symptoms or leg swelling. She did state some mild nausea couple days ago. But this is resolved. She has no known contact with COVID-19. The history is provided by the patient. The history is limited by a language barrier. A  was used. Headache  Pain location:  Frontal  Radiates to:  Does not radiate  Severity currently:  2/10  Severity at highest:  7/10  Onset quality:  Gradual  Duration:  1 week  Timing:  Intermittent  Progression:  Waxing and waning  Chronicity:  New  Relieved by:  Nothing  Worsened by:  Nothing  Associated symptoms: congestion, cough and nausea    Associated symptoms: no abdominal pain, no back pain, no blurred vision, no diarrhea, no dizziness, no ear pain, no eye pain, no facial pain, no fatigue, no fever, no myalgias, no neck stiffness, no sinus pressure, no sore throat, no swollen glands, no URI, no visual change, no vomiting and no weakness         Review of Systems   Constitutional: Negative for chills, fatigue and fever. HENT: Positive for congestion. Negative for ear pain, sinus pressure and sore throat. Eyes: Negative for blurred vision, pain, discharge and redness. Respiratory: Positive for cough. Negative for shortness of breath and wheezing. Cardiovascular: Negative for chest pain. Gastrointestinal: Positive for nausea.  Negative for abdominal distention, abdominal pain, diarrhea and vomiting. Genitourinary: Negative for dysuria and frequency. Musculoskeletal: Negative for arthralgias, back pain, myalgias and neck stiffness. Skin: Negative for rash and wound. Neurological: Positive for headaches. Negative for dizziness and weakness. Hematological: Negative for adenopathy. All other systems reviewed and are negative. Physical Exam  Constitutional:       Appearance: Normal appearance. HENT:      Head: Normocephalic and atraumatic. Nose: Nose normal.   Eyes:      Extraocular Movements: Extraocular movements intact. Pupils: Pupils are equal, round, and reactive to light. Cardiovascular:      Rate and Rhythm: Normal rate and regular rhythm. Pulses: Normal pulses. Heart sounds: Normal heart sounds. Pulmonary:      Effort: Pulmonary effort is normal.      Breath sounds: Normal breath sounds. Abdominal:      General: Abdomen is flat. Bowel sounds are normal. There is no distension. Palpations: Abdomen is soft. Tenderness: There is no abdominal tenderness. There is no guarding. Musculoskeletal:         General: Normal range of motion. Cervical back: Normal range of motion and neck supple. No rigidity. Skin:     General: Skin is warm. Capillary Refill: Capillary refill takes less than 2 seconds. Neurological:      General: No focal deficit present. Mental Status: She is alert and oriented to person, place, and time. Procedures     MDM  Number of Diagnoses or Management Options  Essential hypertension  Nonintractable headache, unspecified chronicity pattern, unspecified headache type  Diagnosis management comments: Patient seen and examined. Labs and imaging were ordered. Work-up found to be reassuring. Patient blood pressure improved with headache cocktail CT head reviewed found to be negative blood sugar in the 150s.   Recommended the patient follow-up with her primary care physician for blood pressure management given improvement of symptoms. Patient discharged outpatient follow-up.             --------------------------------------------- PAST HISTORY ---------------------------------------------  Past Medical History:  has a past medical history of Breast pain, right, Diabetes ([address] Essential hypertension, and Hyperlipidemia. Past Surgical History:  has no past surgical history on file. Social History:  reports that she has never smoked. She has never used smokeless tobacco. She reports that she does not drink alcohol and does not use drugs. Family History: family history includes Diabetes in her father and mother; High Blood Pressure in her mother. The patients home medications have been reviewed. Allergies: Patient has no known allergies.     -------------------------------------------------- RESULTS -------------------------------------------------  Labs:  Results for orders placed or performed during the hospital encounter of 06/26/21   COVID-19, Rapid    Specimen: Nasopharyngeal Swab   Result Value Ref Range    SARS-CoV-2, NAAT Not Detected Not Detected   CBC Auto Differential   Result Value Ref Range    WBC 6.6 4.5 - 11.5 E9/L    RBC 4.09 3.50 - 5.50 E12/L    Hemoglobin 11.6 11.5 - 15.5 g/dL    Hematocrit 35.2 34.0 - 48.0 %    MCV 86.1 80.0 - 99.9 fL    MCH 28.4 26.0 - 35.0 pg    MCHC 33.0 32.0 - 34.5 %    RDW 14.3 11.5 - 15.0 fL    Platelets 024 209 - 117 E9/L    MPV 10.3 7.0 - 12.0 fL    Neutrophils % 46.4 43.0 - 80.0 %    Immature Granulocytes % 0.2 0.0 - 5.0 %    Lymphocytes % 43.4 (H) 20.0 - 42.0 %    Monocytes % 6.2 2.0 - 12.0 %    Eosinophils % 3.2 0.0 - 6.0 %    Basophils % 0.6 0.0 - 2.0 %    Neutrophils Absolute 3.08 1.80 - 7.30 E9/L    Immature Granulocytes # 0.01 E9/L    Lymphocytes Absolute 2.88 1.50 - 4.00 E9/L    Monocytes Absolute 0.41 0.10 - 0.95 E9/L    Eosinophils Absolute 0.21 0.05 - 0.50 E9/L    Basophils Absolute 0.04 0.00 - 0.20 C8/Q   Basic Metabolic Panel   Result Value Ref Range    Sodium 140 132 - 146 mmol/L    Potassium 4.1 3.5 - 5.0 mmol/L    Chloride 102 98 - 107 mmol/L    CO2 25 22 - 29 mmol/L    Anion Gap 13 7 - 16 mmol/L    Glucose 137 (H) 74 - 99 mg/dL    BUN 10 6 - 20 mg/dL    CREATININE 0.6 0.5 - 1.0 mg/dL    GFR Non-African American >60 >=60 mL/min/1.73    GFR African American >60     Calcium 10.0 8.6 - 10.2 mg/dL   Troponin   Result Value Ref Range    Troponin, High Sensitivity <6 0 - 9 ng/L   URINALYSIS   Result Value Ref Range    Color, UA Yellow Straw/Yellow    Clarity, UA Clear Clear    Glucose, Ur Negative Negative mg/dL    Bilirubin Urine Negative Negative    Ketones, Urine Negative Negative mg/dL    Specific Gravity, UA <=1.005 1.005 - 1.030    Blood, Urine SMALL (A) Negative    pH, UA 5.5 5.0 - 9.0    Protein, UA Negative Negative mg/dL    Urobilinogen, Urine 0.2 <2.0 E.U./dL    Nitrite, Urine Negative Negative    Leukocyte Esterase, Urine Negative Negative   Microscopic Urinalysis   Result Value Ref Range    WBC, UA NONE 0 - 5 /HPF    RBC, UA NONE 0 - 2 /HPF    Bacteria, UA NONE SEEN None Seen /HPF   POCT Glucose   Result Value Ref Range    Meter Glucose 167 (H) 74 - 99 mg/dL   POC Pregnancy Urine Qual   Result Value Ref Range    HCG, Urine, POC Negative Negative    Lot Number [id number]     Positive QC Pass/Fail Pass     Negative QC Pass/Fail Pass        Radiology:  CT HEAD WO CONTRAST   Preliminary Result   No acute intracranial abnormality. Chronic bilateral ethmoid, maxillary, and sphenoid sinusitis.             ------------------------- NURSING NOTES AND VITALS REVIEWED ---------------------------  Date / Time Roomed:  6/26/2021 12:33 PM  ED Bed Assignment:  18/18    The nursing notes within the ED encounter and vital signs as below have been reviewed.    BP (!) 152/72   Pulse 95   Temp 98.7 °F (37.1 °C) (Temporal)   Resp 16   Ht 5' 1\" (1.549 m)   Wt 145 lb (65.8 kg)   SpO2 98%   BMI 27.40 kg/m²   Oxygen Saturation Interpretation: Normal      ------------------------------------------ PROGRESS NOTES ------------------------------------------  I have spoken with the patient and discussed todays results, in addition to providing specific details for the plan of care and counseling regarding the diagnosis and prognosis. Their questions are answered at this time and they are agreeable with the plan. I discussed at length with them reasons for immediate return here for re evaluation. They will followup with their primary care physician by calling their office tomorrow. --------------------------------- ADDITIONAL PROVIDER NOTES ---------------------------------  At this time the patient is without objective evidence of an acute process requiring hospitalization or inpatient management. They have remained hemodynamically stable throughout their entire ED visit and are stable for discharge with outpatient follow-up. The plan has been discussed in detail and they are aware of the specific conditions for emergent return, as well as the importance of follow-up. Discharge Medication List as of 6/26/2021  3:29 PM          Diagnosis:  1. Nonintractable headache, unspecified chronicity pattern, unspecified headache type    2. Essential hypertension        Disposition:  Patient's disposition: Discharge to home  Patient's condition is stable.        Rodolfo Cooper DO  06/26/21 1554

## 2021-06-28 ENCOUNTER — TELEPHONE (OUTPATIENT)
Dept: INTERNAL MEDICINE | Age: 46
End: 2021-06-28

## 2021-06-28 NOTE — TELEPHONE ENCOUNTER
[facility] [place] ([facility]) ED Follow up Call    Reason for ED visit:  Hyperglycemia, hea    Left message via  # [id number]. FU appts/Provider:    Future Appointments   Date Time Provider Dyllan Kaitlin   7/28/2021  1:00 PM Wade Weiss MD [facility] [address] IF NOT USED  Hi, this message is for  Marizzyjohny Benites   This is  # [phone]  from [employer]  office. Just calling to see how you are doing after your recent visit to the Emergency Room. Didi[facility]Dache wants to make sure you were able to fill any prescriptions and that you understand your discharge instructions. Please return our call if you need to make a follow up appointment with your provider or have any further needs. Our phone number is [phone]*. Have a great day.

## 2021-07-23 ENCOUNTER — HOSPITAL ENCOUNTER (OUTPATIENT)
Age: 46
Discharge: HOME | End: 2021-07-23

## 2021-07-23 DIAGNOSIS — E11.9 TYPE 2 DIABETES MELLITUS WITHOUT COMPLICATION, WITHOUT LONG-TERM CURRENT USE OF INSULIN (HCC): ICD-10-CM

## 2021-07-23 LAB
ANION GAP SERPL CALCULATED.3IONS-SCNC: 11 MMOL/L (ref 7–16)
BUN BLDV-MCNC: 13 MG/DL (ref 6–20)
CALCIUM SERPL-MCNC: 8.8 MG/DL (ref 8.6–10.2)
CHLORIDE BLD-SCNC: 102 MMOL/L (ref 98–107)
CHOLESTEROL, TOTAL: 125 MG/DL (ref 0–199)
CO2: 24 MMOL/L (ref 22–29)
CREAT SERPL-MCNC: 0.6 MG/DL (ref 0.5–1)
CREATININE URINE: 92 MG/DL (ref 29–226)
GFR AFRICAN AMERICAN: >60
GFR NON-AFRICAN AMERICAN: >60 ML/MIN/1.73
GLUCOSE BLD-MCNC: 116 MG/DL (ref 74–99)
HDLC SERPL-MCNC: 48 MG/DL
LDL CHOLESTEROL CALCULATED: 60 MG/DL (ref 0–99)
MICROALBUMIN UR-MCNC: 41.7 MG/L
MICROALBUMIN/CREAT UR-RTO: 45.3 (ref 0–30)
POTASSIUM SERPL-SCNC: 3.8 MMOL/L (ref 3.5–5)
SODIUM BLD-SCNC: 137 MMOL/L (ref 132–146)
TRIGL SERPL-MCNC: 87 MG/DL (ref 0–149)
VLDLC SERPL CALC-MCNC: 17 MG/DL

## 2021-07-23 PROCEDURE — 36415 COLL VENOUS BLD VENIPUNCTURE: CPT

## 2021-07-23 PROCEDURE — 82570 ASSAY OF URINE CREATININE: CPT

## 2021-07-23 PROCEDURE — 80061 LIPID PANEL: CPT

## 2021-07-23 PROCEDURE — 82044 UR ALBUMIN SEMIQUANTITATIVE: CPT

## 2021-07-23 PROCEDURE — 80048 BASIC METABOLIC PNL TOTAL CA: CPT

## 2021-07-27 NOTE — PROGRESS NOTES
Isha Early 89  Internal Medicine Residency Program  [facility] Note      SUBJECTIVE:  CC: had concerns including Diabetes [facility] follow up), Dysmenorrhea (Sharp pain from hip all the way through legs. Pt states this has never happened before. Took Tylenol for the pain and it subsided.), and Other (Smells like something is burning x4 months, comes and goes, denies any Stroke symptoms). Henrietta Ta presented to the [facility] for a routine visit   no: [id number]    She reports 2 weeks ago she developed back pain that radiated to left lower extremity and pain eased after period ended. Pain lasted about 5 days. She denies any fever, lower abdominal pain, nausea, vomiting, headache, dizziness, light headedness, falls, chest pain. Last ED visit on 6/26- for headache and hyperglycemia. CT head unremarkable. Blood sugar level in the ED was 150 mg/dl. Blood sugar at home ranging from 119- 140 mg/dl. No episodes of hypoglycemia. Has follow up appointment with ophthalmology on august 3rd. She had URI symptoms in January, although she tested negative, her  and kids also had symptoms and her  was positive for COVID. Review Of Systems:  General: no fevers, chills, weight loss or gain.    Ears/Nose/Throat: no hearing loss, tinnitus, vertigo, nosebleed, nasal congestion, rhinorrhea, sore throat  Respiratory: no cough, pleuritic chest pain, dyspnea, or wheezing  Cardiovascular: no chest pain, angina, dyspnea on exertion, orthopnea, PND, palpitations, or claudication  Gastrointestinal: no nausea, vomiting, heartburn, diarrhea, constipation, abdominal pain, hematochezia or melena  Genitourinary: no urinary urgency, frequency, dysuria, nocturia, hesitancy, or incontinence  Musculoskeletal: no arthritis, arthralgia, myalgia, weakness, or morning stiffness  Skin: no abnormal pigmentation, rash, itching, masses, hair or nail changes    Outpatient Medications Marked as Taking for the 7/28/21 encounter (Office Visit) with Armando August MD   Medication Sig Dispense Refill    pantoprazole (PROTONIX) 20 MG tablet Take 1 tablet by mouth every morning (before breakfast) 90 tablet 1    atorvastatin (LIPITOR) 40 MG tablet Take 1 tablet by mouth daily 90 tablet 1    fluticasone (FLONASE) 50 MCG/ACT nasal spray 1 spray by Each Nostril route daily 1 Bottle 0    losartan (COZAAR) 25 MG tablet Take 1 tablet by mouth daily 90 tablet 1    metFORMIN (GLUCOPHAGE) 500 MG tablet Take 1 tablet by mouth 2 times daily (with meals) 60 tablet 3    Blood Pressure KIT Check blood pressure daily 1 kit 0    blood glucose test strips (AGAMATRIX AMP TEST) strip Pt to check BS daily. 100 each 3    AGAMATRIX ULTRA-THIN LANCETS MISC Check BS daily 100 each 3    Lancet Device MISC Pt to check BS daily 100 Device 3    Blood Glucose Monitoring Suppl (AGAMATRIX AMP) SREE 1 glucometer 1 Device 0       I have reviewed all pertinent PMHx, PSHx, FamHx, SocialHx, medications, and allergies and updated history as appropriate. OBJECTIVE:    VS: BP (!) 151/82 (Site: Left Upper Arm, Position: Sitting, Cuff Size: Medium Adult)   Pulse 82   Temp 97.3 °F (36.3 °C)   Ht 5' 1\" (1.549 m)   Wt 145 lb (65.8 kg)   SpO2 99%   BMI 27.40 kg/m²   General appearance: Alert, Awake, Oriented times 3, no distress  Lungs: Lungs clear to auscultation bilaterally. No rhonchi, crackles or wheezes  Heart: S1 S2  Regular rate and rhythm. No rub, murmur or gallop  Abdomen: Abdomen soft, non-tender. non-distended BS normal. No masses, organomegaly, no guarding rebound or rigidity.   Extremities: No edema, Peripheral pulses palpable 2/4  Neuro: alert, awake, no gross focal neurologic deficit    ASSESSMENT/PLAN:    Headache  Resolved  Last ED visit due to headache; improved after pain medications  CT head unremarkable    Type II DM  Controlled  HbA1c at goal- repeat today: 6.6%--> 6.5% (8/2020)--> 7.3% (6/21), next check in setember  Continue Metformin 500 mg twice daily  Urine microalbumin/creatinine ratio wnl  BUN/Cr 13/0.6 (7/21)  Last LDL 60 (7/21)  Diabetic Foot Exam 10/10, normal (8/2020)  Continue with lifestyle modifications     Essential Hypertension  Uncontrolled  BP today 151/80 mmHg; repeat BP of 160/100 mmHg  Will increase losartan from 25 to 50 mg daily  Could not tolerate Amlodipine in the past due to bilateral leg swelling and facial flushing  Discontinued Lisinopril due to dry cough  Advised to check blood pressure regularly at home  Will check BMP in next 4 weeks  Follow up for blood pressure in 4 weeks    GERD  Continue Pantoprazole 20 mg  She tried to stop pantoprazole but had symptoms of heartburn and started back    Hyperlipidemia   Last YBB 97 (2170)--> 60 (7/21)  Continue Atorvastatin    Overweight (BMI 25.0-29. 9)  Advised on life style modification and weight loss    Health Care Maintenance    DM screening if over weight or obese 38-68 years old; HbA1C 7.3%, diabetic   Screening mammogram every 2 years- Screening mammogram unremarkable in 2020, repeat mammogram ordered   PAP smear every 3 years- will refer to OB/GYN   Colon cancer screening- ordered   One-time screening for HCV infection to adults born between 1945 and 1965- ordered   Annual lung cancer 54 to [de-identified] years who have a 30 pack-year smoking history and currently smoke or have quit within the past 15 years- not indicated   ETOH misuse - N/A   ASA for primary prevention of CVD and CRC with > 10% 10 year risk 50-59yo:N/A   Diabetic foot exam (8/26/2020)   Diabetic eye exam: 2 years ago; has follow up appointment with ophthalmology       I have reviewed my findings and recommendations with Aakash Rossi and Dr Madelin Silva MD PGY-3  7/28/2021 1:41 PM

## 2021-07-28 ENCOUNTER — OFFICE VISIT (OUTPATIENT)
Dept: INTERNAL MEDICINE | Age: 46
End: 2021-07-28

## 2021-07-28 VITALS
HEIGHT: 61 IN | TEMPERATURE: 97.3 F | WEIGHT: 145 LBS | BODY MASS INDEX: 27.38 KG/M2 | SYSTOLIC BLOOD PRESSURE: 160 MMHG | DIASTOLIC BLOOD PRESSURE: 80 MMHG | HEART RATE: 82 BPM | OXYGEN SATURATION: 99 %

## 2021-07-28 DIAGNOSIS — Z12.31 SCREENING MAMMOGRAM FOR HIGH-RISK PATIENT: Primary | ICD-10-CM

## 2021-07-28 DIAGNOSIS — I10 ESSENTIAL HYPERTENSION: ICD-10-CM

## 2021-07-28 DIAGNOSIS — Z12.11 ENCOUNTER FOR SCREENING COLONOSCOPY: ICD-10-CM

## 2021-07-28 DIAGNOSIS — E11.9 TYPE 2 DIABETES MELLITUS WITHOUT COMPLICATION, WITHOUT LONG-TERM CURRENT USE OF INSULIN (HCC): ICD-10-CM

## 2021-07-28 DIAGNOSIS — Z11.59 NEED FOR HEPATITIS B SCREENING TEST: ICD-10-CM

## 2021-07-28 DIAGNOSIS — Z11.59 NEED FOR HEPATITIS C SCREENING TEST: ICD-10-CM

## 2021-07-28 PROCEDURE — 99212 OFFICE O/P EST SF 10 MIN: CPT | Performed by: INTERNAL MEDICINE

## 2021-07-28 PROCEDURE — 3051F HG A1C>EQUAL 7.0%<8.0%: CPT | Performed by: INTERNAL MEDICINE

## 2021-07-28 PROCEDURE — 99213 OFFICE O/P EST LOW 20 MIN: CPT | Performed by: INTERNAL MEDICINE

## 2021-07-28 RX ORDER — LOSARTAN POTASSIUM 50 MG/1
50 TABLET ORAL DAILY
Qty: 90 TABLET | Refills: 1 | Status: SHIPPED
Start: 2021-07-28 | End: 2021-10-05 | Stop reason: ALTCHOICE

## 2021-07-28 NOTE — PROGRESS NOTES
I[facility] Early 89  Internal Medicine Residency Clinic    Attending Physician Statement  I have discussed the case, including pertinent history and exam findings with the resident physician. I agree with the assessment, plan and orders as documented by the resident. I have reviewed all pertinent PMHx, PSHx, FamHx, SocialHx, medications, and allergies and updated history as appropriate. Patient here for emergency room follow up of medical problems. And follow up on chronic issues     1. HTN: Increase Losartan to 50 mg daily; FU in 5 weeks for BP check and BMP;   2. NIDDM2: BG 140s; no signs of hypoglycemia at this time, lifestyle modifications at this time; if A1c elevated at next visit then increase metformin vs start glp1 agonist, ophthalmology appt ordered, podiatry records request sent   3. HLD: controlled; continue current management   4. Low Back Pain: occurred during menstruation, has completely resolved at this point in time    5. HCM: Hep C and Hep Bs Ab ordered at this time for re-evaluation of immunity, mamogram ordered, discuss colonoscopy with patient;     Remainder of medical problems as per resident note.     Loki Hahn DO  7/28/2021 1:49 PM    Encounter time including independent chart review, discussion with patient, interpreting test results and/or external communications: 30'

## 2021-07-28 NOTE — PROGRESS NOTES
Follow up appointment made for four weeks. I called and scheduled Podiatry appointment for 08/23/21 @ 1:30.  AVS and instructions given to pt by Paulina Small MD.

## 2021-07-28 NOTE — PATIENT INSTRUCTIONS
Instrucciones de descarga:  · Cumplir con los medicamentos. · Empiece a citlalli losartán 50 mg al día  · Realice análisis de laboratorio antes de la próxima visita  · [employer] un seguimiento en, o antes, si los síntomas Lieutenant Brain o no se resuelven  · Llame al [phone] para programar christianne mary alice para recibir la vacuna COVID    Discharge Instructions:   Be compliant with medications   Start taking losartan 50 mg daily   Please have labs done before coming to next visit   Follow up in 4 weeks , or sooner if symptoms get worse or do not resolve   Please call [phone] for scheduling an appointment to get COVID vaccine    Patient Education        Patient Education        Dieta DASH: Instrucciones de cuidado  DASH Diet: Care Instructions  Instrucciones de cuidado     La dieta DASH es un plan de alimentación que puede ayudar a bajar la presión arterial. DASH es la sigla en inglés de \"Dietary Approaches to Stop Hypertension\" (medidas dietéticas para disminuir la hipertensión). Hipertensión significa presión arterial larry. La dieta DASH se concentra en el consumo de alimentos con alto contenido de calcio, potasio y [place]. Estos nutrientes pueden disminuir la presión arterial. Los alimentos con el mayor contenido de [place] nutrientes son las frutas, las verduras, los productos lácteos de bajo contenido de [place], las nueces, las semillas y las legumbres. Bruno citlalli suplementos de calcio, potasio y magnesio, en lugar de comer alimentos ricos en estos nutrientes, no tiene el mismo efecto. La dieta DASH también incluye granos integrales, pescado y aves. La dieta DASH es mark de los cambios de estilo de virginie que quizá le recomiende billings médico para reducir la presión arterial larry. Es posible que billings médico también quiera que usted reduzca la cantidad de sodio en billings Mahsa Zamora. Reducir el sodio mientras sigue la dieta DASH puede bajar la presión arterial incluso más que únicamente con la dieta DASH.   [address] es christianne parte clave de billings tratamiento y seguridad. Asegúrese de hacer y acudir a todas las citas, y llame a billings médico si está teniendo problemas. También es christianne buena idea saber los resultados de pawan exámenes y mantener christianne lista de los medicamentos que padmaja. ¿Cómo puede cuidarse en el hogar? Cómo seguir la dieta DASH  · Coma entre 4 y 5 porciones de fruta al día. Christianne porción incluye 1 fruta de [place], ½ taza de fruta enlatada o cortada en trozos, 1/4 taza de fruta seca o 4 onzas (½ taza) de jugo de frutas. Elija fruta con más frecuencia que jugo. · Consuma entre 4 y 11 porciones de verduras al día. Christianne porción incluye 1 taza de Rosi o de verduras de hojas crudas, ½ taza de verduras cocidas o cortadas en trozos, o 4 onzas (½ taza) de jugo de verduras. Elija comer verduras con más frecuencia que citlalli billings jugo. · Consuma entre 2 y 3 porciones de lácteos semidescremados y descremados al día. Christianne porción incluye 8 onzas de [place], 1 taza de yogur o 1½ onzas de [place]. · Coma entre 6 y 6 porciones de granos todos los [address] Christianne porción incluye 1 rebanada de pan, 1 onza de cereal seco, o ½ taza de arroz, pasta o cereal cocido. Trate de elegir productos de grano integral con la mayor frecuencia posible. · Limite la cantidad de [place]), aves y pescado a 2 porciones al día. Iver Setting porción son 3 onzas, lo que es aproximadamente el tamaño de un eder de naipes. · Coma entre 4 y 5 porciones de nueces, semillas y legumbres (frijoles [habichuelas], lentejas y arvejas [chícharos] secos y cocidos) a la semana. Christianne porción incluye 1/3 taza de nueces, 2 cucharadas de semillas o ½ taza de frijoles o arvejas cocidos. · [address] y aceites a 2 o 3 porciones al día. Christianne porción es 1 cucharadita de aceite vegetal o 2 cucharadas de aderezo para ensaladas. · Limite los dulces y el azúcar adicional a 5 porciones o menos por semana.  Iver Setting porción es 1 cucharada de [place] o [place], ½ taza de sorbete o 1 taza de limonada. · Consuma menos de 2,300 miligramos (mg) de sodio al día. Si limita billings consumo de sodio a 1,500 mg al día, puede reducir billings presión arterial aún más. · Tenga en cuenta que todas estas son las cantidades sugeridas de porciones para las personas que consumen entre 1800 y 2000 calorías al día. La cantidad recomendada de porciones para usted podría ser diferente si necesita más o menos calorías. Consejos para tener éxito  · Inicie con cambios pequeños. No intente hacer cambios radicales en billings dieta de manera repentina. Podría sentir que extraña pawan comidas favoritas y, por lo [place] harry christianne mayor probabilidad de que no cumpla el plan. Michellemati Verde y Valeria. Tanna Chester que esos cambios se conviertan en un hábito, incorpore algunos [employer]. · Pruebe algunas de las siguientes cosas:  ? Fíjese el objetivo de comer christianne porción de fruta o de verduras en todas las comidas y los refrigerios. Tome facilitará alcanzar la cantidad diaria recomendada de frutas y verduras. ? Pruebe comer yogur cubierto con frutas frescas y nueces rey refrigerio o rey postre saludable. ? Agregue pat, tomate, pepino y cebolla a pawan sándwiches. ? Combine christianne masa de pizza precocida con queso mozzarella de bajo contenido de grasa (\"low-fat\") y cúbralo con abundantes verduras. Intente utilizar tomates, [place], espinaca, brócoli, zanahorias, coliflor y cebollas. ? Opte por comer christianne variedad de verduras cortadas en trozos con un aderezo de bajo contenido de grasa rey refrigerio, en lugar de \"chips\" (tipo jenny fritas) y un \"dip\" (producto untable). ? [place]voree semillas de girasol o almendras picadas [employer]. O intente agregar nueces o almendras picadas a las verduras cocidas. ? Pruebe algunas comidas vegetarianas con frijoles y arvejas. Gerardo Rinks o frijoles rojos a las ensaladas. Prepare burritos y tacos con puré de frijoles pintos o negros.   ¿Dónde puede encontrar más información en chandler? Quyen Monzon a [email or web address] e ingrese a billings cuenta de MyChart. Heike Hansencleo B895 en el Monty Kussmaul \"Search Health Information\" para más información (en ingesperanza) sobre \"Dieta DASH: Instrucciones de cuidado. \"     Si no tiene christianne cuenta, santa jayant en el enlace \"Sign Up Now\". Revisado: 31 agosto, 2020               Versión del contenido: 12.9  © 6376-8144 Healthwise, Incorporated. Las instrucciones de cuidado fueron adaptadas bajo licencia por [facility] ([facility]). Si usted tiene [place] afección médica o sobre estas instrucciones, siempre pregunte a billings profesional de elsie. Healthwise, Incorporated niega toda garantía o responsabilidad por billings uso de esta información.

## 2021-08-02 ENCOUNTER — APPOINTMENT (OUTPATIENT)
Dept: ULTRASOUND IMAGING | Age: 46
End: 2021-08-02

## 2021-08-02 ENCOUNTER — APPOINTMENT (OUTPATIENT)
Dept: CT IMAGING | Age: 46
End: 2021-08-02

## 2021-08-02 ENCOUNTER — HOSPITAL ENCOUNTER (EMERGENCY)
Age: 46
Discharge: HOME | End: 2021-08-02
Attending: EMERGENCY MEDICINE

## 2021-08-02 ENCOUNTER — APPOINTMENT (OUTPATIENT)
Dept: GENERAL RADIOLOGY | Age: 46
End: 2021-08-02

## 2021-08-02 VITALS
RESPIRATION RATE: 17 BRPM | TEMPERATURE: 97.6 F | DIASTOLIC BLOOD PRESSURE: 86 MMHG | HEART RATE: 71 BPM | OXYGEN SATURATION: 98 % | SYSTOLIC BLOOD PRESSURE: 157 MMHG | HEIGHT: 61 IN | BODY MASS INDEX: 27.19 KG/M2 | WEIGHT: 144 LBS

## 2021-08-02 DIAGNOSIS — D64.9 ANEMIA, UNSPECIFIED TYPE: ICD-10-CM

## 2021-08-02 DIAGNOSIS — I10 HYPERTENSION, UNSPECIFIED TYPE: ICD-10-CM

## 2021-08-02 DIAGNOSIS — N64.4 BREAST PAIN, LEFT: Primary | ICD-10-CM

## 2021-08-02 DIAGNOSIS — M50.20 PROTRUSION OF CERVICAL INTERVERTEBRAL DISC: ICD-10-CM

## 2021-08-02 DIAGNOSIS — M54.12 CERVICAL RADICULOPATHY: ICD-10-CM

## 2021-08-02 LAB
ALBUMIN SERPL-MCNC: 4.4 G/DL (ref 3.5–5.2)
ALP BLD-CCNC: 79 U/L (ref 35–104)
ALT SERPL-CCNC: 17 U/L (ref 0–32)
ANION GAP SERPL CALCULATED.3IONS-SCNC: 8 MMOL/L (ref 7–16)
AST SERPL-CCNC: 17 U/L (ref 0–31)
BASOPHILS ABSOLUTE: 0.04 E9/L (ref 0–0.2)
BASOPHILS RELATIVE PERCENT: 0.5 % (ref 0–2)
BILIRUB SERPL-MCNC: 0.6 MG/DL (ref 0–1.2)
BUN BLDV-MCNC: 12 MG/DL (ref 6–20)
CALCIUM SERPL-MCNC: 9.3 MG/DL (ref 8.6–10.2)
CHLORIDE BLD-SCNC: 101 MMOL/L (ref 98–107)
CO2: 26 MMOL/L (ref 22–29)
CREAT SERPL-MCNC: 0.5 MG/DL (ref 0.5–1)
D DIMER: <200 NG/ML DDU
EOSINOPHILS ABSOLUTE: 0.2 E9/L (ref 0.05–0.5)
EOSINOPHILS RELATIVE PERCENT: 2.6 % (ref 0–6)
GFR AFRICAN AMERICAN: >60
GFR NON-AFRICAN AMERICAN: >60 ML/MIN/1.73
GLUCOSE BLD-MCNC: 164 MG/DL (ref 74–99)
HCG, URINE, POC: NEGATIVE
HCT VFR BLD CALC: 33.7 % (ref 34–48)
HEMOGLOBIN: 10.9 G/DL (ref 11.5–15.5)
IMMATURE GRANULOCYTES #: 0.03 E9/L
IMMATURE GRANULOCYTES %: 0.4 % (ref 0–5)
LACTIC ACID: 1.8 MMOL/L (ref 0.5–2.2)
LYMPHOCYTES ABSOLUTE: 3.4 E9/L (ref 1.5–4)
LYMPHOCYTES RELATIVE PERCENT: 44.4 % (ref 20–42)
Lab: NORMAL
MCH RBC QN AUTO: 28 PG (ref 26–35)
MCHC RBC AUTO-ENTMCNC: 32.3 % (ref 32–34.5)
MCV RBC AUTO: 86.6 FL (ref 80–99.9)
MONOCYTES ABSOLUTE: 0.42 E9/L (ref 0.1–0.95)
MONOCYTES RELATIVE PERCENT: 5.5 % (ref 2–12)
NEGATIVE QC PASS/FAIL: NORMAL
NEUTROPHILS ABSOLUTE: 3.57 E9/L (ref 1.8–7.3)
NEUTROPHILS RELATIVE PERCENT: 46.6 % (ref 43–80)
PDW BLD-RTO: 14.3 FL (ref 11.5–15)
PLATELET # BLD: 333 E9/L (ref 130–450)
PMV BLD AUTO: 10.4 FL (ref 7–12)
POSITIVE QC PASS/FAIL: NORMAL
POTASSIUM REFLEX MAGNESIUM: 4 MMOL/L (ref 3.5–5)
PRO-BNP: 29 PG/ML (ref 0–125)
RBC # BLD: 3.89 E12/L (ref 3.5–5.5)
SODIUM BLD-SCNC: 135 MMOL/L (ref 132–146)
TOTAL PROTEIN: 7.8 G/DL (ref 6.4–8.3)
TROPONIN, HIGH SENSITIVITY: <6 NG/L (ref 0–9)
TROPONIN, HIGH SENSITIVITY: <6 NG/L (ref 0–9)
WBC # BLD: 7.7 E9/L (ref 4.5–11.5)

## 2021-08-02 PROCEDURE — 85025 COMPLETE CBC W/AUTO DIFF WBC: CPT

## 2021-08-02 PROCEDURE — 99284 EMERGENCY DEPT VISIT MOD MDM: CPT

## 2021-08-02 PROCEDURE — 93971 EXTREMITY STUDY: CPT

## 2021-08-02 PROCEDURE — 84484 ASSAY OF TROPONIN QUANT: CPT

## 2021-08-02 PROCEDURE — 93005 ELECTROCARDIOGRAM TRACING: CPT | Performed by: PHYSICIAN ASSISTANT

## 2021-08-02 PROCEDURE — 96374 THER/PROPH/DIAG INJ IV PUSH: CPT

## 2021-08-02 PROCEDURE — 72125 CT NECK SPINE W/O DYE: CPT

## 2021-08-02 PROCEDURE — 83880 ASSAY OF NATRIURETIC PEPTIDE: CPT

## 2021-08-02 PROCEDURE — 83605 ASSAY OF LACTIC ACID: CPT

## 2021-08-02 PROCEDURE — 70450 CT HEAD/BRAIN W/O DYE: CPT

## 2021-08-02 PROCEDURE — 85378 FIBRIN DEGRADE SEMIQUANT: CPT

## 2021-08-02 PROCEDURE — 80053 COMPREHEN METABOLIC PANEL: CPT

## 2021-08-02 PROCEDURE — 71046 X-RAY EXAM CHEST 2 VIEWS: CPT

## 2021-08-02 PROCEDURE — 6360000002 HC RX W HCPCS: Performed by: PHYSICIAN ASSISTANT

## 2021-08-02 RX ORDER — NAPROXEN 500 MG/1
500 TABLET ORAL 2 TIMES DAILY
Qty: 14 TABLET | Refills: 0 | Status: SHIPPED | OUTPATIENT
Start: 2021-08-02 | End: 2021-08-31 | Stop reason: SDUPTHER

## 2021-08-02 RX ORDER — MORPHINE SULFATE 4 MG/ML
4 INJECTION, SOLUTION INTRAMUSCULAR; INTRAVENOUS ONCE
Status: COMPLETED | OUTPATIENT
Start: 2021-08-02 | End: 2021-08-02

## 2021-08-02 RX ADMIN — MORPHINE SULFATE 4 MG: 4 INJECTION, SOLUTION INTRAMUSCULAR; INTRAVENOUS at 14:02

## 2021-08-02 ASSESSMENT — PAIN DESCRIPTION - ORIENTATION: ORIENTATION: LEFT

## 2021-08-02 ASSESSMENT — PAIN SCALES - GENERAL
PAINLEVEL_OUTOF10: 8
PAINLEVEL_OUTOF10: 8

## 2021-08-02 ASSESSMENT — PAIN DESCRIPTION - LOCATION: LOCATION: ARM

## 2021-08-02 ASSESSMENT — PAIN DESCRIPTION - PAIN TYPE: TYPE: ACUTE PAIN

## 2021-08-02 NOTE — ED NOTES
Pt requesting food, per Jurline Backers pt to remain NPO at this time.      Elana Bo RN  08/02/21 0212

## 2021-08-02 NOTE — ED NOTES
Bed: 07  Expected date:   Expected time:   Means of arrival:   Comments:  [address] [facility]  08/02/21 2686

## 2021-08-02 NOTE — ED NOTES
Discharge and follow up reviewed, no concerns. Delicia Laurent PA-C okay to d/c home.      Abimael Cortez RN  08/02/21 1800

## 2021-08-02 NOTE — ED PROVIDER NOTES
ED Attending  CC: Ely Doss  Department of Emergency Medicine   ED  Encounter Note  Admit Date/RoomTime: 2021 12:56 PM  ED Room:     NAME: Lori Saldaña  : 1975  MRN: [id number]     Chief Complaint:  Flank Pain (left side and left arm warm to touch and painful)    HISTORY OF PRESENT ILLNESS        Lori Saldaña is a 48 y.o. female who presents to the ED by private vehicle for L arm and L leg pain and burning as well as CP, beginning 4 days ago. Pt's history is translated by her daughter. The complaint has been remaining constant and are mild in severity. Patient states that for the past 4 days she has had a burning and warm sensation to the left upper and left lower extremities. She states that the discomfort is constant but worse at night when she lays on that side of her body. She denies any fall or trauma or head injury. She states that the pain starts under her left armpit and goes to the left side of her chest.  She also states that it goes down her left arm and left leg. She describes it as a burning and warm sensation that hurts when you touch the area. Patient denies any history of similar symptoms. She denies any medications alleviating the symptoms. She states that she did try Tylenol with no alleviation of the pain. Pt notes some L sided chest discomfort and SOB when she takes a deep breath in. She notes a hx of diabetes and HTN. Pt denies hypercholesterolemia, personal cardiac hx, or smoking. She states that her father had cardiac problems. Pt denies fever, chills, NVD, abdominal pain, cough, constipation, urinary sxs, vaginal complaints, back pain, hemoptysis, HA, neck pain, vision changes, speech changes, weakness, confusion, dizziness, or lightheadedness. Pt denies recent travel, recent surgery, calf pain/swelling, hx of blood clots, hx of cancer, or estrogen supplementation.     ROS   Pertinent positives and negatives are stated within HPI, all other systems reviewed and are negative. Past Medical History:  has a past medical history of Breast pain, right, Diabetes ([address] Essential hypertension, and Hyperlipidemia. Surgical History:  has no past surgical history on file. Social History:  reports that she has never smoked. She has never used smokeless tobacco. She reports that she does not drink alcohol and does not use drugs. Family History: family history includes Diabetes in her father and mother; High Blood Pressure in her mother. Allergies: Patient has no known allergies. PHYSICAL EXAM   Oxygen Saturation Interpretation: Normal on room air analysis. ED Triage Vitals [08/02/21 1258]   BP Temp Temp Source Pulse Resp SpO2 Height Weight   (!) 153/81 97.5 °F (36.4 °C) Oral 95 14 97 % 5' 1\" (1.549 m) 144 lb (65.3 kg)       Physical Exam  Constitutional/General: Alert and oriented x3, well appearing, non toxic. Head:  NC/NT. No facial bone TTP; no scalp tenderness  Ears: external ear canals without erythema or swelling; TMs with good light reflex and no bleeding, bulging, or retractions bilaterally; no mastoid tenderness  Eyes: EOMI bilaterally; PERRLA; negative raccoon sign  Nose: no nasal bone TTP; no septal deviation; no septal hematoma; no bleeding; no rhinorrhea  Throat: posterior pharynx without erythema; tonsils without erythema, swelling, or exudate; Airway patent  Neck: Supple, full ROM, non tender to palpation in the midline, no stridor, no crepitus, no meningeal signs  Respiratory: Lungs clear to auscultation bilaterally, no wheezes, rales, or rhonchi. Not in respiratory distress  CV:  Regular rate. Regular rhythm. No murmurs, gallops, or rubs. 2+ distal pulses  Chest: TTP to L axilla and L breast which does reproduce the pain; no rash, mass, erythema, swelling, increased warmth, or wounds; no discharge from nipples or breast dimpling  GI:  Abdomen Soft, Non tender, Non distended. +BS. No rebound, guarding, or rigidity.  No pulsatile masses. No CVA tenderness bilaterally  Back: No cervical, thoracic, or lumbar midline TTP; no step-offs or deformities; no paraspinal muscle TTP; no tenderness to entire back; no rashes, wounds, erythema, or swelling  Musculoskeletal: mild TTP to L axilla and L upper arm which does reproduce the pain; mild TTP to L lower extremity, outer aspect of thigh which does reproduce the pain; Moves all extremities x 4. No pain with ROM; Strength 5/5 to BUE and BLE; sensation intact to BUE and BLE; radial pulses 2+ and equal bilaterally; DP pulses 2+ and equal bilaterally; Warm and well perfused, no clubbing, rash, mass, erythema, increased warmth, wounds, cyanosis, or edema. Capillary refill <3 seconds  Integument: skin warm and dry. No rashes.  No erythema or increased warmth  Lymphatic: no lymphadenopathy noted  Neurologic: GCS 15, no focal deficits, CN 2-12 grossly intact; symmetric strength 5/5 in the upper and lower extremities bilaterally  Psychiatric: Normal Affect    Lab / Imaging Results   (All laboratory and radiology results have been personally reviewed by myself)  Labs:  Results for orders placed or performed during the hospital encounter of 08/02/21   CBC Auto Differential   Result Value Ref Range    WBC 7.7 4.5 - 11.5 E9/L    RBC 3.89 3.50 - 5.50 E12/L    Hemoglobin 10.9 (L) 11.5 - 15.5 g/dL    Hematocrit 33.7 (L) 34.0 - 48.0 %    MCV 86.6 80.0 - 99.9 fL    MCH 28.0 26.0 - 35.0 pg    MCHC 32.3 32.0 - 34.5 %    RDW 14.3 11.5 - 15.0 fL    Platelets 585 431 - 256 E9/L    MPV 10.4 7.0 - 12.0 fL    Neutrophils % 46.6 43.0 - 80.0 %    Immature Granulocytes % 0.4 0.0 - 5.0 %    Lymphocytes % 44.4 (H) 20.0 - 42.0 %    Monocytes % 5.5 2.0 - 12.0 %    Eosinophils % 2.6 0.0 - 6.0 %    Basophils % 0.5 0.0 - 2.0 %    Neutrophils Absolute 3.57 1.80 - 7.30 E9/L    Immature Granulocytes # 0.03 E9/L    Lymphocytes Absolute 3.40 1.50 - 4.00 E9/L    Monocytes Absolute 0.42 0.10 - 0.95 E9/L    Eosinophils Absolute 0.20 0.05 - 0.50 E9/L    Basophils Absolute 0.04 0.00 - 0.20 E9/L   Comprehensive Metabolic Panel w/ Reflex to MG   Result Value Ref Range    Sodium 135 132 - 146 mmol/L    Potassium reflex Magnesium 4.0 3.5 - 5.0 mmol/L    Chloride 101 98 - 107 mmol/L    CO2 26 22 - 29 mmol/L    Anion Gap 8 7 - 16 mmol/L    Glucose 164 (H) 74 - 99 mg/dL    BUN 12 6 - 20 mg/dL    CREATININE 0.5 0.5 - 1.0 mg/dL    GFR Non-African American >60 >=60 mL/min/1.73    GFR African American >60     Calcium 9.3 8.6 - 10.2 mg/dL    Total Protein 7.8 6.4 - 8.3 g/dL    Albumin 4.4 3.5 - 5.2 g/dL    Total Bilirubin 0.6 0.0 - 1.2 mg/dL    Alkaline Phosphatase 79 35 - 104 U/L    ALT 17 0 - 32 U/L    AST 17 0 - 31 U/L   LACTIC ACID, PLASMA   Result Value Ref Range    Lactic Acid 1.8 0.5 - 2.2 mmol/L   Troponin   Result Value Ref Range    Troponin, High Sensitivity <6 0 - 9 ng/L   D-dimer, quantitative   Result Value Ref Range    D-Dimer, Quant <200 ng/mL DDU   Brain Natriuretic Peptide   Result Value Ref Range    Pro-BNP 29 0 - 125 pg/mL   Troponin   Result Value Ref Range    Troponin, High Sensitivity <6 0 - 9 ng/L   POC Pregnancy Urine Qual   Result Value Ref Range    HCG, Urine, POC Negative Negative    Lot Number [id number]     Positive QC Pass/Fail Pass     Negative QC Pass/Fail Pass    EKG 12 Lead   Result Value Ref Range    Ventricular Rate 71 BPM    Atrial Rate 71 BPM    P-R Interval 148 ms    QRS Duration 90 ms    Q-T Interval 400 ms    QTc Calculation (Bazett) 434 ms    P Axis 45 degrees    R Axis 46 degrees    T Axis 40 degrees   EKG 12 Lead   Result Value Ref Range    Ventricular Rate 69 BPM    Atrial Rate 69 BPM    P-R Interval 158 ms    QRS Duration 92 ms    Q-T Interval 402 ms    QTc Calculation (Bazett) 430 ms    P Axis 31 degrees    R Axis 43 degrees    T Axis 40 degrees     Imaging: All Radiology results interpreted by Radiologist unless otherwise noted. US DUP UPPER EXTREMITY LEFT VENOUS   Final Result   No evidence of SVT or DVT. US DUP LOWER EXTREMITY LEFT KHUSHBU   Final Result   No evidence of DVT in the left lower extremity. CT HEAD WO CONTRAST   Final Result   CT head without contrast:      1. No skull fracture or acute intracranial abnormality. CT cervical spine without contrast:      1. No fracture or joint dislocation is seen. 2. Small central disc protrusion at C5-6 resulting in mild central canal   stenosis. 3. Short left-sided C7 rib (anatomic variant), which may result in thoracic   outlet syndrome. Clinical correlation is needed. 4. 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 CT CERVICAL SPINE WO CONTRAST   Final Result   CT head without contrast:      1. No skull fracture or acute intracranial abnormality. CT cervical spine without contrast:      1. No fracture or joint dislocation is seen. 2. Small central disc protrusion at C5-6 resulting in mild central canal   stenosis. 3. Short left-sided C7 rib (anatomic variant), which may result in thoracic   outlet syndrome. Clinical correlation is needed. 4. 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 XR CHEST (2 VW)   Final Result   No evidence of active cardiopulmonary pathology. EKG: This EKG is signed and interpreted by Dr. Steff Velasquez. Rate: 71  Rhythm: Sinus  Interpretation: no acute changes  Comparison: stable as compared to patient's most recent EKG    EKG: This EKG is signed and interpreted by Dr. Steff Velasquez. Rate: 69  Rhythm: Sinus  Interpretation: no acute changes  Comparison: stable as compared to patient's most recent EKG    ED Course / Medical Decision Making     Medications   morphine sulfate (PF) injection 4 mg (4 mg Intravenous Given 8/2/21 1402)        Re-Evaluations:  8/2/21      Time: 1620    Patients condition is improving after treatment. States the pain is getting better but still having some L breast/axillary pain. This pain is not reproducible.     Consultations: None    Procedures:   none    MDM:  Pt presents with L axillary/breast pain that is reproducible. She notes burning and pain down L arm and L leg. No other neuro sxs. NO head injury. No blood thinner use. No internal CP or SOB. Pain is all reproducible with touch or manipulation. Physical exam WNL. Pulses equal bilaterally to upper rand lower extremities. Dimer WNL. No concern for dissection at this time. Vitals WNL. Labs/imaging ordered and reviewed above. Pain treated in ER with some improvement. No acute process on imaging. No DVT to upper or lower extremities. Possible cervical radiculopathy causing L arm pain. Pt neurovascularly intact. She is not having lower back pain. No fall/trauma. Dimer and troponin WNL. EKG WNL. Upon re-evaluation, pt pinpoints pain to L breast with manipulation. Pain is reproduced when the breast is palpated. Labs all WNL. No signs of mastitis, mass, or other infection. Pt told about C7 short rib possibly causing neuro sxs as well to L arm. Will have her follow-up with her PCP for these sxs. Naprosyn for pain. All questions answered. Patient agreeable with the plan. Patient is in no acute distress, non-toxic, and appropriate for outpatient management. Pt educated on reasons to return to the ER, including need to return for new or worsening symptoms. Plan of Care/Counseling:  LACIE KINSEY PA-C and EM Attending Physician reviewed today's visit with the patient in addition to providing specific details for the plan of care and counseling regarding the diagnosis and prognosis. Questions are answered at this time and are agreeable with the plan. ASSESSMENT     1. Breast pain, left    2. Anemia, unspecified type    3. Protrusion of cervical intervertebral disc    4. Hypertension, unspecified type    5.  Cervical radiculopathy      This patient's ED course included: a personal history and physicial examination, re-evaluation prior to disposition and multiple bedside re-evaluations  This patient has improved during their ED course. PLAN   Discharged home. Patient condition is good. New Medications     Discharge Medication List as of 8/2/2021  5:46 PM      START taking these medications    Details   naproxen (NAPROSYN) 500 MG tablet Take 1 tablet by mouth 2 times daily for 7 days, Disp-14 tablet, R-0Print           Electronically signed by Lequita Dubin, PA-C   DD: 8/2/21  **This report was transcribed using voice recognition software. Every effort was made to ensure accuracy; however, inadvertent computerized transcription errors may be present.   END OF PROVIDER NOTE            Wanda Parks PA-C  08/03/21 6544

## 2021-08-03 ENCOUNTER — TELEPHONE (OUTPATIENT)
Dept: INTERNAL MEDICINE | Age: 46
End: 2021-08-03

## 2021-08-03 LAB
EKG ATRIAL RATE: 69 BPM
EKG ATRIAL RATE: 71 BPM
EKG P AXIS: 31 DEGREES
EKG P AXIS: 45 DEGREES
EKG P-R INTERVAL: 148 MS
EKG P-R INTERVAL: 158 MS
EKG Q-T INTERVAL: 400 MS
EKG Q-T INTERVAL: 402 MS
EKG QRS DURATION: 90 MS
EKG QRS DURATION: 92 MS
EKG QTC CALCULATION (BAZETT): 430 MS
EKG QTC CALCULATION (BAZETT): 434 MS
EKG R AXIS: 43 DEGREES
EKG R AXIS: 46 DEGREES
EKG T AXIS: 40 DEGREES
EKG T AXIS: 40 DEGREES
EKG VENTRICULAR RATE: 69 BPM
EKG VENTRICULAR RATE: 71 BPM

## 2021-08-03 PROCEDURE — 93010 ELECTROCARDIOGRAM REPORT: CPT | Performed by: INTERNAL MEDICINE

## 2021-08-03 NOTE — TELEPHONE ENCOUNTER
[facility] ([facility]) ED Follow up Call     Reason for ED visit:  Breast Pain     Used  # [id number]. Spoke with Giovanna. She was unable to schedule, she was leaving next week to see her grandson. She will be rivas for two weeks. I reminded her of the 8-31 appointment. She was aware and confirmed it. Jose Arzola , this is Arlyn Recio  from Dr. Foster Frey office, just calling to see how you are doing after your recent ED visit. Did you receive discharge instructions? Yes  Do you understand the discharge instructions? Yes  Did the ED give you any new prescriptions? Yes  Were you able to fill your prescriptions? Yes      Do you have one of our red, yellow and green  Zone sheets that help you to determine when you should go to the ED? No    Diabetes Zone mailed     Do you need or want to make a follow up appt with your PCP? No    Do you have any further needs in the home i.e. Equipment?   No        FU appts/Provider:    Future Appointments   Date Time Provider Dyllan Madrigal   8/23/2021  1:30 PM Velma Mae., JENNA ZHAO [facility]   8/31/2021  9:30 AM Andrew Riley MD [facility]

## 2021-08-12 ENCOUNTER — TELEPHONE (OUTPATIENT)
Dept: SURGERY | Age: 46
End: 2021-08-12

## 2021-08-12 NOTE — TELEPHONE ENCOUNTER
MA made first attempt to contact patient to schedule appointment based on referral by Dr Kirkland Repress office for colonoscopy consult. Patient did not answer so MA left message requesting return call to schedule with first available provider.     Electronically signed by Joey Navarrete on 8/12/21 at 10:21 AM EDT

## 2021-08-24 ENCOUNTER — HOSPITAL ENCOUNTER (OUTPATIENT)
Age: 46
Discharge: HOME | End: 2021-08-24

## 2021-08-24 DIAGNOSIS — Z11.59 NEED FOR HEPATITIS B SCREENING TEST: ICD-10-CM

## 2021-08-24 DIAGNOSIS — Z11.59 NEED FOR HEPATITIS C SCREENING TEST: ICD-10-CM

## 2021-08-24 DIAGNOSIS — I10 ESSENTIAL HYPERTENSION: ICD-10-CM

## 2021-08-24 LAB
ANION GAP SERPL CALCULATED.3IONS-SCNC: 12 MMOL/L (ref 7–16)
BUN BLDV-MCNC: 11 MG/DL (ref 6–20)
CALCIUM SERPL-MCNC: 9.4 MG/DL (ref 8.6–10.2)
CHLORIDE BLD-SCNC: 100 MMOL/L (ref 98–107)
CO2: 25 MMOL/L (ref 22–29)
CREAT SERPL-MCNC: 0.6 MG/DL (ref 0.5–1)
GFR AFRICAN AMERICAN: >60
GFR NON-AFRICAN AMERICAN: >60 ML/MIN/1.73
GLUCOSE BLD-MCNC: 122 MG/DL (ref 74–99)
POTASSIUM SERPL-SCNC: 3.8 MMOL/L (ref 3.5–5)
SODIUM BLD-SCNC: 137 MMOL/L (ref 132–146)

## 2021-08-24 PROCEDURE — 86803 HEPATITIS C AB TEST: CPT

## 2021-08-24 PROCEDURE — 80048 BASIC METABOLIC PNL TOTAL CA: CPT

## 2021-08-24 PROCEDURE — 86317 IMMUNOASSAY INFECTIOUS AGENT: CPT

## 2021-08-24 PROCEDURE — 36415 COLL VENOUS BLD VENIPUNCTURE: CPT

## 2021-08-25 LAB — HEPATITIS C ANTIBODY INTERPRETATION: NORMAL

## 2021-08-28 LAB
Lab: NORMAL
REPORT: NORMAL
THIS TEST SENT TO: NORMAL

## 2021-08-31 ENCOUNTER — OFFICE VISIT (OUTPATIENT)
Dept: INTERNAL MEDICINE | Age: 46
End: 2021-08-31

## 2021-08-31 VITALS
WEIGHT: 144.6 LBS | DIASTOLIC BLOOD PRESSURE: 68 MMHG | TEMPERATURE: 97.3 F | RESPIRATION RATE: 16 BRPM | BODY MASS INDEX: 27.3 KG/M2 | HEART RATE: 82 BPM | SYSTOLIC BLOOD PRESSURE: 143 MMHG | HEIGHT: 61 IN

## 2021-08-31 DIAGNOSIS — I10 ESSENTIAL HYPERTENSION: ICD-10-CM

## 2021-08-31 DIAGNOSIS — Z12.11 ENCOUNTER FOR SCREENING COLONOSCOPY: ICD-10-CM

## 2021-08-31 DIAGNOSIS — Z12.31 SCREENING MAMMOGRAM, ENCOUNTER FOR: Primary | ICD-10-CM

## 2021-08-31 DIAGNOSIS — M79.622 PAIN IN LEFT AXILLA: ICD-10-CM

## 2021-08-31 DIAGNOSIS — M54.2 NECK PAIN: ICD-10-CM

## 2021-08-31 PROCEDURE — G0010 ADMIN HEPATITIS B VACCINE: HCPCS

## 2021-08-31 PROCEDURE — 99214 OFFICE O/P EST MOD 30 MIN: CPT | Performed by: INTERNAL MEDICINE

## 2021-08-31 PROCEDURE — 6360000002 HC RX W HCPCS

## 2021-08-31 PROCEDURE — 90740 HEPB VACC 3 DOSE IMMUNSUP IM: CPT

## 2021-08-31 PROCEDURE — 99203 OFFICE O/P NEW LOW 30 MIN: CPT | Performed by: INTERNAL MEDICINE

## 2021-08-31 RX ORDER — NAPROXEN 500 MG/1
500 TABLET ORAL 2 TIMES DAILY
Qty: 60 TABLET | Refills: 0 | Status: SHIPPED
Start: 2021-08-31 | End: 2021-11-09 | Stop reason: ALTCHOICE

## 2021-08-31 NOTE — PROGRESS NOTES
[facility]    Attending Physician Statement  I have discussed the case, including pertinent history and exam findings with the resident physician. I agree with the assessment, plan and orders as documented by the resident. I have reviewed all pertinent PMHx, PSHx, FamHx, SocialHx, medications, and allergies and updated history as appropriate. Patient presents for routine follow up of medical problems. Left breast pain radiating to the back and CT showed the C7 rib corrie may resulte in thoracic outlet syndrome, also has small central disc protrusion at C5-6, has been prescribed naproxen has improved the symptoms. EKG for intermittent cross chest pain was normal.  May referral to physical therapy first and reevaluate. Hypertension has been controlled losartan reportedly had normal BP at home setting. Home BP were reported 120s SBP. DM has been treated with metformin  GERD has been treated with PPI  HM- information on COVID vaccine given and need mammogram and C-scope. Total time spent 35 minutes in this visit. Remainder of medical problems as per resident note.       Rola Coleman MD  8/31/2021 10:24 AM

## 2021-08-31 NOTE — PATIENT INSTRUCTIONS
Discharge Instructions:   Be compliant with medications   Please have labs done before coming to next visit   Please check blood pressure regularly at home and maintain a blood pressure log   Follow up with physical therapy   Follow up in 5 weeks, or sooner if symptoms get worse or do not resolve    Patient Education        Prediabetes: Care Instructions  Overview     Prediabetes is a warning sign that you're at risk for getting type 2 diabetes. It means that your blood sugar is higher than it should be. But it's not high enough to be diabetes. The food you eat naturally turns into sugar. Your body uses the sugar for energy. Normally, an organ called the pancreas makes insulin. And insulin allows the sugar in your blood to get into your body's cells. But sometimes the body can't use insulin the right way. So the sugar stays in your blood instead. This is called insulin resistance. The buildup of sugar in your blood means you have prediabetes. The good news is that you may be able to prevent or delay diabetes. Making small lifestyle changes, like getting active and changing your eating habits, may help you get your blood sugar back to normal. You can work with your doctor to make a treatment plan. Follow-up care is a key part of your treatment and safety. Be sure to make and go to all appointments, and call your doctor if you are having problems. It's also a good idea to know your test results and keep a list of the medicines you take. How can you care for yourself at home? · Watch your weight. A healthy weight helps your body use insulin properly. · Limit the amount of calories, sweets, and unhealthy fat you eat. Ask your doctor if you should see a dietitian. A registered dietitian can help you create meal plans that fit your lifestyle. · Get at least 30 minutes of exercise on most days of the week. Exercise helps control your blood sugar. It also helps you maintain a healthy weight.  Walking is a good choice. You also may want to do other activities, such as running, swimming, cycling, or playing tennis or team sports. · Do not smoke. Smoking can make prediabetes worse. If you need help quitting, talk to your doctor about stop-smoking programs and medicines. These can increase your chances of quitting for good. · If your doctor prescribed medicines, take them exactly as prescribed. Call your doctor if you think you are having a problem with your medicine. You will get more details on the specific medicines your doctor prescribes. When should you call for help? Watch closely for changes in your health, and be sure to contact your doctor if:    · You have any symptoms of diabetes. These may include:  ? Being thirsty more often. ? Urinating more. ? Being hungrier. ? Losing weight. ? Being very tired. ? Having blurry vision.     · You have a wound that will not heal.     · You have an infection that will not go away.     · You have problems with your blood pressure.     · You want more information about diabetes and how you can keep from getting it. Where can you learn more? Go to [email or web address] and sign in to your Beneq account. Enter I222 in the ColorModules box to learn more about \"Prediabetes: Care Instructions. \"     If you do not have an account, please click on the \"Sign Up Now\" link. Current as of: August 31, 2020               Content Version: 12.9  © 2827-7754 Healthwise, Incorporated. Care instructions adapted under license by [facility] ([facility]). If you have questions about a medical condition or this instruction, always ask your healthcare professional. [address] any warranty or liability for your use of this information.

## 2021-08-31 NOTE — PROGRESS NOTES
Isha Early 476  Internal Medicine Residency Program  [facility] Note      SUBJECTIVE:  CC: had concerns including 1 Month Follow-Up, Results (labs), and Follow-up (had a pain under left breast). Cristina Louise presented to the [facility] for a routine visit  She stated that she is feeling well today. She visited ED on 8/2 for breast pain. She was given naproxen and it helped with the pain. CT cervical spine revealing short left C7 rib which may result in thoracic outlet syndrome, small central disc protrusion at C5-C6, developmental fusion of atlantooccipital joints (8/21). She reports left breast pain radiating to the back, sharp, stabbing pain, 5/10 in severity, lasting about 5 minutes, once or twice at night. She denies any nausea, vomiting, headache, dizziness, light headedness, constipation, diarrhea, dysuria. She reports intermittent episode of chest pain across the chest, lasting few seconds. EKG revealing NSR. Last stress test normal (4126)    Systolic blood pressure at home ranging from 126-135 mmHg    Will follow up with ophthalmology this october    Review Of Systems:  General: no fevers, chills, weight loss or gain.    Ears/Nose/Throat: no hearing loss, tinnitus, vertigo, nosebleed, nasal congestion, rhinorrhea, sore throat  Respiratory: no cough, pleuritic chest pain, dyspnea, or wheezing  Cardiovascular: no chest pain, angina, dyspnea on exertion, orthopnea, PND, palpitations, or claudication  Gastrointestinal: no nausea, vomiting, heartburn, diarrhea, constipation, abdominal pain, hematochezia or melena  Genitourinary: no urinary urgency, frequency, dysuria, nocturia, hesitancy, or incontinence  Musculoskeletal: no arthritis, arthralgia, myalgia, weakness, or morning stiffness  Skin: no abnormal pigmentation, rash, itching, masses, hair or nail changes    Outpatient Medications Marked as Taking for the 8/31/21 encounter (Office Visit) with Korina Rowland MD   Medication Sig Dispense Refill    naproxen (NAPROSYN) 500 MG tablet Take 1 tablet by mouth 2 times daily 60 tablet 0    losartan (COZAAR) 50 MG tablet Take 1 tablet by mouth daily 90 tablet 1    metFORMIN (GLUCOPHAGE) 500 MG tablet Take 1 tablet by mouth 2 times daily (with meals) 60 tablet 3    pantoprazole (PROTONIX) 20 MG tablet Take 1 tablet by mouth every morning (before breakfast) 90 tablet 1    atorvastatin (LIPITOR) 40 MG tablet Take 1 tablet by mouth daily 90 tablet 1    Blood Pressure KIT Check blood pressure daily 1 kit 0    blood glucose test strips (AGAMATRIX AMP TEST) strip Pt to check BS daily. 100 each 3    AGAMATRIX ULTRA-THIN LANCETS MISC Check BS daily 100 each 3    Lancet Device MISC Pt to check BS daily 100 Device 3    Blood Glucose Monitoring Suppl (AGAMATRIX AMP) SREE 1 glucometer 1 Device 0       I have reviewed all pertinent PMHx, PSHx, FamHx, SocialHx, medications, and allergies and updated history as appropriate. OBJECTIVE:    VS: BP (!) 143/68 (Site: Right Upper Arm, Position: Sitting, Cuff Size: Medium Adult)   Pulse 82   Temp 97.3 °F (36.3 °C) (Oral)   Resp 16   Ht 5' 1\" (1.549 m)   Wt 144 lb 9.6 oz (65.6 kg)   BMI 27.32 kg/m²   General appearance: Alert, Awake, Oriented times 3, no distress  Lungs: Lungs clear to auscultation bilaterally. No rhonchi, crackles or wheezes  Heart: S1 S2  Regular rate and rhythm. No rub, murmur or gallop  Abdomen: Abdomen soft, non-tender. non-distended BS normal. No masses, organomegaly, no guarding rebound or rigidity. Extremities: No edema, Peripheral pulses palpable 2/4  Neuro: alert, awake, no gross focal neurologic deficit    ASSESSMENT/PLAN:    Neck pain   Last ED visit on 8/2 for breast pain.  She was given naproxen and it helped with the pain  CT cervical spine revealing short left C7 rib which may result in thoracic outlet syndrome, small central disc protrusion at C5-C6, developmental fusion of atlantooccipital joints (8/21)  Reports intermittent the past 15 years- not indicated  · ETOH misuse - N/A  · ASA for primary prevention of CVD and CRC with > 10% 10 year risk 50-61yo:N/A  · Diabetic foot exam (8/26/2020)  · Diabetic eye exam: 2 years ago; has follow up appointment with ophthalmology     I have reviewed my findings and recommendations with Sanjeev Mckeon and Dr Caitlyn Manzano MD PGY-3   8/31/2021 10:43 AM

## 2021-08-31 NOTE — PROGRESS NOTES
Interpretor #[id number] used for interpretation for this visit   All instructions given to patient by Luz sharpt scheduled and printed avs given to patient

## 2021-10-04 NOTE — PROGRESS NOTES
Isha Early 476  Internal Medicine Residency Program  [facility] Note      SUBJECTIVE:  CC: had concerns including Diabetes and Medication Refill. Kaveh Miguel presented to the [facility] for a routine visit  : 9014  She stated that she is feeling well today. BP elevated to 153/89 mmHg, repeat BP of 153/91 mmHg. She stated she took losartan this morning. Hasn't seen by physical therapy yet. Stated that she is feeling more tired this days and gained about 3 pounds in the last 1 month. Denies any nausea, vomiting, chest pain, dizziness, lightheadedness, constipation, diarrhea. Review Of Systems:  General: no fevers, chills, weight loss or gain.    Ears/Nose/Throat: no hearing loss, tinnitus, vertigo, nosebleed, nasal congestion, rhinorrhea, sore throat  Respiratory: no cough, pleuritic chest pain, dyspnea, or wheezing  Cardiovascular: no chest pain, angina, dyspnea on exertion, orthopnea, PND, palpitations, or claudication  Gastrointestinal: no nausea, vomiting, heartburn, diarrhea, constipation, abdominal pain, hematochezia or melena  Genitourinary: no urinary urgency, frequency, dysuria, nocturia, hesitancy, or incontinence  Musculoskeletal: no arthritis, arthralgia, myalgia, weakness, or morning stiffness  Skin: no abnormal pigmentation, rash, itching, masses, hair or nail changes    Outpatient Medications Marked as Taking for the 10/5/21 encounter (Office Visit) with José Matute MD   Medication Sig Dispense Refill    metFORMIN (GLUCOPHAGE) 500 MG tablet Take 1 tablet by mouth 2 times daily (with meals) 60 tablet 3    losartan-hydroCHLOROthiazide (HYZAAR) 50-12.5 MG per tablet Take 1 tablet by mouth daily 90 tablet 1    naproxen (NAPROSYN) 500 MG tablet Take 1 tablet by mouth 2 times daily 60 tablet 0    pantoprazole (PROTONIX) 20 MG tablet Take 1 tablet by mouth every morning (before breakfast) 90 tablet 1    atorvastatin (LIPITOR) 40 MG tablet Take 1 tablet by mouth daily 90 tablet 1       I have reviewed all pertinent PMHx, PSHx, FamHx, SocialHx, medications, and allergies and updated history as appropriate. OBJECTIVE:    VS: BP (!) 153/89   Pulse 89   Temp 97.5 °F (36.4 °C) (Temporal)   Resp 16   Ht 5' 1\" (1.549 m)   Wt 147 lb (66.7 kg)   BMI 27.78 kg/m²   General appearance: Alert, Awake, Oriented times 3, no distress  Lungs: Lungs clear to auscultation bilaterally. No rhonchi, crackles or wheezes  Heart: S1 S2  Regular rate and rhythm. No rub, murmur or gallop  Abdomen: Abdomen soft, non-tender. non-distended BS normal. No masses, organomegaly, no guarding rebound or rigidity.   Extremities: No edema, Peripheral pulses palpable 2/4  Neuro: alert, awake, no gross focal neurologic deficit    ASSESSMENT/PLAN:    Essential Hypertension  Uncontrolled  BP elevated to 153/89 mmHg, repeat BP of 153/91  Losartan discontinued  Started on losartan hydrochlorothiazide 5012.5 mg daily  Could not tolerate Amlodipine in the past due to bilateral leg swelling and facial flushing  Discontinued Lisinopril due to dry cough  Last BUN/Cr: 12/0.5 (8/21)  Will check urine micro albumin/creatinine ratio  Return to clinic in 5 weeks for blood pressure check  BMP before next clinic visit    Fatigue  Recently more tired and has been gaining weight  Will check TSH, FT4    Neck pain   Stable  CT cervical spine revealing short left C7 rib which may result in thoracic outlet syndrome, small central disc protrusion at C5-C6, developmental fusion of atlantooccipital joints (8/21)  Naproxen after meal as needed  We will follow up with physical therapy     History of atypical chest pain  No recent episodes of chest pain  Last stress test normal (2013)     Type II DM  Controlled  HbA1c at goal- repeat today: 6.6%--> 6.5% (8/2020)--> 7.3% (6/21)--> 6.5%, next check in April, 2022  Continue Metformin 500 mg twice daily  Urine microalbumin/creatinine ratio wnl  Last BUN/Cr: 12/0.5 (8/21)  Last LDL 60 (7/21)  Diabetic Foot Exam 10/10, normal (8/2020)  Continue with lifestyle modifications   Seen by ophthalmology     GERD  Stable  Continue Pantoprazole 20 mg  She tried to stop pantoprazole but had symptoms of heartburn and started back     Hyperlipidemia   Last NVV 77 (8108)--> 60 (7/21)  Continue Atorvastatin     Overweight (BMI 25.0-29. 9)  Advised on life style modification and weight loss     Screening mammogram, encounter for  EWELINA DIGITAL SCREEN BILATERAL PER PROTOCOL;  Future     Encounter for screening colonoscopy  Simi Aldana MD, General Surgery, [place]  Pap smear for cervical cancer screening  Last Pap smear done 2 years ago and it was normal according to the patient  [address] [facility]    Flu vaccine need  INFLUENZA, QUADV, 3 YRS AND OLDER, IM PF, PREFILL SYR OR SDV, 0.5ML (AFLURIA QUADV, PF)     Health Care Maintenance   · DM screening if over weight or obese 38-68 years old; HbA1C 6.5%, diabetic  · Screening mammogram every 2 years- Screening mammogram unremarkable in 2020, repeat mammogram ordered  · PAP smear every 3 years-referred to OB/GYN  · Colon cancer screening- ordered  · One-time screening for HCV infection to adults born between 1945 and 1965- ordered  · Annual lung cancer 54 to [de-identified] years who have a 30 pack-year smoking history and currently smoke or have quit within the past 15 years- not indicated  · ETOH misuse - N/A  · ASA for primary prevention of CVD and CRC with > 10% 10 year risk 50-61yo:N/A  · Diabetic foot exam (8/26/2020)  · Diabetic eye exam: 2 years ago; has follow up appointment with ophthalmology     I have reviewed my findings and recommendations with Todd Madison and Dr Ashia Mckeon MD PGY-3  10/5/2021 11:42 AM

## 2021-10-05 ENCOUNTER — OFFICE VISIT (OUTPATIENT)
Dept: INTERNAL MEDICINE | Age: 46
End: 2021-10-05

## 2021-10-05 VITALS
HEIGHT: 61 IN | RESPIRATION RATE: 16 BRPM | DIASTOLIC BLOOD PRESSURE: 89 MMHG | SYSTOLIC BLOOD PRESSURE: 153 MMHG | BODY MASS INDEX: 27.75 KG/M2 | WEIGHT: 147 LBS | HEART RATE: 89 BPM | TEMPERATURE: 97.5 F

## 2021-10-05 DIAGNOSIS — I10 PRIMARY HYPERTENSION: ICD-10-CM

## 2021-10-05 DIAGNOSIS — Z12.4 PAP SMEAR FOR CERVICAL CANCER SCREENING: ICD-10-CM

## 2021-10-05 DIAGNOSIS — Z23 FLU VACCINE NEED: ICD-10-CM

## 2021-10-05 DIAGNOSIS — E11.9 TYPE 2 DIABETES MELLITUS WITHOUT COMPLICATION, WITHOUT LONG-TERM CURRENT USE OF INSULIN (HCC): Primary | ICD-10-CM

## 2021-10-05 DIAGNOSIS — N63.20 LEFT BREAST LUMP: ICD-10-CM

## 2021-10-05 DIAGNOSIS — R53.83 TIREDNESS: ICD-10-CM

## 2021-10-05 LAB — HBA1C MFR BLD: 6.5 %

## 2021-10-05 PROCEDURE — G0008 ADMIN INFLUENZA VIRUS VAC: HCPCS

## 2021-10-05 PROCEDURE — 6360000002 HC RX W HCPCS

## 2021-10-05 PROCEDURE — 99212 OFFICE O/P EST SF 10 MIN: CPT | Performed by: INTERNAL MEDICINE

## 2021-10-05 PROCEDURE — 83036 HEMOGLOBIN GLYCOSYLATED A1C: CPT | Performed by: INTERNAL MEDICINE

## 2021-10-05 PROCEDURE — 99214 OFFICE O/P EST MOD 30 MIN: CPT | Performed by: INTERNAL MEDICINE

## 2021-10-05 PROCEDURE — 90686 IIV4 VACC NO PRSV 0.5 ML IM: CPT

## 2021-10-05 RX ORDER — LOSARTAN POTASSIUM AND HYDROCHLOROTHIAZIDE 12.5; 5 MG/1; MG/1
1 TABLET ORAL DAILY
Qty: 90 TABLET | Refills: 1 | Status: SHIPPED
Start: 2021-10-05 | End: 2022-02-21 | Stop reason: SDUPTHER

## 2021-10-05 NOTE — PATIENT INSTRUCTIONS
Instrucciones de descarga:  · Cumplir con los medicamentos. · Deje de citlalli losartán  · Comience a citlalli losartán-hidroclorotiazida 50-12,5 mg al día  · Realice análisis de laboratorio antes de la próxima visita  · Seguimiento en 5 semanas, o antes si los síntomas empeoran o no se resuelven  · Controle la presión arterial con regularidad en casa y mantenga un registro de la presión arterial  · [address] [facility] [address] Hurd MD  · [employer] [address]  · [phone]    Discharge Instructions:   Be compliant with medications   Please have labs done before coming to next visit   Follow up in 5 weeks, or sooner if symptoms get worse or do not resolve   Please check blood pressure regularly at home and maintain a blood pressure log  [address], MD   [facility], [address] [place] [address] S   [phone]      Patient Education        Dieta DASH: Instrucciones de cuidado  DASH Diet: Care Instructions  Instrucciones de cuidado     La dieta DASH es un plan de alimentación que puede ayudar a bajar la presión arterial. DASH es la sigla en inglés de \"Dietary Approaches to Stop Hypertension\" (medidas dietéticas para disminuir la hipertensión). Hipertensión significa presión arterial larry. La dieta DASH se concentra en el consumo de alimentos con alto contenido de calcio, potasio y [place]. Estos nutrientes pueden disminuir la presión arterial. Los alimentos con el mayor contenido de [place] nutrientes son las frutas, las verduras, los productos lácteos de bajo contenido de [place], las nueces, las semillas y las legumbres. Bruno citlalli suplementos de calcio, potasio y magnesio, en lugar de comer alimentos ricos en estos nutrientes, no tiene el mismo efecto. La dieta DASH también incluye granos integrales, pescado y aves.   La dieta DASH es mark de los cambios de estilo de virgin[place] que quizá le recomiende billings médico para reducir la presión arterial larry. Es posible que billings médico también quiera que usted reduzca la cantidad de sodio en billings Reyes Para. Reducir el sodio mientras sigue la dieta DASH puede bajar la presión arterial incluso más que únicamente con la dieta DASH. La atención de seguimiento es christianne parte clave de billings tratamiento y seguridad. Asegúrese de hacer y acudir a todas las citas, y llame a billings médico si está teniendo problemas. También es christianne buena idea saber los resultados de pawan exámenes y mantener christianne lista de los medicamentos que padmaja. ¿Cómo puede cuidarse en el hogar? Cómo seguir la dieta DASH  · Coma entre 4 y 5 porciones de fruta al día. Christianne porción incluye 1 fruta de [place], ½ taza de fruta enlatada o cortada en trozos, 1/4 taza de fruta seca o 4 onzas (½ taza) de jugo de frutas. Elija fruta con más frecuencia que jugo. · Consuma entre 4 y 11 porciones de verduras al día. Christianne porción incluye 1 taza de Rosi o de verduras de hojas crudas, ½ taza de verduras cocidas o cortadas en trozos, o 4 onzas (½ taza) de jugo de verduras. Elija comer verduras con más frecuencia que citlalli billings jugo. · Consuma entre 2 y 3 porciones de lácteos semidescremados y descremados al día. Christianne porción incluye 8 onzas de [place], 1 taza de yogur o 1½ onzas de Roberto-barre. · Coma entre 6 y 6 porciones de granos todos los GRASSE. Christianne porción incluye 1 rebanada de pan, 1 onza de cereal seco, o ½ taza de arroz, pasta o cereal cocido. Trate de elegir productos de grano integral con la mayor frecuencia posible. · Limite la cantidad de [place]), aves y pescado a 2 porciones al día. Ramona Day porción son 3 onzas, lo que es aproximadamente el tamaño de un eder de naipes. · Coma entre 4 y 5 porciones de nueces, semillas y legumbres (frijoles [habichuelas], lentejas y arvejas [chícharos] secos y cocidos) a la semana. Christianne porción incluye 1/3 taza de nueces, 2 cucharadas de semillas o ½ taza de frijoles o arvejas cocidos.   · [address] [facility] y aceites a 2 o 3 almendras picadas a las verduras cocidas. ? Pruebe algunas comidas vegetarianas con frijoles y arvejas. Carol Ponce o frijoles rojos a las ensaladas. Prepare burritos y tacos con puré de frijoles pintos o negros. ¿Dónde puede encontrar más información en inglés? Ganesh Creed a [email or web address] e ingrese a billings cuenta de MyChart. Stephen Erps H239 en el Babnatalie Amyks \"Search Health Information\" para más información (en inglés) sobre \"Dieta DASH: Instrucciones de cuidado. \"     Si no tiene christianne cuenta, santa jayant en el enlace \"Sign Up Now\". Revisado: 29 abril, 2021               Versión del contenido: 13.0  © 8786-0789 Healthwise, [facility]. Las instrucciones de cuidado fueron adaptadas bajo licencia por [facility] ([facility]). Si usted tiene [place] afección médica o sobre estas instrucciones, siempre pregunte a billings profesional de elsie. "[employer]", [facility] niega toda garantía o responsabilidad por billings uso de esta información.

## 2021-10-05 NOTE — PROGRESS NOTES
Discharge instructions reviewed with patient per Dr. Kevin Esparza. Follow up appt scheduled and AVS given to patient.      Flu vaccine given IM left deltoid    POCT A1C: 6.5

## 2021-10-05 NOTE — PROGRESS NOTES
Isha Early 476  Internal Medicine Residency Clinic    Attending Physician Statement  I have discussed the case, including pertinent history and exam findings with the resident physician. I agree with the assessment, plan and orders as documented by the resident. I have reviewed all pertinent PMHx, PSHx, FamHx, SocialHx, medications, and allergies and updated history as appropriate. Patient presents for routine follow up of medical problems. DM - metformin and A1C is controlled and continue current treatment. Hypertension, BP has been elevated and losartan added and BP today is suboptimally controlled and may increase losartan to 100 mg and HCTZ 12.5 mg and continue monitor BP at home. Needs BMP in 5 weeks. Also reported fatigue in exertion, need to check TSH, denies other symptoms. Neck pain - chronic and remains stable  C-scope routine screening, missed the appointment, and need another appointment. Flu vaccine today. Total time spent 35 minutes in this visit. Remainder of medical problems as per resident note.     Kristel Hernandez MD  10/5/2021 9:25 AM

## 2021-10-07 ENCOUNTER — TELEPHONE (OUTPATIENT)
Dept: INTERNAL MEDICINE | Age: 46
End: 2021-10-07

## 2021-10-07 ENCOUNTER — HOSPITAL ENCOUNTER (OUTPATIENT)
Dept: GENERAL RADIOLOGY | Age: 46
Discharge: HOME | End: 2021-10-09
Payer: COMMERCIAL

## 2021-10-07 DIAGNOSIS — Z12.31 SCREENING MAMMOGRAM, ENCOUNTER FOR: ICD-10-CM

## 2021-10-07 PROCEDURE — 77067 SCR MAMMO BI INCL CAD: CPT

## 2021-10-07 NOTE — TELEPHONE ENCOUNTER
: Torie Tellez Ms. Shelbi Pelayo with the help of [address] [facility]  service regarding mammogram result that it revealed focal asymmetric lesion of the left breast. Diagnostic left breast US ordered for better visualization and let her know that scripts will be mailed to her place. She verbalized understanding and appreciated the call.

## 2021-10-08 DIAGNOSIS — R92.8 ABNORMAL MAMMOGRAM: Primary | ICD-10-CM

## 2021-10-19 ENCOUNTER — TELEPHONE (OUTPATIENT)
Dept: GENERAL RADIOLOGY | Age: 46
End: 2021-10-19

## 2021-10-19 NOTE — TELEPHONE ENCOUNTER
used: Petrina Cushing #[id number]. Call to patient in reference to her mammogram performed at [facility] on October 7, 2021. Instructed patient that the radiologist has recommended some additional breast imaging, in order to make a final determination/result. Calling to schedule left breast US. Contact information left.        Won Khan RN, BSN, [address]

## 2021-11-01 ENCOUNTER — HOSPITAL ENCOUNTER (OUTPATIENT)
Dept: GENERAL RADIOLOGY | Age: 46
Discharge: HOME | End: 2021-11-03
Payer: COMMERCIAL

## 2021-11-01 ENCOUNTER — TELEPHONE (OUTPATIENT)
Dept: INTERNAL MEDICINE CLINIC | Age: 46
End: 2021-11-01

## 2021-11-01 DIAGNOSIS — R92.8 ABNORMAL MAMMOGRAM: ICD-10-CM

## 2021-11-01 DIAGNOSIS — R92.8 ABNORMAL MAMMOGRAM: Primary | ICD-10-CM

## 2021-11-01 PROCEDURE — 76642 ULTRASOUND BREAST LIMITED: CPT

## 2021-11-01 NOTE — TELEPHONE ENCOUNTER
Called Ms. Darleen Dozier regarding left breast US result that it revealed focal asymmetry in the left breast 6 o'clock is suspicious. She stated that she has an appointment for breast biopsy on 11/11/21. She stated that she is feeling well and denies any active complaints. All questions answered and she appreciated the call.

## 2021-11-09 ENCOUNTER — OFFICE VISIT (OUTPATIENT)
Dept: INTERNAL MEDICINE | Age: 46
End: 2021-11-09

## 2021-11-09 VITALS
SYSTOLIC BLOOD PRESSURE: 131 MMHG | WEIGHT: 146 LBS | DIASTOLIC BLOOD PRESSURE: 76 MMHG | BODY MASS INDEX: 27.56 KG/M2 | RESPIRATION RATE: 16 BRPM | HEART RATE: 96 BPM | HEIGHT: 61 IN | TEMPERATURE: 97.9 F

## 2021-11-09 DIAGNOSIS — K21.9 GASTROESOPHAGEAL REFLUX DISEASE WITHOUT ESOPHAGITIS: ICD-10-CM

## 2021-11-09 PROCEDURE — 99212 OFFICE O/P EST SF 10 MIN: CPT | Performed by: INTERNAL MEDICINE

## 2021-11-09 PROCEDURE — 99213 OFFICE O/P EST LOW 20 MIN: CPT | Performed by: INTERNAL MEDICINE

## 2021-11-09 RX ORDER — PANTOPRAZOLE SODIUM 20 MG/1
20 TABLET, DELAYED RELEASE ORAL
Qty: 90 TABLET | Refills: 1 | Status: SHIPPED
Start: 2021-11-09 | End: 2022-06-16 | Stop reason: ALTCHOICE

## 2021-11-09 NOTE — PATIENT INSTRUCTIONS
Instrucciones de descarga:  · Cumplir con los medicamentos. · Realice análisis de laboratorio antes de la próxima visita  · Compruebe la presión arterial en casa y mantenga un registro de la presión arterial.  · Seguimiento en 3 meses, o antes si los síntomas [address] o no se resuelven    Discharge Instructions:   Be compliant with medications   Please have labs done before coming to next visit   Please check blood pressure at home and maintain a blood pressure log   Follow up in 3 months, or sooner if symptoms get worse or do not resolve    Patient Education        Dieta DASH: Instrucciones de cuidado  DASH Diet: Care Instructions  Instrucciones de cuidado     La dieta DASH es un plan de alimentación que puede ayudar a bajar la presión arterial. DASH es la sigla en inglés de \"Dietary Approaches to Stop Hypertension\" (medidas dietéticas para disminuir la hipertensión). Hipertensión significa presión arterial larry. La dieta DASH se concentra en el consumo de alimentos con alto contenido de calcio, potasio y [place]. Estos nutrientes pueden disminuir la presión arterial. Los alimentos con el mayor contenido de [place] nutrientes son las frutas, las verduras, los productos lácteos de bajo contenido de [place], las nueces, las semillas y las legumbres. Bruno citlalli suplementos de calcio, potasio y magnesio, en lugar de comer alimentos ricos en estos nutrientes, no tiene el mismo efecto. La dieta DASH también incluye granos integrales, pescado y aves. La dieta DASH es mark de los cambios de estilo de virginie que quizá le recomiende billings médico para reducir la presión arterial larry. Es posible que billings médico también quiera que usted reduzca la cantidad de sodio en billings Deanne Ni. Reducir el sodio mientras sigue la dieta DASH puede bajar la presión arterial incluso más que únicamente con la dieta DASH. La atención de seguimiento es christianne parte clave de billings tratamiento y seguridad.  Asegúrese de hacer y acudir a todas las citas, y llame a billings médico si está teniendo problemas. También es christianne buena idea saber los resultados de pawan exámenes y mantener christianne lista de los medicamentos que padmaaj. ¿Cómo puede cuidarse en el hogar? Cómo seguir la dieta DASH  · Coma entre 4 y 5 porciones de fruta al día. Christianne porción incluye 1 fruta de [place], ½ taza de fruta enlatada o cortada en trozos, 1/4 taza de fruta seca o 4 onzas (½ taza) de jugo de frutas. Elija fruta con más frecuencia que jugo. · Consuma entre 4 y 11 porciones de verduras al día. Christianne porción incluye 1 taza de Rosi o de verduras de hojas crudas, ½ taza de verduras cocidas o cortadas en trozos, o 4 onzas (½ taza) de jugo de verduras. Elija comer verduras con más frecuencia que citlalli billings jugo. · Consuma entre 2 y 3 porciones de lácteos semidescremados y descremados al día. Christianne porción incluye 8 onzas de [place], 1 taza de yogur o 1½ onzas de [place]. · Coma entre 6 y 6 porciones de granos todos los [address] Christianne porción incluye 1 rebanada de pan, 1 onza de cereal seco, o ½ taza de arroz, pasta o cereal cocido. Trate de elegir productos de grano integral con la mayor frecuencia posible. · Limite la cantidad de [place]), aves y pescado a 2 porciones al día. [place] porción son 3 onzas, lo que es aproximadamente el tamaño de un eder de naipes. · Coma entre 4 y 5 porciones de nueces, semillas y legumbres (frijoles [habichuelas], lentejas y arvejas [chícharos] secos y cocidos) a la semana. Christianne porción incluye 1/3 taza de nueces, 2 cucharadas de semillas o ½ taza de frijoles o arvejas cocidos. · [address] [facility] y aceites a 2 o 3 porciones al día. Christianne porción es 1 cucharadita de aceite vegetal o 2 cucharadas de aderezo para ensaladas. · Limite los dulces y el azúcar adicional a 5 porciones o menos por semana. [place] porción es 1 cucharada de [place] o [place], ½ taza de sorbete o 1 taza de limonada. · Consuma menos de 2,300 miligramos (mg) de sodio al día.  Si limita billings consumo de sodio a 1,500 mg al día, puede reducir billings presión arterial aún más. · Tenga en cuenta que todas estas son las cantidades sugeridas de porciones para las personas que consumen entre 1800 y 2000 calorías al día. La cantidad recomendada de porciones para usted podría ser diferente si necesita más o menos calorías. Consejos para tener éxito  · Inicie con cambios pequeños. No intente hacer cambios radicales en billings dieta de manera repentina. Podría sentir que extraña pawan comidas favoritas y, por lo [place] harry christianne mayor probabilidad de que no cumpla el plan. Jewell Sever y [place]. Danii Inch que esos cambios se conviertan en un hábito, incorpore algunos [employer]. · Pruebe algunas de las siguientes cosas:  ? Fíjese el objetivo de comer christianne porción de fruta o de verduras en todas las comidas y los refrigerios. [place] facilitará alcanzar la cantidad diaria recomendada de frutas y verduras. ? Pruebe comer yogur cubierto con frutas frescas y nueces rey refrigerio o rey postre saludable. ? Agregue pat, tomate, pepino y cebolla a pawan sándwiches. ? Combine christianne masa de pizza precocida con queso mozzarella de bajo contenido de grasa (\"low-fat\") y cúbralo con abundantes verduras. Intente utilizar tomates, calabaza, espinaca, brócoli, zanahorias, coliflor y cebollas. ? Opte por comer christianne variedad de verduras cortadas en trozos con un aderezo de bajo contenido de grasa rey refrigerio, en lugar de \"chips\" (tipo jenny fritas) y un \"dip\" (producto untable). ? [place] semillas de girasol o almendras picadas [employer]. O intente agregar nueces o almendras picadas a las verduras cocidas. ? Pruebe algunas comidas vegetarianas con frijoles y arvejas. Nahum Danas o frijoles rojos a las ensaladas. Prepare burritos y tacos con puré de frijoles pintos o negros. ¿Dónde puede encontrar más información en inglés? Shelbi Ped a [email or web address] e ingrese a billings cuenta de MyChart.  Moraih Job A054 en [facility] \"Search Health Information\" para más información (en inglés) sobre \"Dieta DASH: Instrucciones de cuidado. \"     Si no tiene christianne cuenta, santa clic en el enlace \"Sign Up Now\". Revisado: 29 abril, 2021               Versión del contenido: 13.0  © 0897-4222 Healthwise, Incorporated. Las instrucciones de cuidado fueron adaptadas bajo licencia por [facility]ARE ([facility]). Si usted tiene Norman Martin afección médica o sobre estas instrucciones, siempre pregunte a billings profesional de elsie. Healthwise, Incorporated niega toda garantía o responsabilidad por billings uso de esta información.

## 2021-11-09 NOTE — PROGRESS NOTES
Isha Early 89  Internal Medicine Residency Program  [facility] Note      SUBJECTIVE:  CC: had concerns including Diabetes. Ruddy Ching presented to the [facility] for a routine visit   [id number]  She stated she is feeling well today. She denies any nausea, vomiting, headache, dizziness, constipation, diarrhea, breast discharge, skin changes. Weight has been stable. BP better controlled today. Has breast biopsy scheduled on 11/11/2021    She reports neck pain is much better now. Has not been taking naproxen in the last 2 months. Review Of Systems:  General: no fevers, chills, weight loss or gain.    Ears/Nose/Throat: no hearing loss, tinnitus, vertigo, nosebleed, nasal congestion, rhinorrhea, sore throat  Respiratory: no cough, pleuritic chest pain, dyspnea, or wheezing  Cardiovascular: no chest pain, angina, dyspnea on exertion, orthopnea, PND, palpitations, or claudication  Gastrointestinal: no nausea, vomiting, heartburn, diarrhea, constipation, abdominal pain, hematochezia or melena  Genitourinary: no urinary urgency, frequency, dysuria, nocturia, hesitancy, or incontinence  Musculoskeletal: no arthritis, arthralgia, myalgia, weakness, or morning stiffness  Skin: no abnormal pigmentation, rash, itching, masses, hair or nail changes    Outpatient Medications Marked as Taking for the 11/9/21 encounter (Office Visit) with Maged Gaston MD   Medication Sig Dispense Refill    pantoprazole (PROTONIX) 20 MG tablet Take 1 tablet by mouth every morning (before breakfast) 90 tablet 1    metFORMIN (GLUCOPHAGE) 500 MG tablet Take 1 tablet by mouth 2 times daily (with meals) 60 tablet 3    losartan-hydroCHLOROthiazide (HYZAAR) 50-12.5 MG per tablet Take 1 tablet by mouth daily 90 tablet 1    atorvastatin (LIPITOR) 40 MG tablet Take 1 tablet by mouth daily 90 tablet 1       I have reviewed all pertinent PMHx, PSHx, FamHx, SocialHx, medications, and allergies and updated history as appropriate. OBJECTIVE:    VS: /76   Pulse 96   Temp 97.9 °F (36.6 °C) (Temporal)   Resp 16   Ht 5' 1\" (1.549 m)   Wt 146 lb (66.2 kg)   BMI 27.59 kg/m²   General appearance: Alert, Awake, Oriented times 3, no distress  Lungs: Lungs clear to auscultation bilaterally. No rhonchi, crackles or wheezes  Heart: S1 S2  Regular rate and rhythm. No rub, murmur or gallop  Abdomen: Abdomen soft, non-tender. non-distended BS normal. No masses, organomegaly, no guarding rebound or rigidity.   Extremities: No edema, Peripheral pulses palpable 2/4  Neuro: alert, awake, no gross focal neurologic deficit    ASSESSMENT/PLAN:  Essential Hypertension  Controlled  Blood pressure today 131/76 mmHg  [de-identified]year-old losartan hydrochlorothiazide 50/12.5 mg daily  Could not tolerate Amlodipine in the past due to bilateral leg swelling and facial flushing  Discontinued Lisinopril due to dry cough  Last BUN/Cr: 12/0.5 (8/21)  Will check urine micro albumin/creatinine ratio, BMP    Left breast lesion  No recent weight loss, appetite changes  US and mammogram of breast revealing focal asymmetry in the left breast  Follows with breast cancer clinic  Scheduled for biopsy on 11/11/2021    Type II DM  Controlled  HbA1c at goal- repeat today: 6.6%--> 6.5% (8/2020)--> 7.3% (6/21)--> 6.5%, next check in April, 2022  Continue Metformin 500 mg twice daily  Urine microalbumin/creatinine ratio wnl  Last BUN/Cr: 12/0.5 (8/21)  Last LDL 60 (7/21)  Diabetic Foot Exam 10/10, normal (8/2020)  Continue with lifestyle modifications   Seen by ophthalmology in 10/21      Fatigue  Improving  Will check TSH, FT4     Neck pain   Stable  CT cervical spine revealing 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 9)  Advised on life style modification and weight loss     Pap smear for cervical cancer screening  Last Pap smear done 2 years ago and it was normal according to the patient  [address] Maintenance   · DM screening if over weight or obese 38-68 years old; HbA1C 6.5%, diabetic  · Screening mammogram every 2 years- Screening mammogram remarkable for focal asymetry of left breast  · PAP smear every 3 years-referred to OB/GYN  · Colon cancer screening- ordered  · One-time screening for HCV infection to adults born between 1945 and 1965-nonreactive  · Annual lung cancer 54 to [de-identified] years who have a 30 pack-year smoking history and currently smoke or have quit within the past 15 years- not indicated  · ETOH misuse - N/A  · ASA for primary prevention of CVD and CRC with > 10% 10 year risk 50-59yo:N/A  · Diabetic foot exam (8/26/2020)  · Diabetic eye exam: 2 years ago; Seen by ophthalmology in 10/21; no retinopathy     I have reviewed my findings and recommendations with Ady Marti and Dr Luis A Wong MD PGY-3  11/9/2021 10:37 AM

## 2021-11-09 NOTE — PROGRESS NOTES
Discharge instructions reviewed with patient per Dr. Esteban Fisher. Follow up appt scheduled and AVS given to patient.  [id number] used to soap patient.

## 2021-11-10 ENCOUNTER — TELEPHONE (OUTPATIENT)
Dept: BREAST CENTER | Age: 46
End: 2021-11-10

## 2021-11-10 NOTE — TELEPHONE ENCOUNTER
Spoke with patient who is a new referral. Left breast biopsy recommended and scheduled for 11/11/2021. Informed patient we can schedule her a follow up appointment if needed after her biopsy results are back.  Patient agreeable

## 2021-11-11 ENCOUNTER — HOSPITAL ENCOUNTER (OUTPATIENT)
Dept: GENERAL RADIOLOGY | Age: 46
Discharge: HOME | End: 2021-11-13
Payer: COMMERCIAL

## 2021-11-11 DIAGNOSIS — R92.8 ABNORMAL MAMMOGRAM: ICD-10-CM

## 2021-11-11 PROCEDURE — 2720000010 MAM STEREO BREAST BX W LOC DEVICE 1ST LESION LEFT

## 2021-11-11 PROCEDURE — 88305 TISSUE EXAM BY PATHOLOGIST: CPT

## 2021-11-11 PROCEDURE — 2500000003 HC RX 250 WO HCPCS

## 2021-11-11 PROCEDURE — 77065 DX MAMMO INCL CAD UNI: CPT

## 2021-11-11 NOTE — PROGRESS NOTES
With Lisa Dotson, I met with patient and her family prior to her breast biopsy. Instructed on  breast biopsy procedure. Instructed that results will be available in approximately 3-5 business days. She indicates that she wants to receive her results from her PCP. Instructed that her physician will  be notified of results. Provided with folder containing my contact information and post biopsy discharge instructions in Yakut. Instructed to call me if she has any questions or concerns about her biopsy. Verbalizes understanding.  RYDER Che, OCN, CN-BN

## 2021-12-06 DIAGNOSIS — E11.9 TYPE 2 DIABETES MELLITUS WITHOUT COMPLICATION, WITHOUT LONG-TERM CURRENT USE OF INSULIN (HCC): ICD-10-CM

## 2021-12-07 ENCOUNTER — TELEPHONE (OUTPATIENT)
Dept: INTERNAL MEDICINE | Age: 46
End: 2021-12-07

## 2021-12-07 RX ORDER — ATORVASTATIN CALCIUM 40 MG/1
TABLET, FILM COATED ORAL
Qty: 90 TABLET | Refills: 0 | Status: SHIPPED
Start: 2021-12-07 | End: 2022-02-21 | Stop reason: SDUPTHER

## 2021-12-07 NOTE — TELEPHONE ENCOUNTER
Called patient and advised that if she wishes to get her meds early she should contact the pharmacy to fing out how to handle this  understanding verbalized

## 2021-12-07 NOTE — TELEPHONE ENCOUNTER
Please call pt she is going out of town and asking for all meds to be refilled so she has them while she is gone per

## 2022-02-07 ENCOUNTER — OFFICE VISIT (OUTPATIENT)
Dept: OBGYN | Age: 47
End: 2022-02-07

## 2022-02-07 VITALS
SYSTOLIC BLOOD PRESSURE: 130 MMHG | WEIGHT: 141 LBS | HEART RATE: 92 BPM | DIASTOLIC BLOOD PRESSURE: 60 MMHG | BODY MASS INDEX: 26.64 KG/M2

## 2022-02-07 DIAGNOSIS — R10.2 PELVIC PAIN: Primary | ICD-10-CM

## 2022-02-07 DIAGNOSIS — Z12.4 PAP SMEAR FOR CERVICAL CANCER SCREENING: ICD-10-CM

## 2022-02-07 DIAGNOSIS — R10.32 LEFT LOWER QUADRANT PAIN: ICD-10-CM

## 2022-02-07 PROCEDURE — 99213 OFFICE O/P EST LOW 20 MIN: CPT | Performed by: OBSTETRICS & GYNECOLOGY

## 2022-02-07 NOTE — PROGRESS NOTES
Chief complaint: 48 year- old presents for well woman exam. Last here in 2017 for pap and HPV. Shelley Dale here to translate today. History:    G3, P3,Ab0. Doing well. Periods:  Becoming irregular over the last 6 months. Low sex drive. Sexually active: yes . No abdominal or pelvic pain. No dyspareunia. No abnormal vaginal  discharge. Previous Pap smears normal. Last Pap smear 2017, negative with a negative HPV. All noted paps have been negative. .  No urinary or GI symptoms. Medical/ surgical history and medications reviewed. Mammogram is current and did have a benign biopsy last Fall. Gets left lower back discomfort which radiates down her left leg with menses. Nothing on the right side. Pain starts 3-4 days before flow and ends when the flow ends. ROS: Negative:    Examination:    Vital signs reviewed. GA : Healthy. Oriented x 3 no distress. HEENT : hearing grossly intact, no jaundice, no oral lesions or nasal discharge. Neck : Supple. Thyroid : normal, no goiter. Breasts : no masses. No skin changes or lymphadenopathy. No nipple discharge. Abdomen :Soft. No masses. No organomegaly. V&V : Normal female external genitalia. BUS: Normal.  PS : Adequate. Cervix : No lesions, pap smear done as a TPP, no hpv today. Uterus : Normal size. Adnexa : Free, no masses. No tenderness either. IMP: irregular menses, left lower quadrant pain. Pap for Ca screening. PLAN: Complete pelvis sonogram for the pain and see back to review.

## 2022-02-07 NOTE — PROGRESS NOTES
Patient alert and pleasant with no complaints  Here today for annual GYN exam  Pelvic exam, pap smear obtained, labeled  and hand delivered to lab. Discharge instructions have been discussed with the patient. Patient advised to call our office with any questions or concerns. Voiced understanding.   Ulices bansal  here to assist with this visit

## 2022-02-10 ENCOUNTER — OFFICE VISIT (OUTPATIENT)
Dept: SURGERY | Age: 47
End: 2022-02-10

## 2022-02-10 VITALS
TEMPERATURE: 98.2 F | DIASTOLIC BLOOD PRESSURE: 91 MMHG | BODY MASS INDEX: 26.24 KG/M2 | OXYGEN SATURATION: 100 % | WEIGHT: 139 LBS | HEIGHT: 61 IN | HEART RATE: 102 BPM | SYSTOLIC BLOOD PRESSURE: 142 MMHG

## 2022-02-10 DIAGNOSIS — K21.9 GASTROESOPHAGEAL REFLUX DISEASE, UNSPECIFIED WHETHER ESOPHAGITIS PRESENT: ICD-10-CM

## 2022-02-10 DIAGNOSIS — Z12.11 SCREENING FOR COLON CANCER: ICD-10-CM

## 2022-02-10 DIAGNOSIS — D64.9 ANEMIA, UNSPECIFIED TYPE: ICD-10-CM

## 2022-02-10 PROCEDURE — 99244 OFF/OP CNSLTJ NEW/EST MOD 40: CPT | Performed by: SURGERY

## 2022-02-10 PROCEDURE — 99202 OFFICE O/P NEW SF 15 MIN: CPT | Performed by: SURGERY

## 2022-02-10 ASSESSMENT — ENCOUNTER SYMPTOMS
SHORTNESS OF BREATH: 0
BLOOD IN STOOL: 0
VOMITING: 0
ANAL BLEEDING: 0
ABDOMINAL PAIN: 0
ABDOMINAL DISTENTION: 0
CONSTIPATION: 1
NAUSEA: 0
DIARRHEA: 0

## 2022-02-10 NOTE — H&P
James Avilez  2/10/2022  [address] [facility]              History and Physical      In person  present    Chief Complaint   Patient presents with    Consultation     New - Colonoscopy consult - ref by Dr López Body Colonoscopy     patient has never had a colonoscopy, no known family history of colon caner or polyps     Constipation     patient states she does have constipation, states her brothers and sisters also experience constipation          HISTORY OF PRESENT ILLNESS: James Avilez is a  48 y.o.  female,  who was sent to my office for a consult for evaluation for a  screening colonoscopy. denies a History of a Previous colonoscopy. No family history of colon cancer. Patient has no obvious evidence of GI bleeding,anorexia,  unexplained weight loss, dysphagia, odynophagia, persistent vomiting or a GI cancer in first-degree relative but she does have new onset anemia her last hemoglobin was 10.9 on 8-2-2021 this is gradually declined from 12. She denies any surgeries or acute blood loss at the time of her last blood draw. She was having some severe epigastric pain and reflux she was put on a trial of Protonix which has significantly helped her symptoms to where she is asymptomatic now but if she stops the medication for 4 days or more severe symptoms come back.       New Imaging Reviewed and personally interpreted CT scan from 2019 reviewed mild thickening of the bladder, gastric distention with a lot of food content, mild constipation  Labs reviewed from 2013 2021 hemoglobin went from 12-11 0.6-10.9  I reviewed the notes from from the primary care physician 11-9-2021 mention of being on Protonix for her reflux unable to stop it as her heartburn symptoms come back after 4 days off medication      Past Medical History:   Diagnosis Date    Breast pain, right 2/1/2016    Diabetes ([address]     Essential hypertension 8/26/2020    Hyperlipidemia       Past Surgical History: Procedure Laterality Date    EWELINA STEROTACTIC LOC BREAST BIOPSY LEFT Left 11/11/2021    EWELINA STEROTACTIC LOC BREAST BIOPSY LEFT 11/11/2021 [facility]      Patient has no known allergies. Current Outpatient Medications   Medication Sig Dispense Refill    atorvastatin (LIPITOR) 40 MG tablet TAKE 1 TABLET BY MOUTH ONE TIME A DAY 90 tablet 0    pantoprazole (PROTONIX) 20 MG tablet Take 1 tablet by mouth every morning (before breakfast) 90 tablet 1    metFORMIN (GLUCOPHAGE) 500 MG tablet Take 1 tablet by mouth 2 times daily (with meals) 60 tablet 3    losartan-hydroCHLOROthiazide (HYZAAR) 50-12.5 MG per tablet Take 1 tablet by mouth daily 90 tablet 1    Blood Pressure KIT Check blood pressure daily 1 kit 0    blood glucose test strips (AGAMATRIX AMP TEST) strip Pt to check BS daily. 100 each 3    AGAMATRIX ULTRA-THIN LANCETS MISC Check BS daily 100 each 3    Lancet Device MISC Pt to check BS daily 100 Device 3    Blood Glucose Monitoring Suppl (AGAMATRIX AMP) SREE 1 glucometer 1 Device 0     No current facility-administered medications for this visit. Social History     Tobacco Use    Smoking status: Never Smoker    Smokeless tobacco: Never Used   Substance Use Topics    Alcohol use: No      Family History   Problem Relation Age of Onset    Diabetes Mother     High Blood Pressure Mother     Diabetes Father        Review of Systems   Constitutional: Negative for chills, fever and unexpected weight change. HENT: Negative for congestion and hearing loss. Eyes: Negative for visual disturbance. Respiratory: Negative for shortness of breath. Cardiovascular: Negative for chest pain. Gastrointestinal: Positive for constipation. Negative for abdominal distention, abdominal pain, anal bleeding, blood in stool, diarrhea, nausea and vomiting. Genitourinary: Negative. Musculoskeletal: Negative. Skin: Negative. Neurological: Negative for seizures and syncope.          Physical Exam  HENT:      Head: Normocephalic. Mouth/Throat:      Mouth: Mucous membranes are moist.   Eyes:      Extraocular Movements: Extraocular movements intact. Pupils: Pupils are equal, round, and reactive to light. Cardiovascular:      Rate and Rhythm: Normal rate. Pulmonary:      Effort: Pulmonary effort is normal. No respiratory distress. Abdominal:      General: Abdomen is flat. There is no distension. Tenderness: There is no abdominal tenderness. There is no guarding or rebound. Musculoskeletal:         General: Normal range of motion. Cervical back: Normal range of motion and neck supple. Skin:     General: Skin is warm. Neurological:      General: No focal deficit present. Mental Status: She is alert and oriented to person, place, and time. Psychiatric:         Mood and Affect: Mood normal.         Behavior: Behavior normal.         Assessment:   Screening Colonoscopy   Diagnostic EGD     Plan:   Continue PPI  Diagnostic EGD      I have discussed the risks, benefits, and alternatives to esophagogastroduodenoscopy with possible biopsy with deep sedation with the patient. I have detailed the risks of deep sedation (hypotension, hypoxia) as well as complications of bleeding and perforation. The patient understands the above and agrees to proceed    Screening Colonoscopy    I discussed the options for colorectal cancer screening with the patient including options of fecal occult blood testing with flexible sigmoidoscopy or air contrast barium enema. Ialso discussed the risks and benefits of colonoscopy with possible biopsy/polypectomy/cauterization. I recommended colonoscopy with deep sedation. The patient understands the risks of bleeding and perforation (<1%). Theyunderstand that a perforation may not be recognized immediately and that it could entail a trip to the operating room to repair the injury. The patient understands the above and agrees to proceed.     Physician Signature: Charlee Parikh MD    Send copy of H&P to Austyn Vicente MD

## 2022-02-10 NOTE — PROGRESS NOTES
Eric Medel is scheduled for an EGD + Colonoscopy with Dr Marleen Neumann on 3/11/2022 @ 0900. Patient has been notified of the appointment and a letter has been given to the patient in clinic. Patient has been told to make sure they have a ride and to park in the [facility] and report through the outpatient entrance of the hospital facing [place] for same day surgery.     Electronically signed by Ramón Calvert on 2/10/22 at 1:42 PM EST

## 2022-02-10 NOTE — PATIENT INSTRUCTIONS
Colonoscopy     Definicin   Christianne colonoscopia es el examen visual del recto y del colon (intestino grueso). El examen se realiza con christianne herramienta llamada colonoscopio. Un colonoscopio es un tubo flexible con christianne cmara pequea en el extremo. Mai instrumento permite que el mdico ivviana el interior del recto y del colon. Colonoscopia       [address] para realizar el procedimiento   Se utiliza para examinar, diagnosticar y tratar problemas del intestino grueso. Mai procedimiento con frecuencia se realiza debido a las siguientes razones:   Para determinar la causa de dolor abdominal, hemorragia rectal o un cambio en los hbitos intestinales   Para detectar y tratar el cncer de colon o los plipos de colon   Para obtener muestras de tejido para analizarlas   Para detener christianne hemorragia intestinal   Controlar la respuesta al tratamiento si tiene enfermedad inflamatoria del intestino   Posibles complicaciones   Las complicaciones son poco frecuentes, carol ningn procedimiento est completamente cielo de riesgos. Si est planificando someterse a christianne colonoscopia, el mdico revisar christianne lista de posibles complicaciones, que pueden incluir:   Sangrado   Perforacin o pinchazo del intestino   Los factores que pueden aumentar el riesgo de complicaciones incluyen:   Condicin renal o cardaca preexistente   Tratamiento con determinados medicamentos, rey aspirina y otros frmacos con propiedades anticoagulantes o de dilucin de la martha   Ciruga abdominal o radioterapia previa   Colitis activa, diverticulitis u otra enfermedad intestinal aguda   Tratamiento previo con radioterapia   Asegrese de analizar estos riesgos con el mdico antes del procedimiento. Emperatriz Vermilion?    Antes del procedimiento   Probablemente, el mdico rolly lo siguiente:   Examen fsico   Antecedentes clnicos   Revisin de los medicamentos   Estudio de las heces para detectar martha oculta   El colon debe estar completamente limpio antes del procedimiento. Cualquier deposicin en el intestino obstaculizar la vista. Esta preparacin [address] antes del procedimiento. Siga las instrucciones de billings doctor, las cuales pueden incluir cualquiera de los siguientes mtodos de limpieza:   Enemas : se introduce lquido en el recto para estimular las evacuaciones   Laxantes: medicamentos que provocan christianne evacuacin blanda   Christianne dieta lquida   Medicamentos catrticos orales: christianne cantidad importante de lquido para beber, que estimula la evacuacin   Cmo prepararse para el procedimiento:   Hable con billings mdico acerca de pawan medicamentos. Se le puede solicitar que deje de citlalli algunos medicamentos aracely hasta christianne semana antes del procedimiento, tales rey:   Medicamentos antiinflamatorios (p. ej., aspirina )   Anticoagulantes, rey clopidogrel (Plavix) o warfarina (Coumadin)   Suplementos de chun o vitaminas que contienen chun   La noche anterior, coma christianne comida liviana. No ingiera ni mauro nada despus de la medianoche. Use ropa cmoda. Si tiene diabetes, consulte con el mdico si necesita ajustar la dosis de [place]. Consiga que alguien lo lleve a billings casa despus del procedimiento. Anestesia   El mdico puede sedarlo para disminuir el malestar. Descripcin del procedimiento   Deber recostarse sobre el lateral anuja, con las rodillas flexionadas y encogidas hacia el pecho. Se insertar lentamente el colonoscopio a travs del recto y BREST intestino. El colonoscopio inyectar aire en el colon. Christianne pequea videocmara adjunta permitir que el mdico visualice el revestimiento del colon en christianne pantalla. El mdico continuar guiando la herramienta a travs del intestino y evaluar el revestimiento. Se puede extraer Ileene Schaumann del tejido o los plipos aracely el procedimiento. Gabriele Montes? Menos de Taylor Sours   Doler? La [employer] de las personas informan cierto malestar se inserta el instrumento.  Puede sentir calambres, espasmos musculares o dolor abdominal inferior aracely el procedimiento. Tambin puede sentir la urgencia de defecar. Informe al mdico si siente algn dolor grave. Despus del procedimiento, los samantha por gases y los calambres son habituales. Estos samantha deben desaparecer cuando se expulsa el gas. Cuidados despus de la ciruga   Si se extrae tejido:   Se enviar al laboratorio para billings anlisis. Podra citlalli de 1 a 2 semanas Ash Freddie. Por lo general, el mdico vishal un informe inicial despus de quitar el instrumento. Se pueden recomendar otras pruebas. Puede ocurrir christianne leve hemorragia aracely los primeros esteves despus del procedimiento. Cuando regrese al hogar despus del procedimiento, asegrese de seguir las instrucciones del mdico, que pueden incluir:   Retome la administracin de los medicamentos rey lo indique el mdico.   Retome christianne dieta normal a menos que el mdico le indique lo contrario. El sedante lo rolly sentir mareado. Evite conducir, operar maquinaria o citlalli decisiones importantes aracely el marvel del da. Descanse aracely el marvel del da. Llame a billings mdico   Despus de llegar a casa, comunquese con billings mdico si presenta cualquiera de las siguientes situaciones:   Sangrado del recto: notifique al mdico si emite christianne cucharada de martha o ms. Heces negras y alquitranadas   Dolor abdominal intenso   Abdomen hinchado y sherry   Signos de infeccin, incluso fiebre o escalofros   Incapacidad para expulsar gases o defecar   Tos, falta de aire, dolor de pecho, nuseas o vmitos intensos   En tung de [place], llame al servicio de emergencias .      [facility]  Golytely o Nulytely  PREPARACIÓN DEL COLON PARA COLONOSCOPÍA O CIRUGÍA DE COLON    Es muy importante que siga todas las instrucciones que se indican en esta hoja con mucho cuidado (pueden ser algo diferentes que las instrucciones en el producto que compra en la farmacia) para asegurarse de que billings colon está adecuadamente limpio o el riesgo que corre de sufrir complicaciones podría aumentar.    2 días o más antes de la endoscopía:   Compre Golytely, Colyte o Nulytely, dependiendo de la receta médica que le rosario el cirujano, en la farmacia (llame a la farmacia con anticipación si va a pedir un producto con sabor para asegurarse de que lo tengan disponible).  Mezcle Golytely, Colyte o Nulytely según las instrucciones y ponga en el refrigerador.  No coma maíz, tomates, chícharos ni sandía 3 a 5 días antes del procedimiento.  Si está recibiendo INSULINA u OTROS MEDICAMENTOS, pregunte a billings médico de atención primaria cómo ajustar billings medicación mientras sigue christianne dieta de líquidos transparentes y cuando no puede comer nada. 1 día antes de la endoscopía: (11AM-5PM)   Ninguna comida sólida: solo líquidos transparentes (sopa, gelatina o jugos a través de los cuales pueda deandre, sin comidas sólidas) para el desayuno, almuerzo o gwen. NO tome ni coma nada daniels, ya que hará que el interior del colon quede daniels y parezca martha. No tome ni coma nada después de la medianoche.  Comience a citlalli la solución de Golytely, Colyte o Nulytely temprano en la tarde (entre la 1 pm y 4 pm - cuanto más temprano mejor). [place] un vaso de 8 onzas de esta solución cada 10 minutos hasta que haya tomado todo el galón. Es mejor citlalli todo el vaso rápidamente y no citlalli pequeños sorbos continuamente.  Los movimientos intestinales deberían ocurrir aproximadamente christianne hora después de citlalli el primer vaso de Golytely, Colyte o Nulytely. Continuarán periódicamente aracely aproximadamente 1 a 2 horas después de que termine de citlalli el último vaso. En kourtney momento las heces deberían ser Clementeen Crumb y transparentes.  La sensación de hinchazón y/o náuseas son comunes después de los primeros vasos debido al gran volumen de líquido ingerido. Es christianne sensación temporal, y mejorará cuando comiencen los movimientos intestinales.    Si tiene náuseas o vómitos, avise a billings médico.      Día de la endoscopía:   No tome ni coma nada después de la medianoche la noche antes de la endoscopía. Sin embargo, si está tomando algún medicamento para la presión sanguínea o medicamentos para el corazón, debe tomarlos con un sorbo de agua. Please contact Elli Liao MA at [phone] with any questions or concerns.

## 2022-02-10 NOTE — H&P (VIEW-ONLY)
Cookie Dill  2/10/2022  [address] [place] [address]              History and Physical      In person  present    Chief Complaint   Patient presents with    Consultation     New - Colonoscopy consult - ref by Dr Kimberli Hastings Colonoscopy     patient has never had a colonoscopy, no known family history of colon caner or polyps     Constipation     patient states she does have constipation, states her brothers and sisters also experience constipation          HISTORY OF PRESENT ILLNESS: Cookie Dill is a  48 y.o.  female,  who was sent to my office for a consult for evaluation for a  screening colonoscopy. denies a History of a Previous colonoscopy. No family history of colon cancer. Patient has no obvious evidence of GI bleeding,anorexia,  unexplained weight loss, dysphagia, odynophagia, persistent vomiting or a GI cancer in first-degree relative but she does have new onset anemia her last hemoglobin was 10.9 on 8-2-2021 this is gradually declined from 12. She denies any surgeries or acute blood loss at the time of her last blood draw. She was having some severe epigastric pain and reflux she was put on a trial of Protonix which has significantly helped her symptoms to where she is asymptomatic now but if she stops the medication for 4 days or more severe symptoms come back.       New Imaging Reviewed and personally interpreted CT scan from 2019 reviewed mild thickening of the bladder, gastric distention with a lot of food content, mild constipation  Labs reviewed from 2013 2021 hemoglobin went from 12-11 0.6-10.9  I reviewed the notes from from the primary care physician 11-9-2021 mention of being on Protonix for her reflux unable to stop it as her heartburn symptoms come back after 4 days off medication      Past Medical History:   Diagnosis Date    Breast pain, right 2/1/2016    Diabetes ([facility] [address]     Essential hypertension 8/26/2020    Hyperlipidemia       Past Surgical History: Procedure Laterality Date    EWELINA STEROTACTIC LOC BREAST BIOPSY LEFT Left 11/11/2021    EWELINA STEROTACTIC LOC BREAST BIOPSY LEFT 11/11/2021 [facility]      Patient has no known allergies. Current Outpatient Medications   Medication Sig Dispense Refill    atorvastatin (LIPITOR) 40 MG tablet TAKE 1 TABLET BY MOUTH ONE TIME A DAY 90 tablet 0    pantoprazole (PROTONIX) 20 MG tablet Take 1 tablet by mouth every morning (before breakfast) 90 tablet 1    metFORMIN (GLUCOPHAGE) 500 MG tablet Take 1 tablet by mouth 2 times daily (with meals) 60 tablet 3    losartan-hydroCHLOROthiazide (HYZAAR) 50-12.5 MG per tablet Take 1 tablet by mouth daily 90 tablet 1    Blood Pressure KIT Check blood pressure daily 1 kit 0    blood glucose test strips (AGAMATRIX AMP TEST) strip Pt to check BS daily. 100 each 3    AGAMATRIX ULTRA-THIN LANCETS MISC Check BS daily 100 each 3    Lancet Device MISC Pt to check BS daily 100 Device 3    Blood Glucose Monitoring Suppl (AGAMATRIX AMP) SREE 1 glucometer 1 Device 0     No current facility-administered medications for this visit. Social History     Tobacco Use    Smoking status: Never Smoker    Smokeless tobacco: Never Used   Substance Use Topics    Alcohol use: No      Family History   Problem Relation Age of Onset    Diabetes Mother     High Blood Pressure Mother     Diabetes Father        Review of Systems   Constitutional: Negative for chills, fever and unexpected weight change. HENT: Negative for congestion and hearing loss. Eyes: Negative for visual disturbance. Respiratory: Negative for shortness of breath. Cardiovascular: Negative for chest pain. Gastrointestinal: Positive for constipation. Negative for abdominal distention, abdominal pain, anal bleeding, blood in stool, diarrhea, nausea and vomiting. Genitourinary: Negative. Musculoskeletal: Negative. Skin: Negative. Neurological: Negative for seizures and syncope.          Physical Exam  HENT:      Head: Normocephalic. Mouth/Throat:      Mouth: Mucous membranes are moist.   Eyes:      Extraocular Movements: Extraocular movements intact. Pupils: Pupils are equal, round, and reactive to light. Cardiovascular:      Rate and Rhythm: Normal rate. Pulmonary:      Effort: Pulmonary effort is normal. No respiratory distress. Abdominal:      General: Abdomen is flat. There is no distension. Tenderness: There is no abdominal tenderness. There is no guarding or rebound. Musculoskeletal:         General: Normal range of motion. Cervical back: Normal range of motion and neck supple. Skin:     General: Skin is warm. Neurological:      General: No focal deficit present. Mental Status: She is alert and oriented to person, place, and time. Psychiatric:         Mood and Affect: Mood normal.         Behavior: Behavior normal.         Assessment:   Screening Colonoscopy   Diagnostic EGD     Plan:   Continue PPI  Diagnostic EGD      I have discussed the risks, benefits, and alternatives to esophagogastroduodenoscopy with possible biopsy with deep sedation with the patient. I have detailed the risks of deep sedation (hypotension, hypoxia) as well as complications of bleeding and perforation. The patient understands the above and agrees to proceed    Screening Colonoscopy    I discussed the options for colorectal cancer screening with the patient including options of fecal occult blood testing with flexible sigmoidoscopy or air contrast barium enema. Ialso discussed the risks and benefits of colonoscopy with possible biopsy/polypectomy/cauterization. I recommended colonoscopy with deep sedation. The patient understands the risks of bleeding and perforation (<1%). Theyunderstand that a perforation may not be recognized immediately and that it could entail a trip to the operating room to repair the injury. The patient understands the above and agrees to proceed.     Physician Signature: Abe Madison MD    Send copy of H&P to Fidelia Bhatia MD

## 2022-02-11 ENCOUNTER — TELEPHONE (OUTPATIENT)
Dept: SURGERY | Age: 47
End: 2022-02-11

## 2022-02-11 NOTE — TELEPHONE ENCOUNTER
Prior Authorization Form:      DEMOGRAPHICS:                     Patient Name:  Severo Martin  Patient :  1975            Insurance:  Payor: / No coverage found. Insurance ID Number:    Payor/Plan Subscr  Sex Relation Sub.  Ins. ID Effective Group Num         DIAGNOSIS & PROCEDURE:                       Procedure/Operation: EGD + Colonosocpy           CPT Code: 48808 , [id number]    Diagnosis:  Anemia, GERD    ICD10 Code: D64.9 , K21.9    Location:  [facility]    Surgeon:  Dr Bailee Crews INFORMATION:                          Date: 3/11/22     Time: 0900              Anesthesia:  [facility]                                                       Status:  Outpatient        Special Comments:  NA       Electronically signed by Cesario Bird on 2022 at 9:20 AM

## 2022-02-21 ENCOUNTER — OFFICE VISIT (OUTPATIENT)
Dept: INTERNAL MEDICINE | Age: 47
End: 2022-02-21

## 2022-02-21 VITALS
BODY MASS INDEX: 27.56 KG/M2 | TEMPERATURE: 97.2 F | DIASTOLIC BLOOD PRESSURE: 81 MMHG | OXYGEN SATURATION: 98 % | HEART RATE: 93 BPM | RESPIRATION RATE: 16 BRPM | SYSTOLIC BLOOD PRESSURE: 127 MMHG | HEIGHT: 61 IN | WEIGHT: 146 LBS

## 2022-02-21 DIAGNOSIS — I10 PRIMARY HYPERTENSION: ICD-10-CM

## 2022-02-21 DIAGNOSIS — E11.9 TYPE 2 DIABETES MELLITUS WITHOUT COMPLICATION, WITHOUT LONG-TERM CURRENT USE OF INSULIN (HCC): ICD-10-CM

## 2022-02-21 LAB — HBA1C MFR BLD: 7.5 %

## 2022-02-21 PROCEDURE — 3051F HG A1C>EQUAL 7.0%<8.0%: CPT | Performed by: INTERNAL MEDICINE

## 2022-02-21 PROCEDURE — 83036 HEMOGLOBIN GLYCOSYLATED A1C: CPT | Performed by: INTERNAL MEDICINE

## 2022-02-21 PROCEDURE — 99213 OFFICE O/P EST LOW 20 MIN: CPT | Performed by: INTERNAL MEDICINE

## 2022-02-21 RX ORDER — ATORVASTATIN CALCIUM 40 MG/1
TABLET, FILM COATED ORAL
Qty: 90 TABLET | Refills: 1 | Status: SHIPPED
Start: 2022-02-21 | End: 2022-06-16 | Stop reason: SDUPTHER

## 2022-02-21 RX ORDER — LOSARTAN POTASSIUM AND HYDROCHLOROTHIAZIDE 12.5; 5 MG/1; MG/1
1 TABLET ORAL DAILY
Qty: 90 TABLET | Refills: 1 | Status: SHIPPED
Start: 2022-02-21 | End: 2022-06-16 | Stop reason: SDUPTHER

## 2022-02-21 NOTE — PATIENT INSTRUCTIONS
Discharge Instructions:   Be compliant with medications   Please have labs done before coming to next visit   Follow up in , or sooner if symptoms get worse or do not resolve      Patient Education        DASH Diet: Care Instructions  Your Care Instructions     The DASH diet is an eating plan that can help lower your blood pressure. DASH stands for Dietary Approaches to Stop Hypertension. Hypertension is high blood pressure. The DASH diet focuses on eating foods that are high in calcium, potassium, and magnesium. These nutrients can lower blood pressure. The foods that are highest in these nutrients are fruits, vegetables, low-fat dairy products, nuts, seeds, and legumes. But taking calcium, potassium, and magnesium supplements instead of eating foods that are high in those nutrients does not have the same effect. The DASH diet also includes whole grains, fish, and poultry. The DASH diet is one of several lifestyle changes your doctor may recommend to lower your high blood pressure. Your doctor may also want you to decrease the amount of sodium in your diet. Lowering sodium while following the DASH diet can lower blood pressure even further than just the DASH diet alone. Follow-up care is a key part of your treatment and safety. Be sure to make and go to all appointments, and call your doctor if you are having problems. It's also a good idea to know your test results and keep a list of the medicines you take. How can you care for yourself at home? Following the DASH diet  · Eat 4 to 5 servings of fruit each day. A serving is 1 medium-sized piece of fruit, ½ cup chopped or canned fruit, 1/4 cup dried fruit, or 4 ounces (½ cup) of fruit juice. Choose fruit more often than fruit juice. · Eat 4 to 5 servings of vegetables each day. A serving is 1 cup of lettuce or raw leafy vegetables, ½ cup of chopped or cooked vegetables, or 4 ounces (½ cup) of vegetable juice.  Choose vegetables more often than vegetable juice.  · Get 2 to 3 servings of low-fat and fat-free dairy each day. A serving is 8 ounces of milk, 1 cup of yogurt, or 1 ½ ounces of cheese. · Eat 6 to 8 servings of grains each day. A serving is 1 slice of bread, 1 ounce of dry cereal, or ½ cup of cooked rice, pasta, or cooked cereal. Try to choose whole-grain products as much as possible. · Limit lean meat, poultry, and fish to 2 servings each day. A serving is 3 ounces, about the size of a deck of cards. · Eat 4 to 5 servings of nuts, seeds, and legumes (cooked dried beans, lentils, and split peas) each week. A serving is 1/3 cup of nuts, 2 tablespoons of seeds, or ½ cup of cooked beans or peas. · Limit fats and oils to 2 to 3 servings each day. A serving is 1 teaspoon of vegetable oil or 2 tablespoons of salad dressing. · Limit sweets and added sugars to 5 servings or less a week. A serving is 1 tablespoon jelly or jam, ½ cup sorbet, or 1 cup of lemonade. · Eat less than 2,300 milligrams (mg) of sodium a day. If you limit your sodium to 1,500 mg a day, you can lower your blood pressure even more. · Be aware that all of these are the suggested number of servings for people who eat 1,800 to 2,000 calories a day. Your recommended number of servings may be different if you need more or fewer calories. Tips for success  · Start small. Do not try to make dramatic changes to your diet all at once. You might feel that you are missing out on your favorite foods and then be more likely to not follow the plan. Make small changes, and stick with them. Once those changes become habit, add a few more changes. · Try some of the following:  ? Make it a goal to eat a fruit or vegetable at every meal and at snacks. This will make it easy to get the recommended amount of fruits and vegetables each day. ? Try yogurt topped with fruit and nuts for a snack or healthy dessert. ? Add lettuce, tomato, cucumber, and onion to sandwiches.   ? Combine a ready-made pizza crust with low-fat mozzarella cheese and lots of vegetable toppings. Try using tomatoes, squash, spinach, broccoli, carrots, cauliflower, and onions. ? Have a variety of cut-up vegetables with a low-fat dip as an appetizer instead of chips and dip. ? Sprinkle sunflower seeds or chopped almonds over salads. Or try adding chopped walnuts or almonds to cooked vegetables. ? Try some vegetarian meals using beans and peas. Add garbanzo or kidney beans to salads. Make burritos and tacos with mashed wheeler beans or black beans. Where can you learn more? Go to https://9sky.compeLighteraeb.Poptent. org and sign in to your iexerci.se account. Enter R736 in the KnowledgeVision box to learn more about \"DASH Diet: Care Instructions. \"     If you do not have an account, please click on the \"Sign Up Now\" link. Current as of: April 29, 2021               Content Version: 13.1  © 3289-6802 [employer]. Care instructions adapted under license by [facility] ([facility]). If you have questions about a medical condition or this instruction, always ask your healthcare professional. [address] any warranty or liability for your use of this information. Patient Education        Home Blood Sugar Test: About This Test  What is it? A home blood sugar test measures the amount of sugar (glucose) in your blood, using a small device called a blood sugar meter. It's a quick way to test your blood sugar anywhere, at any time. Why is this test done? Testing your blood sugar helps you know if your levels are in your target range. It helps you know when to take action and may help you avoid blood sugar emergencies. Testing also helps you learn how things like exercise, stress, and what you eat can affect your blood sugar. What happens before the test?  The supplies you will need for testing blood sugar include:  · A blood glucose meter. · Testing strips.  These are made to be used with a specific model of meter. Make sure the strips haven't . · Sugar control solutions. Some meters require a specific solution. Many new meters are made to operate without a control solution. · Short needles called lancets for pricking your skin. · A pen-sized ellis for the lancet (lancet device). It positions the lancet and controls how deeply it goes into your skin. · Clean cotton balls. These are used to stop the bleeding from the testing site. What happens during the test?  Checking your blood sugar involves pricking your finger, palm, or forearm with a lancet to collect a drop of blood. The blood drop is placed on a test strip, which you insert into the blood glucose meter. The instructions for testing are slightly different for each blood glucose meter model. Follow the instructions that came with your meter. · Wash your hands with warm, soapy water. Dry them well with a clean towel. You may also use an alcohol wipe to clean your finger or other site. But make sure your hands are dry before the test.  · Insert a clean lancet into the lancet device. · Remove a test strip from the test strip bottle. Replace the lid right away to keep moisture away from the other strips. · Follow the instructions that came with your meter to get it ready. · Use the lancet device to stick the side of your fingertip with the lancet. Do not stick the tip of your finger. Some blood sugar meters use lancet devices that take the blood sample from other sites, such as the palm of the hand or the forearm. But the finger is usually the most accurate place to test blood sugar. · Put a drop of blood on the correct spot on the test strip. · Apply pressure with a clean cotton ball to stop the bleeding. · Follow the directions that came with the meter to get the results. · Write down the results and the time that you tested your blood. Some meters will store the results for you. How long does the test take?   The blood glucose meter will show professional can also help you reach a healthy weight if that is one of your goals. What plan is right for you? Your dietitian or diabetes educator may suggest that you start with the plate format or carbohydrate counting. The plate format  The plate format is a simple way to help you manage how you eat. You plan meals by learning how much space each food should take on a plate. Using the plate format helps you spread carbohydrate throughout the day. It can make it easier to keep your blood sugar level within your target range. It also helps you see if you're eating healthy portion sizes. To use the plate format, you put non-starchy vegetables on half your plate. Add meat or meat substitutes on one-quarter of the plate. Put a grain or starchy vegetable (such as brown rice or a potato) on the final quarter of the plate. You can add a small piece of fruit and some low-fat or fat-free milk or yogurt, depending on your carbohydrate goal for each meal.  Here are some tips for using the plate format:  · Make sure that you are not using an oversized plate. A 9-inch plate is best. Many restaurants use larger plates. · Get used to using the plate format at home. Then you can use it when you eat out. · Write down your questions about using the plate format. Talk to your doctor, a dietitian, or a diabetes educator about your concerns. Carbohydrate counting  With carbohydrate counting, you plan meals based on the amount of carbohydrate in each food. Carbohydrate raises blood sugar higher and more quickly than any other nutrient. It is found in desserts, breads and cereals, and fruit. It's also found in starchy vegetables such as potatoes and corn, grains such as rice and pasta, and milk and yogurt. Spreading carbohydrate throughout the day helps keep your blood sugar levels within your target range.   Your daily amount depends on several things, including your weight, how active you are, which diabetes medicines you take, and what your goals are for your blood sugar levels. A registered dietitian or diabetes educator can help you plan how much carbohydrate to include in each meal and snack. A guideline for your daily amount of carbohydrate is:  · 45 to 60 grams at each meal. That's about the same as 3 to 4 carbohydrate servings. · 15 to 20 grams at each snack. That's about the same as 1 carbohydrate serving. The Nutrition Facts label on packaged foods tells you how much carbohydrate is in a serving of the food. First, look at the serving size on the food label. Is that the amount you eat in a serving? All of the nutrition information on a food label is based on that serving size. So if you eat more or less than that, you'll need to adjust the other numbers. Total carbohydrate is the next thing you need to look for on the label. If you count carbohydrate servings, one serving of carbohydrate is 15 grams. For foods that don't come with labels, such as fresh fruits and vegetables, you'll need a guide that lists carbohydrate in these foods. Ask your doctor, dietitian, or diabetes educator about books or other nutrition guides you can use. If you take insulin, you need to know how many grams of carbohydrate are in a meal. This lets you know how much rapid-acting insulin to take before you eat. If you use an insulin pump, you get a constant rate of insulin during the day. So the pump must be programmed at meals to give you extra insulin to cover the rise in blood sugar after meals. When you know how much carbohydrate you will eat, you can take the right amount of insulin. Or, if you always use the same amount of insulin, you need to make sure that you eat the same amount of carbohydrate at meals. If you need more help to understand carbohydrate counting and food labels, ask your doctor, dietitian, or diabetes educator. How can you plan healthy meals? Here are some tips to get started:  · Plan your meals a week at a time.  Don't forget to include snacks too. · Use cookbooks or online recipes to plan several main meals. Plan some quick meals for busy nights. You also can double some recipes that freeze well. Then you can save half for other busy nights when you don't have time to cook. · Make sure you have the ingredients you need for your recipes. If you're running low on basic items, put these items on your shopping list too. · List foods that you use to make breakfasts, lunches, and snacks. List plenty of fruits and vegetables. · Post this list on the refrigerator. Add to it as you think of more things you need. · Take the list to the store to do your weekly shopping. Follow-up care is a key part of your treatment and safety. Be sure to make and go to all appointments, and call your doctor if you are having problems. It's also a good idea to know your test results and keep a list of the medicines you take. Where can you learn more? Go to https://MindCare SolutionspeLBE Security Master.crobo. org and sign in to your Britely account. Enter P680 in the [facility] box to learn more about \"Learning About Meal Planning for Diabetes. \"     If you do not have an account, please click on the \"Sign Up Now\" link. Current as of: September 8, 2021               Content Version: 13.1  © 5482-8518 [employer]. Care instructions adapted under license by [facility] ([facility]). If you have questions about a medical condition or this instruction, always ask your healthcare professional. [place] [address] any warranty or liability for your use of this information.

## 2022-02-21 NOTE — PROGRESS NOTES
Pt information obtained with assistance from [employer], [address] [place] [address]. Discharge instructions & AVS given to patient per Dr. Medardo Zambrano.

## 2022-02-21 NOTE — PROGRESS NOTES
[facility]  Internal Medicine Residency Clinic    Attending Physician Statement  I have discussed the case, including pertinent history and exam findings with the resident physician. I agree with the assessment, plan and orders as documented by the resident. I have reviewed all pertinent PMHx, PSHx, FamHx, SocialHx, medications, and allergies and updated history as appropriate. Patient presents for routine follow up of medical problems. HTN - controlled on current medications   Needs BMP as previously ordered. Continue losartan/HCTZ    Type 2 DM - Last HbA1c was 6.5. Needs testing supplies    Hyperlipidemia - tolerating statin     Continue same and recheck LFTs in August.     GERD - occasional symptoms - controlled on PPI   Continue same. L breast lesion - asymmetric lesion - biopsy with benign tissue in November. Some tenderness at biopsy site. Conservative treatment. Continue yearly surveillance. Scheduled for EGD and colonoscopy on 3/11/2022. Remainder of medical problems as per resident note.     Imani Oviedo MD  2/21/2022 11:03 AM

## 2022-02-21 NOTE — PROGRESS NOTES
[facility]  Internal Medicine Residency Program  [facility] Note      SUBJECTIVE:  CC: had concerns including Diabetes (BS today 120. Highes BS  125 Lowest ), Hypertension, and Medication Refill. : Delmon Seip, [address] [facility]  [address] presented to the [facility] for a routine visit  She stated that she is feeling well today. She reports intermittent pain at the biopsy site of left breast. She denies any discharge, redness, tenderness of the area    Systolic BP at home ranging from 123-125 mmHg    Blood sugar at home ranging from  mg/dl    Last seen by OB/GYN on 2/22- recommended pelvic US for abdominal pain and pap smear    Seen by general surgery on 2/22- recommended screening colonoscopy and EGD in 3/22 and to continue PPI    Review Of Systems:  General: no fevers, chills, weight loss or gain.    Ears/Nose/Throat: no hearing loss, tinnitus, vertigo, nosebleed, nasal congestion, rhinorrhea, sore throat  Respiratory: no cough, pleuritic chest pain, dyspnea, or wheezing  Cardiovascular: no chest pain, angina, dyspnea on exertion, orthopnea, PND, palpitations, or claudication  Gastrointestinal: no nausea, vomiting, heartburn, diarrhea, constipation, abdominal pain, hematochezia or melena  Genitourinary: no urinary urgency, frequency, dysuria, nocturia, hesitancy, or incontinence  Musculoskeletal: no arthritis, arthralgia, myalgia, weakness, or morning stiffness  Skin: no abnormal pigmentation, rash, itching, masses, hair or nail changes    Outpatient Medications Marked as Taking for the 2/21/22 encounter (Office Visit) with Kayley Dwyer MD   Medication Sig Dispense Refill    atorvastatin (LIPITOR) 40 MG tablet TAKE 1 TABLET BY MOUTH ONE TIME A DAY 90 tablet 0    pantoprazole (PROTONIX) 20 MG tablet Take 1 tablet by mouth every morning (before breakfast) 90 tablet 1    metFORMIN (GLUCOPHAGE) 500 MG tablet Take 1 tablet by mouth 2 times daily DM  Uncontrolled  HbA1c at goal- repeat today: 6.6%--> 6.5% (8/2020)--> 7.3% (6/21)--> 6.5%--> 7.5% (2/22)  Continue Metformin 500 mg twice daily  Urine microalbumin/creatinine ratio wnl  Last BUN/Cr: 12/0.5 (8/21)  Last LDL 60 (7/21)  Diabetic Foot Exam 10/10, normal (8/2020)  Continue with lifestyle modifications   Seen by ophthalmology in 10/21  Will check BMP  Next HbA1C check in 6/2022      Fatigue  Improving  TSH, FT4 ordered     Neck pain   Stable  CT cervical spine revealing 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 9)  Advised on life style modification and weight loss     Pap smear for cervical cancer screening  Follows with OB/GYN  Pap smear unremarkable (2/22)    Health Care Maintenance   · DM screening if over weight or obese 38-68 years old; HbA1C 7.5%, diabetic  · Screening mammogram every 2 years- Screening mammogram remarkable for focal asymetry of left breast; biopsy revealing microcalcification, benign fatty tissue; next mammogram in 10/22  · PAP smear every 3 years-unremarkable (2/22)  · Colon cancer screening- ordered  · One-time screening for HCV infection to adults born between 1945 and 1965-nonreactive  · Annual lung cancer 54 to [de-identified] years who have a 30 pack-year smoking history and currently smoke or have quit within the past 15 years- not indicated  · ETOH misuse - N/A  · ASA for primary prevention of CVD and CRC with > 10% 10 year risk 50-61yo:N/A  · Diabetic foot exam (8/26/2020)  · Diabetic eye exam: 2 years ago; Seen by ophthalmology in 10/21; no retinopathy     I have reviewed my findings and recommendations with Lori Coburn and Dr Abhijit Rosado MD PGY-3  2/21/2022 11:09 AM

## 2022-02-23 ENCOUNTER — HOSPITAL ENCOUNTER (OUTPATIENT)
Age: 47
Discharge: HOME | End: 2022-02-23

## 2022-02-23 DIAGNOSIS — E11.9 TYPE 2 DIABETES MELLITUS WITHOUT COMPLICATION, WITHOUT LONG-TERM CURRENT USE OF INSULIN (HCC): ICD-10-CM

## 2022-02-23 DIAGNOSIS — I10 ESSENTIAL HYPERTENSION: ICD-10-CM

## 2022-02-23 DIAGNOSIS — R53.83 TIREDNESS: ICD-10-CM

## 2022-02-23 LAB
ANION GAP SERPL CALCULATED.3IONS-SCNC: 20 MMOL/L (ref 7–16)
BUN BLDV-MCNC: 12 MG/DL (ref 6–20)
CALCIUM SERPL-MCNC: 10 MG/DL (ref 8.6–10.2)
CHLORIDE BLD-SCNC: 99 MMOL/L (ref 98–107)
CO2: 20 MMOL/L (ref 22–29)
CREAT SERPL-MCNC: 0.5 MG/DL (ref 0.5–1)
CREATININE URINE: 29 MG/DL (ref 29–226)
GFR AFRICAN AMERICAN: >60
GFR NON-AFRICAN AMERICAN: >60 ML/MIN/1.73
GLUCOSE BLD-MCNC: 166 MG/DL (ref 74–99)
MICROALBUMIN UR-MCNC: <12 MG/L
MICROALBUMIN/CREAT UR-RTO: ABNORMAL (ref 0–30)
POTASSIUM SERPL-SCNC: 3.7 MMOL/L (ref 3.5–5)
SODIUM BLD-SCNC: 139 MMOL/L (ref 132–146)
T4 FREE: 1.04 NG/DL (ref 0.93–1.7)
TSH SERPL DL<=0.05 MIU/L-ACNC: 1.75 UIU/ML (ref 0.27–4.2)

## 2022-02-23 PROCEDURE — 82570 ASSAY OF URINE CREATININE: CPT

## 2022-02-23 PROCEDURE — 84443 ASSAY THYROID STIM HORMONE: CPT

## 2022-02-23 PROCEDURE — 36415 COLL VENOUS BLD VENIPUNCTURE: CPT

## 2022-02-23 PROCEDURE — 82044 UR ALBUMIN SEMIQUANTITATIVE: CPT

## 2022-02-23 PROCEDURE — 80048 BASIC METABOLIC PNL TOTAL CA: CPT

## 2022-02-23 PROCEDURE — 84439 ASSAY OF FREE THYROXINE: CPT

## 2022-03-08 NOTE — PROGRESS NOTES
[address] PRE-ADMISSION TESTING GENERAL INSTRUCTIONS- [facility]-phone number:[phone]    GENERAL INSTRUCTIONS  [x] Antibacterial Soap shower Night before and/or AM of Surgery  [x] Nothing by mouth after midnight, including gum, candy, mints, or water. [x] You may brush your teeth, gargle, but do NOT swallow water. [x]No smoking, chewing tobacco, illegal drugs, marijuana or alcohol within 24 hours of your surgery. [x] Jewelry, valuables or body piercing's should not be brought to the hospital. All body and/or tongue piercing's must be removed prior to arriving to hospital.  ALL hair pins must be removed. [x] Do not wear makeup, lotions, powders, deodorant. Nail polish as directed by the nurse. [x] Arrange transportation with a responsible adult  to and from the hospital. If you do not have a responsible adult  to transport you, you will need to make arrangements with a medical transportation company (i.e. [employer]. A Uber/taxi/bus is not appropriate unless you are accompanied by a responsible adult ). Arrange for someone to be with you for the remainder of the day and for 24 hours after your procedure due to having had anesthesia. Who will be your  for transportation? Ravi Citrin, spouse  Who will be staying with you for 24 hrs after your procedure? Ravifausto Sánchezin, spouse  [x] [employer] card and photo ID. [x] Bring urine specimen day of surgery. Any small container is acceptable. Follow bowel prep instructions per surgeon. No liquids/jello that are red or purple color. PARKING INSTRUCTIONS:   [x] Arrival Time: 8 am,    Two people may accompany you. Everyone will need to wear a mask. · [x] Parking lot '\"I\"  is located on [facility] (the corner of [facility]). To enter, press the button and the gate will lift. A free token will be provided to exit the lot.    One car per patient is allowed to park in this lot. All other cars are to park on [address] [facility] either in the parking garage or the handicap lot. Walk up the front walk to the [facility], the door will be locked an employee will greet you and let you in. EDUCATION INSTRUCTIONS:         [x]Pain: Post-op pain is normal and to be expected. You will be asked to rate your pain from 0-10 (a zero is not acceptable-education is needed). Your post-op pain goal is:   [x] Ask your nurse for your pain medication. MEDICATION INSTRUCTIONS:  [x]Bring a complete list of your medications, please write the last time you took the medicine, give this list to the nurse. [x] Take the following medications the morning of surgery with 1-2 ounces of water: pantoprazole     [x] Stop herbal supplements, ibuprofen (Nsaids)  and vitamins 5 days before your surgery. [x] DO NOT take any diabetic medicine the morning of surgery. Follow instructions for insulin the day before surgery. Not currently prescribed insulin  [x] If you are diabetic and your blood sugar is low or you feel symptomatic, you may drink 1-2 ounces of apple juice or take a glucose tablet. The morning of your procedure, you may call the pre-op area if you have concerns about your blood sugar [phone]. [x] Follow physician instructions regarding any blood thinners you may be taking. WHAT TO EXPECT:  [x] The day of surgery you will be greeted and checked in by the [employer]. Please bring your photo ID and insurance card. A nurse will greet you in accordance to the time you are needed in the pre-op area to prepare you for surgery. Please do not be discouraged if you are not greeted in the order you arrive as there are many variables that are involved in patient preparation. Your patience is greatly appreciated as you wait for your nurse. Please bring in items such as: books, magazines, newspapers, electronics, or any other items  to occupy your time in the waiting area. [x]  Delays may occur with surgery and staff will make a sincere effort to keep you informed of delays. If any delays occur with your procedure, we apologize ahead of time for your inconvenience as we recognize the value of your time.

## 2022-03-10 ENCOUNTER — TELEPHONE (OUTPATIENT)
Dept: INTERNAL MEDICINE | Age: 47
End: 2022-03-10

## 2022-03-10 NOTE — TELEPHONE ENCOUNTER
Late Entry:    LSW spoke with pt by phone thru interpretation of Wen Alcaraz. Pt resides with significant other ; has adult children. Has lived in [facility] for past 8 years; and previously resided in [place]; pt born in [facility] ; has lived in [address] [place] [address] for 31 years and has green card and zero income; advised will obtain recommendation from public benefits dept to determine capacity to apply for Medicaid.     [phone] was advised pt could apply for Medicaid benefits ; message left utilizing  #[phone] for pt to call LSW via  , I Collette Deist

## 2022-03-11 ENCOUNTER — HOSPITAL ENCOUNTER (OUTPATIENT)
Age: 47
Setting detail: OUTPATIENT SURGERY
Discharge: HOME | End: 2022-03-11
Attending: SURGERY | Admitting: SURGERY

## 2022-03-11 ENCOUNTER — ANESTHESIA EVENT (OUTPATIENT)
Dept: ENDOSCOPY | Age: 47
End: 2022-03-11

## 2022-03-11 ENCOUNTER — ANESTHESIA (OUTPATIENT)
Dept: ENDOSCOPY | Age: 47
End: 2022-03-11

## 2022-03-11 VITALS
HEART RATE: 66 BPM | SYSTOLIC BLOOD PRESSURE: 124 MMHG | RESPIRATION RATE: 16 BRPM | HEIGHT: 61 IN | OXYGEN SATURATION: 99 % | WEIGHT: 143 LBS | TEMPERATURE: 98 F | DIASTOLIC BLOOD PRESSURE: 60 MMHG | BODY MASS INDEX: 27 KG/M2

## 2022-03-11 VITALS
SYSTOLIC BLOOD PRESSURE: 113 MMHG | RESPIRATION RATE: 18 BRPM | DIASTOLIC BLOOD PRESSURE: 59 MMHG | OXYGEN SATURATION: 98 %

## 2022-03-11 DIAGNOSIS — Z01.812 PRE-OPERATIVE LABORATORY EXAMINATION: Primary | ICD-10-CM

## 2022-03-11 LAB
CHP ED QC CHECK: NORMAL
GLUCOSE BLD-MCNC: 131 MG/DL
HCG, URINE, POC: NEGATIVE
Lab: NORMAL
METER GLUCOSE: 131 MG/DL (ref 74–99)
NEGATIVE QC PASS/FAIL: NORMAL
POSITIVE QC PASS/FAIL: NORMAL

## 2022-03-11 PROCEDURE — 88342 IMHCHEM/IMCYTCHM 1ST ANTB: CPT

## 2022-03-11 PROCEDURE — 45378 DIAGNOSTIC COLONOSCOPY: CPT | Performed by: SURGERY

## 2022-03-11 PROCEDURE — 3609027000 HC COLONOSCOPY: Performed by: SURGERY

## 2022-03-11 PROCEDURE — 2580000003 HC RX 258: Performed by: SURGERY

## 2022-03-11 PROCEDURE — 82962 GLUCOSE BLOOD TEST: CPT

## 2022-03-11 PROCEDURE — 2580000003 HC RX 258: Performed by: NURSE ANESTHETIST, CERTIFIED REGISTERED

## 2022-03-11 PROCEDURE — 3609012400 HC EGD TRANSORAL BIOPSY SINGLE/MULTIPLE: Performed by: SURGERY

## 2022-03-11 PROCEDURE — 2709999900 HC NON-CHARGEABLE SUPPLY: Performed by: SURGERY

## 2022-03-11 PROCEDURE — 3700000000 HC ANESTHESIA ATTENDED CARE: Performed by: SURGERY

## 2022-03-11 PROCEDURE — 7100000011 HC PHASE II RECOVERY - ADDTL 15 MIN: Performed by: SURGERY

## 2022-03-11 PROCEDURE — 43239 EGD BIOPSY SINGLE/MULTIPLE: CPT | Performed by: SURGERY

## 2022-03-11 PROCEDURE — 7100000010 HC PHASE II RECOVERY - FIRST 15 MIN: Performed by: SURGERY

## 2022-03-11 PROCEDURE — 6360000002 HC RX W HCPCS: Performed by: NURSE ANESTHETIST, CERTIFIED REGISTERED

## 2022-03-11 PROCEDURE — 88305 TISSUE EXAM BY PATHOLOGIST: CPT

## 2022-03-11 PROCEDURE — 3700000001 HC ADD 15 MINUTES (ANESTHESIA): Performed by: SURGERY

## 2022-03-11 RX ORDER — SODIUM CHLORIDE 9 MG/ML
25 INJECTION, SOLUTION INTRAVENOUS PRN
Status: DISCONTINUED | OUTPATIENT
Start: 2022-03-11 | End: 2022-03-11 | Stop reason: HOSPADM

## 2022-03-11 RX ORDER — SODIUM CHLORIDE 9 MG/ML
INJECTION, SOLUTION INTRAVENOUS CONTINUOUS
Status: DISCONTINUED | OUTPATIENT
Start: 2022-03-11 | End: 2022-03-11 | Stop reason: HOSPADM

## 2022-03-11 RX ORDER — LIDOCAINE HYDROCHLORIDE 20 MG/ML
INJECTION, SOLUTION INTRAVENOUS PRN
Status: DISCONTINUED | OUTPATIENT
Start: 2022-03-11 | End: 2022-03-11 | Stop reason: SDUPTHER

## 2022-03-11 RX ORDER — PROPOFOL 10 MG/ML
INJECTION, EMULSION INTRAVENOUS CONTINUOUS PRN
Status: DISCONTINUED | OUTPATIENT
Start: 2022-03-11 | End: 2022-03-11 | Stop reason: SDUPTHER

## 2022-03-11 RX ORDER — SODIUM CHLORIDE 0.9 % (FLUSH) 0.9 %
10 SYRINGE (ML) INJECTION PRN
Status: DISCONTINUED | OUTPATIENT
Start: 2022-03-11 | End: 2022-03-11 | Stop reason: HOSPADM

## 2022-03-11 RX ORDER — SODIUM CHLORIDE 9 MG/ML
INJECTION, SOLUTION INTRAVENOUS CONTINUOUS PRN
Status: DISCONTINUED | OUTPATIENT
Start: 2022-03-11 | End: 2022-03-11 | Stop reason: SDUPTHER

## 2022-03-11 RX ORDER — SODIUM CHLORIDE 0.9 % (FLUSH) 0.9 %
10 SYRINGE (ML) INJECTION EVERY 12 HOURS SCHEDULED
Status: DISCONTINUED | OUTPATIENT
Start: 2022-03-11 | End: 2022-03-11 | Stop reason: HOSPADM

## 2022-03-11 RX ADMIN — LIDOCAINE HYDROCHLORIDE 60 MG: 20 INJECTION, SOLUTION INTRAVENOUS at 10:25

## 2022-03-11 RX ADMIN — SODIUM CHLORIDE: 0.9 INJECTION, SOLUTION INTRAVENOUS at 10:18

## 2022-03-11 RX ADMIN — SODIUM CHLORIDE: 9 INJECTION, SOLUTION INTRAVENOUS at 08:31

## 2022-03-11 RX ADMIN — PROPOFOL 238.83 MCG/KG/MIN: 10 INJECTION, EMULSION INTRAVENOUS at 10:25

## 2022-03-11 ASSESSMENT — PAIN SCALES - GENERAL
PAINLEVEL_OUTOF10: 0

## 2022-03-11 ASSESSMENT — PAIN - FUNCTIONAL ASSESSMENT: PAIN_FUNCTIONAL_ASSESSMENT: 0-10

## 2022-03-11 NOTE — ANESTHESIA PRE PROCEDURE
Department of Anesthesiology  Preprocedure Note       Name:  Jorge Fajardo   Age:  48 y.o.  :  1975                                          MRN:  [id number]         Date:  3/11/2022      Surgeon: Kris Brito):  Josie Myles MD    Procedure: Procedure(s):  EGD DIAGNOSTIC ONLY  COLORECTAL CANCER SCREENING, NOT HIGH RISK    Medications prior to admission:   Prior to Admission medications    Medication Sig Start Date End Date Taking? Authorizing Provider   atorvastatin (LIPITOR) 40 MG tablet TAKE 1 TABLET BY MOUTH ONE TIME A DAY 22  Yes Sharron Gudino MD   metFORMIN (GLUCOPHAGE) 500 MG tablet Take 1 tablet by mouth 2 times daily (with meals) 22  Yes Sharron Gudino MD   losartan-hydroCHLOROthiazide [facility]) 50-12.5 MG per tablet Take 1 tablet by mouth daily 22  Yes Sharron Gudino MD   pantoprazole (PROTONIX) 20 MG tablet Take 1 tablet by mouth every morning (before breakfast) 21  Yes Sharron Gudino MD   Blood Pressure KIT Check blood pressure daily 20  Yes Varinder Delaney MD   blood glucose test strips (AGAMATRIX AMP TEST) strip Pt to check BS daily.  19  Yes Matt Cortés MD   Dianne Cater ULTRA-THIN LANCETS MISC Check BS daily 19  Yes Matt Cortés MD   Lancet Device MISC Pt to check BS daily 19  Yes Varinder Delaney MD   Blood Glucose Monitoring Suppl (AGAMATRIX AMP) SREE 1 glucometer 17  Yes Karla Jimenes DO       Current medications:    Current Facility-Administered Medications   Medication Dose Route Frequency Provider Last Rate Last Admin    0.9 % sodium chloride infusion   IntraVENous Continuous Josie Myles  mL/hr at 22 0831 New Bag at 22 0831    sodium chloride flush 0.9 % injection 10 mL  10 mL IntraVENous 2 times per day Josie Myles MD        sodium chloride flush 0.9 % injection 10 mL  10 mL IntraVENous PRN Josie Myles MD        0.9 % sodium chloride infusion  25 mL IntraVENous PRN Josie Myles MD Allergies:  No Known Allergies    Problem List:    Patient Active Problem List   Diagnosis Code    Type 2 diabetes mellitus without complication, without long-term current use of insulin ([facility] E11.9    Pure hypercholesterolemia E78.00    Essential hypertension I10       Past Medical History:        Diagnosis Date    Breast pain, right 2/1/2016    Diabetes ([facility]     Essential hypertension 8/26/2020    Hyperlipidemia        Past Surgical History:        Procedure Laterality Date    EWELINA STEROTACTIC LOC BREAST BIOPSY LEFT Left 11/11/2021    EWELINA STEROTACTIC LOC BREAST BIOPSY LEFT 11/11/2021 [facility]       Social History:    Social History     Tobacco Use    Smoking status: Never Smoker    Smokeless tobacco: Never Used   Substance Use Topics    Alcohol use: No                                Counseling given: Not Answered      Vital Signs (Current):   Vitals:    03/08/22 1619 03/11/22 0824   BP:  (!) 163/81   Pulse:  82   Resp:  20   Temp:  98.1 °F (36.7 °C)   TempSrc:  Temporal   SpO2:  100%   Weight: 143 lb (64.9 kg) 143 lb (64.9 kg)   Height: 5' 1\" (1.549 m) 5' 1\" (1.549 m)                                              BP Readings from Last 3 Encounters:   03/11/22 (!) 163/81   02/21/22 127/81   02/10/22 (!) 142/91       NPO Status: Time of last liquid consumption: 1700                        Time of last solid consumption: 1800                        Date of last liquid consumption: 03/10/22                        Date of last solid food consumption: 03/09/22    BMI:   Wt Readings from Last 3 Encounters:   03/11/22 143 lb (64.9 kg)   02/21/22 146 lb (66.2 kg)   02/10/22 139 lb (63 kg)     Body mass index is 27.02 kg/m².     CBC:   Lab Results   Component Value Date    WBC 7.7 08/02/2021    RBC 3.89 08/02/2021    HGB 10.9 08/02/2021    HCT 33.7 08/02/2021    MCV 86.6 08/02/2021    RDW 14.3 08/02/2021     08/02/2021       CMP:   Lab Results   Component Value Date     02/23/2022    K 3.7 02/23/2022    K 4.0 08/02/2021    CL 99 02/23/2022    CO2 20 02/23/2022    BUN 12 02/23/2022    CREATININE 0.5 02/23/2022    GFRAA >60 02/23/2022    LABGLOM >60 02/23/2022    GLUCOSE 131 03/11/2022    PROT 7.8 08/02/2021    CALCIUM 10.0 02/23/2022    BILITOT 0.6 08/02/2021    ALKPHOS 79 08/02/2021    AST 17 08/02/2021    ALT 17 08/02/2021       POC Tests: No results for input(s): POCGLU, POCNA, POCK, POCCL, POCBUN, POCHEMO, POCHCT in the last 72 hours. Coags: No results found for: PROTIME, INR, APTT    HCG (If Applicable): No results found for: PREGTESTUR, PREGSERUM, HCG, HCGQUANT     ABGs: No results found for: PHART, PO2ART, XNN4TID, TCC8EGB, BEART, K2XQNQAU     Type & Screen (If Applicable):  No results found for: LABABO, LABRH    Drug/Infectious Status (If Applicable):  No results found for: HIV, HEPCAB    COVID-19 Screening (If Applicable):   Lab Results   Component Value Date    COVID19 Not Detected 06/26/2021           Anesthesia Evaluation  Patient summary reviewed  Airway:         Dental:          Pulmonary:Negative Pulmonary ROS                              Cardiovascular:    (+) hypertension:, hyperlipidemia      ECG reviewed               Beta Blocker:  Not on Beta Blocker      ROS comment: EKG: Normal Sinus Rhythm 69,NS St & T changes. Neuro/Psych:   Negative Neuro/Psych ROS              GI/Hepatic/Renal:             Endo/Other:    (+) DiabetesType II DM, , .                  ROS comment: Rt Breast Pain. Abdominal:             Vascular: negative vascular ROS. Other Findings:             Anesthesia Plan      MAC     ASA 3       Induction: intravenous. continuous noninvasive hemodynamic monitor and BIS  MIPS: Postoperative opioids intended. Anesthetic plan and risks discussed with patient. Plan discussed with CRNA.     Attending anesthesiologist reviewed and agrees with José Luis Birmingham MD   3/11/2022

## 2022-03-11 NOTE — INTERVAL H&P NOTE
Update History & Physical    The patient's History and Physical of February 10, 2022 was reviewed with the patient and I examined the patient. There was no change. Plan: The risks, benefits, expected outcome, and alternative to the recommended procedure have been discussed with the patient. Patient understands and wants to proceed with the procedure.      Electronically signed by Bryant Man MD on 3/11/2022 at 9:12 AM

## 2022-03-11 NOTE — ANESTHESIA PRE PROCEDURE
Department of Anesthesiology  Preprocedure Note       Name:  Juana Villar   Age:  48 y.o.  :  1975                                          MRN:  [id number]         Date:  3/11/2022      Surgeon: Francois Franco):  Alli Pressley MD    Procedure: Procedure(s):  EGD BIOPSY  COLORECTAL CANCER SCREENING, NOT HIGH RISK    Medications prior to admission:   Prior to Admission medications    Medication Sig Start Date End Date Taking? Authorizing Provider   atorvastatin (LIPITOR) 40 MG tablet TAKE 1 TABLET BY MOUTH ONE TIME A DAY 22  Yes Tray Cunha MD   metFORMIN (GLUCOPHAGE) 500 MG tablet Take 1 tablet by mouth 2 times daily (with meals) 22  Yes Tray Cunha MD   losartan-hydroCHLOROthiazide [facility]) 50-12.5 MG per tablet Take 1 tablet by mouth daily 22  Yes Tray Cunha MD   pantoprazole (PROTONIX) 20 MG tablet Take 1 tablet by mouth every morning (before breakfast) 21  Yes Tray Cunha MD   Blood Pressure KIT Check blood pressure daily 20  Yes Casa Greene MD   blood glucose test strips (AGAMATRIX AMP TEST) strip Pt to check BS daily.  19  Yes Nalleyl Rutherford MD   H[employer] Figures ULTRA-THIN LANCETS MISC Check BS daily 19  Yes Nallely Rutherford MD   Lancet Device MISC Pt to check BS daily 19  Yes Casa Greene MD   Blood Glucose Monitoring Suppl (AGAMATRIX AMP) SREE 1 glucometer 17  Yes Epi Blair DO       Current medications:    Current Facility-Administered Medications   Medication Dose Route Frequency Provider Last Rate Last Admin    0.9 % sodium chloride infusion   IntraVENous Continuous Alli Pressley  mL/hr at 22 0831 New Bag at 22 0831    sodium chloride flush 0.9 % injection 10 mL  10 mL IntraVENous 2 times per day Alli Pressley MD        sodium chloride flush 0.9 % injection 10 mL  10 mL IntraVENous PRN Alli Pressley MD        0.9 % sodium chloride infusion  25 mL IntraVENous PRN Alli Pressley MD Allergies:  No Known Allergies    Problem List:    Patient Active Problem List   Diagnosis Code    Type 2 diabetes mellitus without complication, without long-term current use of insulin ([facility] E11.9    Pure hypercholesterolemia E78.00    Essential hypertension I10       Past Medical History:        Diagnosis Date    Breast pain, right 2/1/2016    Diabetes ([facility]     Essential hypertension 8/26/2020    Hyperlipidemia        Past Surgical History:        Procedure Laterality Date    EWELINA STEROTACTIC LOC BREAST BIOPSY LEFT Left 11/11/2021    EWELINA STEROTACTIC LOC BREAST BIOPSY LEFT 11/11/2021 [facility]       Social History:    Social History     Tobacco Use    Smoking status: Never Smoker    Smokeless tobacco: Never Used   Substance Use Topics    Alcohol use: No                                Counseling given: Not Answered      Vital Signs (Current):   Vitals:    03/08/22 1619 03/11/22 0824 03/11/22 1059   BP:  (!) 163/81 (!) 113/59   Pulse:  82 87   Resp:  20 16   Temp:  36.7 °C (98.1 °F)    TempSrc:  Temporal    SpO2:  100% 98%   Weight: 143 lb (64.9 kg) 143 lb (64.9 kg)    Height: 5' 1\" (1.549 m) 5' 1\" (1.549 m)                                               BP Readings from Last 3 Encounters:   03/11/22 (!) 113/59   03/11/22 (!) 113/59   02/21/22 127/81       NPO Status: Time of last liquid consumption: 1700                        Time of last solid consumption: 1800                        Date of last liquid consumption: 03/10/22                        Date of last solid food consumption: 03/09/22    BMI:   Wt Readings from Last 3 Encounters:   03/11/22 143 lb (64.9 kg)   02/21/22 146 lb (66.2 kg)   02/10/22 139 lb (63 kg)     Body mass index is 27.02 kg/m².     CBC:   Lab Results   Component Value Date    WBC 7.7 08/02/2021    RBC 3.89 08/02/2021    HGB 10.9 08/02/2021    HCT 33.7 08/02/2021    MCV 86.6 08/02/2021    RDW 14.3 08/02/2021     08/02/2021       CMP:   Lab Results   Component

## 2022-03-11 NOTE — OP NOTE
Operative Note      Patient: Cathy Claudio  YOB: 1975  MRN: [id number]    Date of Procedure: 3/11/2022    Pre-Op Diagnosis: SCREENING, ANEMIA, GERD    Post-Op Diagnosis: Mild gastirits, retained fluid in the stomach no food- likely mild gastroparesis , Normal Colon        Procedure(s):  EGD BIOPSY  COLORECTAL CANCER SCREENING, NOT HIGH RISK    Surgeon(s):  Jasmina Kc MD    Assistant:   * No surgical staff found *    Anesthesia: Monitor Anesthesia Care    Estimated Blood Loss (mL): Minimal    Complications: None    Specimens:   ID Type Source Tests Collected by Time Destination   A : Antrum BX Antrum Antrum SURGICAL PATHOLOGY Jasmina Kc MD 3/11/2022 1032        Implants:  * No implants in log *      Drains: * No LDAs found *    Findings: as above     Detailed Description of Procedure: The patient was placed on the table and sedated. The throat was numbed with Cetacaine spray. Bite block was placed. A lubricated scope was easily passed into the upper esophagus which looked  normal. . The distal esophagus looked  normal. . The scope was passed into the stomach and retroflexed. There was no hiatal hernia. The GE Junction was at 35cm. There was retained fluid in the stomach. The scope was passed down toward the pylorus. The antral mucosa all looked abnormal: mild gastritis. Antral biopsy was taken to check for H. pylori. The scope was then passed through the pylorus into the duodenal bulb which looked normal, then around to the distal duodenum which looked  normal. , and the scope was then withdrawn. The patient tolerated the procedure well. In the left side down decubitus position, a digital rectal exam was performed, Normal findings  were found   The scope was lubricated and inserted into the anus. The scope was then passed under direct visualization in a retrograde fashion to the base of the cecum. The ileocecal valve was photographed. The prep was satisfactory.  The scope was slowly withdraw findings below. Cecum: normal     Ascending colon:normal     Transverse colon : normal     Descending colon normal     Sigmoid colon:  normal     Rectum:normal     The scope was retroflexed at the anal verge. Normal findings  at the anal verge. The scope was then straightened and removed.      THE PATIENT TOLERATED THE PROCEDURE WELL    PLAN:  Continue PPI  Daily   Follow up EGD biopsies   Recommend follow up colonoscopy in 10 years   Will Call with results       Electronically signed by Edith Cisneros MD on 3/11/2022 at 10:53 AM

## 2022-03-11 NOTE — ANESTHESIA POSTPROCEDURE EVALUATION
Department of Anesthesiology  Postprocedure Note    Patient: Juana Villar  MRN: [id number]  [place]: 1975  Date of evaluation: 3/11/2022  Time:  11:02 AM     Procedure Summary     Date: 03/11/22 Room / Location: [address] [place] [address] / [facility]    Anesthesia Start: 1018 Anesthesia Stop: 1059    Procedures:       EGD BIOPSY (N/A )      COLORECTAL CANCER SCREENING, NOT HIGH RISK (N/A ) Diagnosis: (SCREENING, ANEMIA, GERD)    Surgeons: Alli Pressley MD Responsible Provider: Ema Groves MD    Anesthesia Type: MAC ASA Status: 3          Anesthesia Type: MAC    Jovani Phase I: Jovani Score: 10    Jovani Phase II:      Last vitals: Reviewed and per EMR flowsheets.        Anesthesia Post Evaluation    Patient location during evaluation: bedside  Patient participation: complete - patient participated  Level of consciousness: awake  Pain score: 0  Airway patency: patent  Nausea & Vomiting: no nausea and no vomiting  Complications: no  Cardiovascular status: hemodynamically stable  Respiratory status: room air and spontaneous ventilation  Hydration status: stable

## 2022-03-18 ENCOUNTER — HOSPITAL ENCOUNTER (OUTPATIENT)
Dept: ULTRASOUND IMAGING | Age: 47
Discharge: HOME | End: 2022-03-20

## 2022-03-18 DIAGNOSIS — R10.32 LEFT LOWER QUADRANT PAIN: ICD-10-CM

## 2022-03-18 DIAGNOSIS — R10.2 PELVIC PAIN: ICD-10-CM

## 2022-03-18 PROCEDURE — 76830 TRANSVAGINAL US NON-OB: CPT

## 2022-03-18 PROCEDURE — 76856 US EXAM PELVIC COMPLETE: CPT

## 2022-03-21 ENCOUNTER — OFFICE VISIT (OUTPATIENT)
Dept: OBGYN | Age: 47
End: 2022-03-21

## 2022-03-21 VITALS
HEIGHT: 61 IN | BODY MASS INDEX: 27.75 KG/M2 | SYSTOLIC BLOOD PRESSURE: 126 MMHG | WEIGHT: 147 LBS | HEART RATE: 99 BPM | DIASTOLIC BLOOD PRESSURE: 81 MMHG

## 2022-03-21 DIAGNOSIS — R93.89 ABNORMAL PELVIC ULTRASOUND: ICD-10-CM

## 2022-03-21 DIAGNOSIS — R10.32 LEFT LOWER QUADRANT PAIN: ICD-10-CM

## 2022-03-21 DIAGNOSIS — R10.2 PELVIC PAIN: Primary | ICD-10-CM

## 2022-03-21 PROCEDURE — 99212 OFFICE O/P EST SF 10 MIN: CPT | Performed by: OBSTETRICS & GYNECOLOGY

## 2022-03-21 NOTE — PROGRESS NOTES
Here for test results today on her sonogram.  Sonoshows possible an endometrial polyp but the ovaires were clear. Discussed this finding with the patient at some length. Pap was negative. Abdi Nixon here to translate for today's visit. I will have her see Dr. Janey Castellanos or Dr. Becky Bustamante to see if they would recommend proceeding with a D&C.

## 2022-03-21 NOTE — PROGRESS NOTES
Patient alert and pleasant with no new concerns  Here today for US results  [facility]  here to assist with this visit   Discharge instructions have been discussed with the patient. Patient advised to call our office with any questions or concerns. Voiced understanding.

## 2022-03-28 ENCOUNTER — TELEPHONE (OUTPATIENT)
Dept: SURGERY | Age: 47
End: 2022-03-28

## 2022-03-28 NOTE — TELEPHONE ENCOUNTER
----- Message from Briana Guillen MD sent at 3/24/2022  2:19 PM EDT -----  Please let the patient know that her biopsies only showed mild to moderate chronic gastritis to stay on her PPI for her reflux symptoms. Her colonoscopy as mentioned postop was normal she will not require a new one for 10 years.

## 2022-03-28 NOTE — TELEPHONE ENCOUNTER
MA made attempt to contact patient via [address] [place] [address]  to discuss results. Patient did not answer so MA left  requesting return call to discuss.     Electronically signed by Blessing Martini on 3/28/22 at 10:38 AM EDT

## 2022-04-05 ENCOUNTER — TELEPHONE (OUTPATIENT)
Dept: SURGERY | Age: 47
End: 2022-04-05

## 2022-04-05 NOTE — TELEPHONE ENCOUNTER
MA made third attempt to contact patient for results. Patient did not answer so MA left  requesting return call to inform.     Electronically signed by Bia Riley on 4/5/22 at 4:08 PM EDT

## 2022-04-21 ENCOUNTER — OFFICE VISIT (OUTPATIENT)
Dept: OBGYN | Age: 47
End: 2022-04-21

## 2022-04-21 ENCOUNTER — TELEPHONE (OUTPATIENT)
Dept: OBGYN | Age: 47
End: 2022-04-21

## 2022-04-21 VITALS
HEART RATE: 81 BPM | BODY MASS INDEX: 27.96 KG/M2 | SYSTOLIC BLOOD PRESSURE: 126 MMHG | DIASTOLIC BLOOD PRESSURE: 82 MMHG | WEIGHT: 148 LBS

## 2022-04-21 DIAGNOSIS — N84.0 ENDOMETRIAL POLYP: Primary | ICD-10-CM

## 2022-04-21 PROCEDURE — 99212 OFFICE O/P EST SF 10 MIN: CPT | Performed by: OBSTETRICS & GYNECOLOGY

## 2022-04-21 PROCEDURE — 99213 OFFICE O/P EST LOW 20 MIN: CPT | Performed by: OBSTETRICS & GYNECOLOGY

## 2022-04-21 RX ORDER — FLIBANSERIN 100 MG/1
1 TABLET, FILM COATED ORAL DAILY
Qty: 30 TABLET | Refills: 1 | Status: SHIPPED | OUTPATIENT
Start: 2022-04-21

## 2022-04-21 NOTE — PROGRESS NOTES
HISTORY OF PRESENT ILLNESS:  Pt seen with   The patient presents today for surgical consultation. Diagnosis: Endometrial polyp  Procedure: Diagnostic hysteroscopy, D&C, Myosure polypectomy  Diagnostic findings: 2 possible polyps/submucosal fibroids- 8 mm and 11mm  Comorbidities: HTN, Type 2 diabetes, HLD    Pt also requests medication for low sexual desire. Denies history of depression or anxiety. Past Medical History:   Past Medical History:   Diagnosis Date    Breast pain, right 2016    Diabetes ([address]     Essential hypertension 2020    Hyperlipidemia                                              OB History    Para Term  AB Living   3 3           SAB IAB Ectopic Molar Multiple Live Births                    # Outcome Date GA Lbr Keshav/2nd Weight Sex Delivery Anes PTL Lv   3 Para 03     Vag-Spont      2 Para 00     Vag-Spont      1 Para 10/01/91     Vag-Spont            Past Surgical History:   Past Surgical History:   Procedure Laterality Date    COLONOSCOPY N/A 3/11/2022    COLORECTAL CANCER SCREENING, NOT HIGH RISK performed by Stefania Giordano MD at [address] [facility] EWELINA STEROTACTIC LOC BREAST BIOPSY LEFT Left 2021    EWELINA STEROTACTIC LOC BREAST BIOPSY LEFT 2021 [facility]    UPPER GASTROINTESTINAL ENDOSCOPY N/A 3/11/2022    EGD BIOPSY performed by Stefania Giordano MD at [facility] ENDOSCOPY        Allergies: Patient has no known allergies.      Medications:   Current Outpatient Medications   Medication Sig Dispense Refill    Flibanserin (ADDYI) 100 MG TABS Take 1 tablet by mouth daily 30 tablet 1    atorvastatin (LIPITOR) 40 MG tablet TAKE 1 TABLET BY MOUTH ONE TIME A DAY 90 tablet 1    metFORMIN (GLUCOPHAGE) 500 MG tablet Take 1 tablet by mouth 2 times daily (with meals) 60 tablet 3    losartan-hydroCHLOROthiazide (HYZAAR) 50-12.5 MG per tablet Take 1 tablet by mouth daily 90 tablet 1    pantoprazole (PROTONIX) 20 MG tablet Take 1 tablet by mouth every morning (before breakfast) 90 tablet 1    Blood Glucose Monitoring Suppl (AGAMATRIX AMP) SREE 1 glucometer 1 Device 0    Blood Pressure KIT Check blood pressure daily 1 kit 0    blood glucose test strips (AGAMATRIX AMP TEST) strip Pt to check BS daily. 100 each 3    AGAMATRIX ULTRA-THIN LANCETS MISC Check BS daily 100 each 3    Lancet Device MISC Pt to check BS daily 100 Device 3     No current facility-administered medications for this visit. Social History:   Social History     Tobacco Use    Smoking status: Never Smoker    Smokeless tobacco: Never Used   Substance Use Topics    Alcohol use: No        Family History:   Family History   Problem Relation Age of Onset    Diabetes Mother     High Blood Pressure Mother     Diabetes Father        REVIEW OF SYSTEMS:    Constitutional: negative  HEENT: negative  Breast: negative  Respiratory: negative  Cardiovascular: negative  Gastrointestinal: negative  Genitourinary: negative  Integument: negative  Neurological: negative  Endocrine: negative          PHYSICAL EXAM  /82   Pulse 81   Wt 148 lb (67.1 kg)   BMI 27.96 kg/m²       General appearance: alert, cooperative and in no acute distress. Head: NCAT   Psych: No acute distress, mood and affect appropriate                 ASSESSMENT :      Diagnosis Orders   1. Endometrial polyp  Flibanserin (ADDYI) 100 MG TABS   2. Low sexual desire disorder          PLAN:    Procedure explained in patient in detail. Return for pre op. Orders Placed This Encounter   Medications    Flibanserin (ADDYI) 100 MG TABS     Sig: Take 1 tablet by mouth daily     Dispense:  30 tablet     Refill:  1       No orders of the defined types were placed in this encounter.         Electronically signed by Roro Ruth DO on 4/21/22

## 2022-05-10 ENCOUNTER — OFFICE VISIT (OUTPATIENT)
Dept: OBGYN | Age: 47
End: 2022-05-10

## 2022-05-10 VITALS
DIASTOLIC BLOOD PRESSURE: 80 MMHG | HEART RATE: 104 BPM | BODY MASS INDEX: 27.59 KG/M2 | WEIGHT: 146 LBS | SYSTOLIC BLOOD PRESSURE: 142 MMHG

## 2022-05-10 DIAGNOSIS — N84.0 ENDOMETRIAL POLYP: Primary | ICD-10-CM

## 2022-05-10 PROCEDURE — 99213 OFFICE O/P EST LOW 20 MIN: CPT | Performed by: OBSTETRICS & GYNECOLOGY

## 2022-05-10 NOTE — PROGRESS NOTES
Department of Gynecology   History and Physical            HISTORY OF PRESENT ILLNESS:                     The patient is a 48 y.o. female  who presents with endometrial polyp seeking definitive surgical therapy. Diagnosis: Endometrial polyp  Procedure: Diagnostic hysteroscopy, D&C, Myosure polypectomy  Diagnostic findings: 2 possible polyps/submucosal fibroids- 8 mm and 11mm  Comorbidities: HTN, Type 2 diabetes, HLD       Past Medical History:        Diagnosis Date    Breast pain, right 2016    Diabetes ([address]     Essential hypertension 2020    Hyperlipidemia      Past Surgical History:        Procedure Laterality Date    COLONOSCOPY N/A 3/11/2022    COLORECTAL CANCER SCREENING, NOT HIGH RISK performed by Dustin Castillo MD at [address] EWELINA STEROTACTIC LOC BREAST BIOPSY LEFT Left 2021    EWELINA STEROTACTIC LOC BREAST BIOPSY LEFT 2021 [facility]    UPPER GASTROINTESTINAL ENDOSCOPY N/A 3/11/2022    EGD BIOPSY performed by Dustin Castillo MD at [address]         OB History    Para Term  AB Living   3 3           SAB IAB Ectopic Molar Multiple Live Births                    # Outcome Date GA Lbr Keshav/2nd Weight Sex Delivery Anes PTL Lv   3 Para 03     Vag-Spont      2 Para 00     Vag-Spont      1 Para 10/01/91     Vag-Spont          Medications Prior to Admission:   Not in a hospital admission. Allergies:  Patient has no known allergies.      Social History:  Social History     Socioeconomic History    Marital status:      Spouse name: Ember Zafar Number of children: 3    Years of education: 15    Highest education level: Not on file   Occupational History    Not on file   Tobacco Use    Smoking status: Never Smoker    Smokeless tobacco: Never Used   Vaping Use    Vaping Use: Never used   Substance and Sexual Activity    Alcohol use: No    Drug use: No    Sexual activity: Yes     Partners: Male   Other Topics Concern  Not on file   Social History Narrative    Not on file     Social Determinants of Health     Financial Resource Strain: Medium Risk    Difficulty of Paying Living Expenses: Somewhat hard   Food Insecurity: Food Insecurity Present    Worried About Running Out of Food in the Last Year: Sometimes true    [place] of Food in the Last Year: Sometimes true   Transportation Needs:     Lack of Transportation (Medical): Not on file    Lack of Transportation (Non-Medical): Not on file   Physical Activity:     Days of Exercise per Week: Not on file    Minutes of Exercise per Session: Not on file   Stress:     Feeling of Stress : Not on file   Social Connections:     Frequency of Communication with Friends and Family: Not on file    Frequency of Social Gatherings with Friends and Family: Not on file    Attends Taoism Services: Not on file    Active Member of [address] [place] [address] or Organizations: Not on file    Attends Club or Organization Meetings: Not on file    Marital Status: Not on file   Intimate Partner Violence:     Fear of Current or Ex-Partner: Not on file    Emotionally Abused: Not on file    Physically Abused: Not on file    Sexually Abused: Not on file   Housing Stability:     Unable to Pay for Housing in the Last Year: Not on file    Number of [place] in the Last Year: Not on file    Unstable Housing in the Last Year: Not on file       Family History:       Problem Relation Age of Onset    Diabetes Mother     High Blood Pressure Mother     Diabetes Father        REVIEW OF SYSTEMS:      Constitutional:  negative  Gastrointestinal:  negative  Genitourinary:  negative  Integument/breast:  negative  Hematologic/lymphatic:  negative  Endocrine:  negative  Behavior/Psych:  negative    PHYSICAL EXAM:    Vitals: There were no vitals taken for this visit.   Gen A/Ox3  Heart RRR  Lungs Clear  Abd Soft, NT, ND , +BS  Ext No edema  Pelvic: Normal external female genitalia, vaginal mucosa pink and cervix without lesions, uterus normal size and freely mobile, no adnexal TTP or masses, no discharge or bleeding      ASSESSMENT AND PLAN:      47 yo with endometrial polyp  To OR for diagnostic hysteroscopy, dilation and curettage, myosure polypectomy. R/B/A explained and informed consent obtained. Discussed postop instructions.     Agusto Chi DO 5/10/2022 2:32 PM

## 2022-05-10 NOTE — PROGRESS NOTES
Patient here today for pre-op appt. Patient is scheduled for Dilatation and Curettage Hysteroscopy Myosure Polypectomy on 5/18/22 at [facility]. All pre-op information given to patient and she voiced understanding. Pelvic exam done by Dr. Pearle Schwab. Discharge instructions have been discussed with the patient. Patient advised to call our office with any questions or concerns. Voiced understanding.

## 2022-05-16 NOTE — PROGRESS NOTES
[facility] PRE-ADMISSION TESTING INSTRUCTIONS    The Preadmission Testing patient is instructed accordingly using the following criteria (check applicable):    ARRIVAL INSTRUCTIONS:  [x] Parking the day of Surgery is located in the Main Entrance lot. Upon entering the door, make an immediate right to the surgery reception desk    [x] Bring photo ID and insurance card    [] Bring in a copy of Living will or Durable Power of  papers. [x] Please be sure to arrange for responsible adult to provide transportation to and from the hospital    [] Please arrange for responsible adult to be with you for the 24 hour period post procedure due to having anesthesia    [x] If you awake am of surgery not feeling well or have temperature >100 please call [phone]    GENERAL INSTRUCTIONS:    [x] Nothing by mouth after midnight, including gum, candy, mints or water    [x] You may brush your teeth, but do not swallow any water    [x] Take medications as instructed with 1-2 oz of water    [] Stop herbal supplements and vitamins 5 days prior to procedure    [] Follow preop dosing of blood thinners per physician instructions    [] Take 1/2 dose of evening insulin, but no insulin after midnight    [x] No oral diabetic medications after midnight    [x] If diabetic and have low blood sugar or feel symptomatic, take 1-2oz apple juice only    [] Bring inhalers day of surgery    [] Bring C-PAP/ Bi-Pap day of surgery    [x] Bring urine specimen day of surgery    [x] Shower or bath with soap, lather and rinse well, AM of Surgery, no lotion, powders or creams to surgical site    [] Follow bowel prep as instructed per surgeon    [x] No tobacco products within 24 hours of surgery     [x] No alcohol or illegal drug use within 24 hours of surgery.     [x] Jewelry, body piercing's, eyeglasses, contact lenses and dentures are not permitted into surgery (bring cases)      [x] Please do not wear any nail polish, make up or hair products on the day of surgery    [x] You can expect a call the business day prior to procedure to notify you if your arrival time changes    [x] If you receive a survey after surgery we would greatly appreciate your comments    [] Parent/guardian of a minor must accompany their child and remain on the premises  the entire time they are under our care     [] Pediatric patients may bring favorite toy, blanket or comfort item with them    [] A caregiver or family member must remain with the patient during their stay if they are mentally handicapped, have dementia, disoriented or unable to use a call light or would be a safety concern if left unattended    [x] Please notify surgeon if you develop any illness between now and time of surgery (cold, cough, sore throat, fever, nausea, vomiting) or any signs of infections  including skin, wounds, and dental.    [x]  The Outpatient Pharmacy is available to fill your prescription here on your day of surgery, ask your preop nurse for details    [] Other instructions    EDUCATIONAL MATERIALS PROVIDED:    [] PAT Preoperative Education Packet/Booklet     [] Medication List    [] Transfusion bracelet applied with instructions    [] Shower with soap, lather and rinse well, and use CHG wipes provided the evening before surgery as instructed    [] Incentive spirometer with instructions

## 2022-05-17 ENCOUNTER — ANESTHESIA EVENT (OUTPATIENT)
Dept: OPERATING ROOM | Age: 47
End: 2022-05-17

## 2022-05-18 ENCOUNTER — HOSPITAL ENCOUNTER (OUTPATIENT)
Age: 47
Setting detail: OUTPATIENT SURGERY
Discharge: HOME | End: 2022-05-18
Attending: OBSTETRICS & GYNECOLOGY | Admitting: OBSTETRICS & GYNECOLOGY

## 2022-05-18 ENCOUNTER — ANESTHESIA (OUTPATIENT)
Dept: OPERATING ROOM | Age: 47
End: 2022-05-18

## 2022-05-18 VITALS
DIASTOLIC BLOOD PRESSURE: 69 MMHG | RESPIRATION RATE: 16 BRPM | WEIGHT: 142 LBS | TEMPERATURE: 96.6 F | HEIGHT: 61 IN | HEART RATE: 60 BPM | OXYGEN SATURATION: 97 % | BODY MASS INDEX: 26.81 KG/M2 | SYSTOLIC BLOOD PRESSURE: 146 MMHG

## 2022-05-18 LAB
ABO/RH: NORMAL
ANION GAP SERPL CALCULATED.3IONS-SCNC: 11 MMOL/L (ref 7–16)
ANTIBODY SCREEN: NORMAL
BUN BLDV-MCNC: 12 MG/DL (ref 6–20)
CALCIUM SERPL-MCNC: 9 MG/DL (ref 8.6–10.2)
CHLORIDE BLD-SCNC: 101 MMOL/L (ref 98–107)
CO2: 25 MMOL/L (ref 22–29)
CREAT SERPL-MCNC: 0.5 MG/DL (ref 0.5–1)
GFR AFRICAN AMERICAN: >60
GFR NON-AFRICAN AMERICAN: >60 ML/MIN/1.73
GLUCOSE BLD-MCNC: 171 MG/DL (ref 74–99)
HCG, URINE, POC: NEGATIVE
HCT VFR BLD CALC: 33.8 % (ref 34–48)
HEMOGLOBIN: 10.9 G/DL (ref 11.5–15.5)
Lab: NORMAL
MCH RBC QN AUTO: 27.3 PG (ref 26–35)
MCHC RBC AUTO-ENTMCNC: 32.2 % (ref 32–34.5)
MCV RBC AUTO: 84.5 FL (ref 80–99.9)
NEGATIVE QC PASS/FAIL: NORMAL
PDW BLD-RTO: 14.6 FL (ref 11.5–15)
PLATELET # BLD: 360 E9/L (ref 130–450)
PMV BLD AUTO: 10.4 FL (ref 7–12)
POSITIVE QC PASS/FAIL: NORMAL
POTASSIUM SERPL-SCNC: 4.2 MMOL/L (ref 3.5–5)
RBC # BLD: 4 E12/L (ref 3.5–5.5)
SODIUM BLD-SCNC: 137 MMOL/L (ref 132–146)
WBC # BLD: 8.7 E9/L (ref 4.5–11.5)

## 2022-05-18 PROCEDURE — 86901 BLOOD TYPING SEROLOGIC RH(D): CPT

## 2022-05-18 PROCEDURE — 2500000003 HC RX 250 WO HCPCS: Performed by: NURSE ANESTHETIST, CERTIFIED REGISTERED

## 2022-05-18 PROCEDURE — 3700000001 HC ADD 15 MINUTES (ANESTHESIA): Performed by: OBSTETRICS & GYNECOLOGY

## 2022-05-18 PROCEDURE — 93005 ELECTROCARDIOGRAM TRACING: CPT

## 2022-05-18 PROCEDURE — 3700000000 HC ANESTHESIA ATTENDED CARE: Performed by: OBSTETRICS & GYNECOLOGY

## 2022-05-18 PROCEDURE — 7100000010 HC PHASE II RECOVERY - FIRST 15 MIN: Performed by: OBSTETRICS & GYNECOLOGY

## 2022-05-18 PROCEDURE — 3600000014 HC SURGERY LEVEL 4 ADDTL 15MIN: Performed by: OBSTETRICS & GYNECOLOGY

## 2022-05-18 PROCEDURE — 85027 COMPLETE CBC AUTOMATED: CPT

## 2022-05-18 PROCEDURE — 88305 TISSUE EXAM BY PATHOLOGIST: CPT

## 2022-05-18 PROCEDURE — 2720000010 HC SURG SUPPLY STERILE: Performed by: OBSTETRICS & GYNECOLOGY

## 2022-05-18 PROCEDURE — 7100000011 HC PHASE II RECOVERY - ADDTL 15 MIN: Performed by: OBSTETRICS & GYNECOLOGY

## 2022-05-18 PROCEDURE — 7100000000 HC PACU RECOVERY - FIRST 15 MIN: Performed by: OBSTETRICS & GYNECOLOGY

## 2022-05-18 PROCEDURE — 58558 HYSTEROSCOPY BIOPSY: CPT | Performed by: OBSTETRICS & GYNECOLOGY

## 2022-05-18 PROCEDURE — 80048 BASIC METABOLIC PNL TOTAL CA: CPT

## 2022-05-18 PROCEDURE — 86900 BLOOD TYPING SEROLOGIC ABO: CPT

## 2022-05-18 PROCEDURE — 6360000002 HC RX W HCPCS

## 2022-05-18 PROCEDURE — 2709999900 HC NON-CHARGEABLE SUPPLY: Performed by: OBSTETRICS & GYNECOLOGY

## 2022-05-18 PROCEDURE — 36415 COLL VENOUS BLD VENIPUNCTURE: CPT

## 2022-05-18 PROCEDURE — 7100000001 HC PACU RECOVERY - ADDTL 15 MIN: Performed by: OBSTETRICS & GYNECOLOGY

## 2022-05-18 PROCEDURE — 2580000003 HC RX 258: Performed by: NURSE ANESTHETIST, CERTIFIED REGISTERED

## 2022-05-18 PROCEDURE — 86850 RBC ANTIBODY SCREEN: CPT

## 2022-05-18 PROCEDURE — 6360000002 HC RX W HCPCS: Performed by: NURSE ANESTHETIST, CERTIFIED REGISTERED

## 2022-05-18 PROCEDURE — 3600000004 HC SURGERY LEVEL 4 BASE: Performed by: OBSTETRICS & GYNECOLOGY

## 2022-05-18 PROCEDURE — 2500000003 HC RX 250 WO HCPCS

## 2022-05-18 RX ORDER — ONDANSETRON 2 MG/ML
INJECTION INTRAMUSCULAR; INTRAVENOUS PRN
Status: DISCONTINUED | OUTPATIENT
Start: 2022-05-18 | End: 2022-05-18 | Stop reason: SDUPTHER

## 2022-05-18 RX ORDER — SODIUM CHLORIDE 0.9 % (FLUSH) 0.9 %
5-40 SYRINGE (ML) INJECTION EVERY 12 HOURS SCHEDULED
Status: CANCELLED | OUTPATIENT
Start: 2022-05-18

## 2022-05-18 RX ORDER — KETOROLAC TROMETHAMINE 30 MG/ML
INJECTION, SOLUTION INTRAMUSCULAR; INTRAVENOUS PRN
Status: DISCONTINUED | OUTPATIENT
Start: 2022-05-18 | End: 2022-05-18 | Stop reason: SDUPTHER

## 2022-05-18 RX ORDER — SODIUM CHLORIDE 9 MG/ML
INJECTION, SOLUTION INTRAVENOUS PRN
Status: DISCONTINUED | OUTPATIENT
Start: 2022-05-18 | End: 2022-05-18 | Stop reason: HOSPADM

## 2022-05-18 RX ORDER — PROPOFOL 10 MG/ML
INJECTION, EMULSION INTRAVENOUS PRN
Status: DISCONTINUED | OUTPATIENT
Start: 2022-05-18 | End: 2022-05-18 | Stop reason: SDUPTHER

## 2022-05-18 RX ORDER — SODIUM CHLORIDE 0.9 % (FLUSH) 0.9 %
5-40 SYRINGE (ML) INJECTION EVERY 12 HOURS SCHEDULED
Status: DISCONTINUED | OUTPATIENT
Start: 2022-05-18 | End: 2022-05-18 | Stop reason: HOSPADM

## 2022-05-18 RX ORDER — PHENYLEPHRINE HCL IN 0.9% NACL 1 MG/10 ML
SYRINGE (ML) INTRAVENOUS PRN
Status: DISCONTINUED | OUTPATIENT
Start: 2022-05-18 | End: 2022-05-18 | Stop reason: SDUPTHER

## 2022-05-18 RX ORDER — MEPERIDINE HYDROCHLORIDE 25 MG/ML
12.5 INJECTION INTRAMUSCULAR; INTRAVENOUS; SUBCUTANEOUS EVERY 5 MIN PRN
Status: CANCELLED | OUTPATIENT
Start: 2022-05-18

## 2022-05-18 RX ORDER — SODIUM CHLORIDE 9 MG/ML
INJECTION, SOLUTION INTRAVENOUS PRN
Status: CANCELLED | OUTPATIENT
Start: 2022-05-18

## 2022-05-18 RX ORDER — IBUPROFEN 600 MG/1
600 TABLET ORAL EVERY 6 HOURS PRN
Qty: 30 TABLET | Refills: 0 | Status: SHIPPED | OUTPATIENT
Start: 2022-05-18

## 2022-05-18 RX ORDER — ROCURONIUM BROMIDE 10 MG/ML
INJECTION, SOLUTION INTRAVENOUS PRN
Status: DISCONTINUED | OUTPATIENT
Start: 2022-05-18 | End: 2022-05-18 | Stop reason: SDUPTHER

## 2022-05-18 RX ORDER — DEXAMETHASONE SODIUM PHOSPHATE 4 MG/ML
INJECTION, SOLUTION INTRA-ARTICULAR; INTRALESIONAL; INTRAMUSCULAR; INTRAVENOUS; SOFT TISSUE PRN
Status: DISCONTINUED | OUTPATIENT
Start: 2022-05-18 | End: 2022-05-18 | Stop reason: SDUPTHER

## 2022-05-18 RX ORDER — FENTANYL CITRATE 50 UG/ML
INJECTION, SOLUTION INTRAMUSCULAR; INTRAVENOUS PRN
Status: DISCONTINUED | OUTPATIENT
Start: 2022-05-18 | End: 2022-05-18 | Stop reason: SDUPTHER

## 2022-05-18 RX ORDER — SODIUM CHLORIDE 0.9 % (FLUSH) 0.9 %
5-40 SYRINGE (ML) INJECTION PRN
Status: CANCELLED | OUTPATIENT
Start: 2022-05-18

## 2022-05-18 RX ORDER — SODIUM CHLORIDE 9 MG/ML
INJECTION, SOLUTION INTRAVENOUS CONTINUOUS PRN
Status: DISCONTINUED | OUTPATIENT
Start: 2022-05-18 | End: 2022-05-18 | Stop reason: SDUPTHER

## 2022-05-18 RX ORDER — DIPHENHYDRAMINE HYDROCHLORIDE 50 MG/ML
12.5 INJECTION INTRAMUSCULAR; INTRAVENOUS
Status: CANCELLED | OUTPATIENT
Start: 2022-05-18 | End: 2022-05-18

## 2022-05-18 RX ORDER — GLYCOPYRROLATE 1 MG/5 ML
SYRINGE (ML) INTRAVENOUS PRN
Status: DISCONTINUED | OUTPATIENT
Start: 2022-05-18 | End: 2022-05-18 | Stop reason: SDUPTHER

## 2022-05-18 RX ORDER — SODIUM CHLORIDE 0.9 % (FLUSH) 0.9 %
5-40 SYRINGE (ML) INJECTION PRN
Status: DISCONTINUED | OUTPATIENT
Start: 2022-05-18 | End: 2022-05-18 | Stop reason: HOSPADM

## 2022-05-18 RX ORDER — OXYCODONE HYDROCHLORIDE 5 MG/1
5 TABLET ORAL PRN
Status: CANCELLED | OUTPATIENT
Start: 2022-05-18 | End: 2022-05-18

## 2022-05-18 RX ORDER — OXYCODONE HYDROCHLORIDE 5 MG/1
10 TABLET ORAL PRN
Status: CANCELLED | OUTPATIENT
Start: 2022-05-18 | End: 2022-05-18

## 2022-05-18 RX ORDER — MIDAZOLAM HYDROCHLORIDE 1 MG/ML
INJECTION INTRAMUSCULAR; INTRAVENOUS PRN
Status: DISCONTINUED | OUTPATIENT
Start: 2022-05-18 | End: 2022-05-18 | Stop reason: SDUPTHER

## 2022-05-18 RX ADMIN — DEXAMETHASONE SODIUM PHOSPHATE 10 MG: 4 INJECTION, SOLUTION INTRAMUSCULAR; INTRAVENOUS at 14:28

## 2022-05-18 RX ADMIN — Medication 0.2 MG: at 14:28

## 2022-05-18 RX ADMIN — Medication 100 MCG: at 15:12

## 2022-05-18 RX ADMIN — ROCURONIUM BROMIDE 5 MG: 10 INJECTION, SOLUTION INTRAVENOUS at 14:32

## 2022-05-18 RX ADMIN — FENTANYL CITRATE 25 MCG: 50 INJECTION, SOLUTION INTRAMUSCULAR; INTRAVENOUS at 15:04

## 2022-05-18 RX ADMIN — PROPOFOL 200 MG: 10 INJECTION, EMULSION INTRAVENOUS at 14:28

## 2022-05-18 RX ADMIN — ONDANSETRON 4 MG: 2 INJECTION INTRAMUSCULAR; INTRAVENOUS at 14:40

## 2022-05-18 RX ADMIN — SODIUM CHLORIDE: 9 INJECTION, SOLUTION INTRAVENOUS at 15:13

## 2022-05-18 RX ADMIN — Medication 100 MCG: at 15:18

## 2022-05-18 RX ADMIN — Medication 100 MCG: at 15:07

## 2022-05-18 RX ADMIN — MIDAZOLAM 2 MG: 1 INJECTION INTRAMUSCULAR; INTRAVENOUS at 14:20

## 2022-05-18 RX ADMIN — SODIUM CHLORIDE: 9 INJECTION, SOLUTION INTRAVENOUS at 14:20

## 2022-05-18 RX ADMIN — FENTANYL CITRATE 100 MCG: 50 INJECTION, SOLUTION INTRAMUSCULAR; INTRAVENOUS at 14:28

## 2022-05-18 RX ADMIN — KETOROLAC TROMETHAMINE 30 MG: 30 INJECTION, SOLUTION INTRAMUSCULAR at 15:22

## 2022-05-18 ASSESSMENT — PAIN - FUNCTIONAL ASSESSMENT: PAIN_FUNCTIONAL_ASSESSMENT: NONE - DENIES PAIN

## 2022-05-18 NOTE — PROGRESS NOTES
CLINICAL PHARMACY NOTE: MEDS TO BEDS    Total # of Prescriptions Filled: 1   The following medications were delivered to the patient:  · ibu 600    Additional Documentation:

## 2022-05-18 NOTE — OP NOTE
Operative Note      Patient: Abraham Delatorre  YOB: 1975  MRN: [id number]    Date of Procedure: 5/18/2022    Pre-Op Diagnosis: ENDOMETRIAL  POLYPS    Post-Op Diagnosis: Same       Procedure(s):  DILATATION AND CURETTAGE, HYSTEROSCOPY, Eldonna Robes, POLYPECTOMY    Surgeon(s):  Sylvie Ortiz DO    Assistant:   * No surgical staff found *    Anesthesia: General    Estimated Blood Loss (mL): less than 50     Complications: None    Specimens:   ID Type Source Tests Collected by Time Destination   A : ENDOMETRIAL CURETTINGS Tissue Tissue [address] [facility],[address], DO 5/18/2022 1519    B : ENDOMETRIAL POLYPS Tissue Tissue SURGICAL PATHOLOGY Sylvie Rebeccaymael, DO 5/18/2022 1520        Implants:  * No implants in log *      Drains: * No LDAs found *    Findings: The patient is a 78-year-old  woman with normal age-specific female secondary sexual characteristics and escutcheon. Bimanual exam revealed the uterus to be anteverted, anteflexed, and oriented along the anterior midline. The uterus sounded to 9 cm. Vaginal mucosa and cervix were of normal morphology. Hysteroscopic examination revealed a 1 cm polyp on the left side. There was an area of thickened endometrium on the right side. Ostia were visualized bilaterally. The remainder of the endometrial cavity was without visible pathology. Detailed Description of Procedure:   Patient was taken to the operating room where general anesthetic was administered. Upon adequate anesthesia, the patient was placed in the dorsolithotomy position and sterilely prepped and draped in a manner appropriate for this procedure. A bimanual exam was performed. Weighted speculum was placed. The anterior lip of the cervix was grasped with a single-tooth tenaculum. The cervix was serially dilated with Arsalan metal dilators. The uterus sounded to 9 cm.   The liquid media hysteroscope was inserted in the uterine cavity and a thorough hysteroscopic examination then ensued with the above-noted findings. The MyoSure device was then inserted into the uterine cavity and the after mentioned polyp was excised. The area of thickening on the right side was then biopsied and excised. The [facility] sure device and hysteroscope were then removed. A medium Crowell curette was then advanced into the uterine cavity and a thorough curettage was performed. At this time the procedure was complete. Excellent hemostasis was noted. All instruments were removed from the vagina. All sponge needle and instrument counts were found to be correct x2. The patient tolerated the procedure well and was sent to recovery in stable condition.     Electronically signed by Ag Saavedra DO on 5/18/2022 at 3:46 PM

## 2022-05-18 NOTE — ANESTHESIA POSTPROCEDURE EVALUATION
Department of Anesthesiology  Postprocedure Note    Patient: Connie Castro  MRN: [id number]  YOB: 1975  Date of evaluation: 5/18/2022  Time:  3:37 PM     Procedure Summary     Date: 05/18/22 Room / Location: [facility] OR 01 / [facility]    Anesthesia Start: 4638 Anesthesia Stop: 6229    Procedure: DILATATION AND CURETTAGE, HYSTEROSCOPY, MYOSURE, POLYPECTOMY (N/A Vagina ) Diagnosis: (ENDOMETRIAL  POLYPS)    Surgeons: Yuliana Morel DO Responsible Provider: Jess Powell MD    Anesthesia Type: MAC, general ASA Status: 3          Anesthesia Type: No value filed. Jovani Phase I: Jovani Score: 10    Jovani Phase II:      Last vitals: Reviewed and per EMR flowsheets.        Anesthesia Post Evaluation    Patient location during evaluation: PACU  Patient participation: waiting for patient participation  Level of consciousness: awake and alert  Airway patency: patent  Nausea & Vomiting: no nausea and no vomiting  Complications: no  Cardiovascular status: hemodynamically stable  Respiratory status: acceptable  Hydration status: euvolemic

## 2022-05-18 NOTE — ANESTHESIA PRE PROCEDURE
Department of Anesthesiology  Preprocedure Note       Name:  Yara Chapin   Age:  48 y.o.  :  1975                                          MRN:  [id number]         Date:  2022      Surgeon: Yolanda Lua):  Sandi Canavan, DO    Procedure: Procedure(s):  DILATATION AND CURETTAGE HYSTEROSCOPY MYOSURE POLYPECTOMY    Medications prior to admission:   Prior to Admission medications    Medication Sig Start Date End Date Taking? Authorizing Provider   Flibanserin (ADDYI) 100 MG TABS Take 1 tablet by mouth daily  Patient not taking: Reported on 5/10/2022 4/21/22   Sandi Canavan, DO   atorvastatin (LIPITOR) 40 MG tablet TAKE 1 TABLET BY MOUTH ONE TIME A DAY 22   Monico Espinoza MD   metFORMIN (GLUCOPHAGE) 500 MG tablet Take 1 tablet by mouth 2 times daily (with meals) 22   Monico Espinoza MD   losartan-hydroCHLOROthiazide [facility]) 50-12.5 MG per tablet Take 1 tablet by mouth daily 22   Monico Espinoza MD   pantoprazole (PROTONIX) 20 MG tablet Take 1 tablet by mouth every morning (before breakfast) 21   Monico Espinoza MD   Blood Pressure KIT Check blood pressure daily 20   Ramon Warner MD   blood glucose test strips (AGAMATRIX AMP TEST) strip Pt to check BS daily. 19   MD Jose Ball ULTRA-THIN LANCETS MISC Check BS daily 19   Austyn Antony MD   Lancet Device MISC Pt to check BS daily 19   Ramon Warner MD   Blood Glucose Monitoring Suppl (AGAMATRIX AMP) SREE 1 glucometer 17   Larry Cardenas DO       Current medications:    No current facility-administered medications for this visit. No current outpatient medications on file.      Facility-Administered Medications Ordered in Other Visits   Medication Dose Route Frequency Provider Last Rate Last Admin    sodium chloride flush 0.9 % injection 5-40 mL  5-40 mL IntraVENous 2 times per day Sandi Canavan, DO        sodium chloride flush 0.9 % injection 5-40 mL  5-40 mL IntraVENous PRN Sandi Canavan, DO  0.9 % sodium chloride infusion   IntraVENous PRN Roro Ruth DO           Allergies:  No Known Allergies    Problem List:    Patient Active Problem List   Diagnosis Code    Type 2 diabetes mellitus without complication, without long-term current use of insulin ([facility]) E11.9    Pure hypercholesterolemia E78.00    Essential hypertension I10       Past Medical History:        Diagnosis Date    [facility] [address] [address]     Essential hypertension 8/26/2020    GERD (gastroesophageal reflux disease)     Hyperlipidemia     Irregular menses        Past Surgical History:        Procedure Laterality Date    COLONOSCOPY N/A 3/11/2022    COLORECTAL CANCER SCREENING, NOT HIGH RISK performed by Messi Ferrer MD at [facility] [address] [facility] STEROTACTIC LOC BREAST BIOPSY LEFT Left 11/11/2021    [facility] STEROTACTIC LOC BREAST BIOPSY LEFT 11/11/2021 [facility]    UPPER GASTROINTESTINAL ENDOSCOPY N/A 3/11/2022    EGD BIOPSY performed by Messi Ferrer MD at [facility] History:    Social History     Tobacco Use    Smoking status: Never Smoker    Smokeless tobacco: Never Used   Substance Use Topics    Alcohol use: No                                Counseling given: Not Answered      Vital Signs (Current): There were no vitals filed for this visit.                                            BP Readings from Last 3 Encounters:   05/18/22 (!) 181/78   05/10/22 (!) 142/80   04/21/22 126/82       NPO Status:                                                                                 BMI:   Wt Readings from Last 3 Encounters:   05/18/22 142 lb (64.4 kg)   05/10/22 146 lb (66.2 kg)   04/21/22 148 lb (67.1 kg)     There is no height or weight on file to calculate BMI.    CBC:   Lab Results   Component Value Date    WBC 7.7 08/02/2021    RBC 3.89 08/02/2021    HGB 10.9 08/02/2021    HCT 33.7 08/02/2021    MCV 86.6 08/02/2021    RDW 14.3 08/02/2021     08/02/2021       CMP:   Lab Results Component Value Date     02/23/2022    K 3.7 02/23/2022    K 4.0 08/02/2021    CL 99 02/23/2022    CO2 20 02/23/2022    BUN 12 02/23/2022    CREATININE 0.5 02/23/2022    GFRAA >60 02/23/2022    LABGLOM >60 02/23/2022    GLUCOSE 131 03/11/2022    PROT 7.8 08/02/2021    CALCIUM 10.0 02/23/2022    BILITOT 0.6 08/02/2021    ALKPHOS 79 08/02/2021    AST 17 08/02/2021    ALT 17 08/02/2021       POC Tests: No results for input(s): POCGLU, POCNA, POCK, POCCL, POCBUN, POCHEMO, POCHCT in the last 72 hours. Coags: No results found for: PROTIME, INR, APTT    HCG (If Applicable): No results found for: PREGTESTUR, PREGSERUM, HCG, HCGQUANT     ABGs: No results found for: PHART, PO2ART, PVV8SUV, FYE6CMJ, BEART, D9NAEOYO     Type & Screen (If Applicable):  No results found for: LABABO, LABRH    Drug/Infectious Status (If Applicable):  No results found for: HIV, HEPCAB    COVID-19 Screening (If Applicable):   Lab Results   Component Value Date    COVID19 Not Detected 06/26/2021           Anesthesia Evaluation  Patient summary reviewed no history of anesthetic complications:   Airway: Mallampati: II        Dental:          Pulmonary:Negative Pulmonary ROS breath sounds clear to auscultation                             Cardiovascular:    (+) hypertension:, hyperlipidemia      ECG reviewed  Rhythm: regular  Rate: normal           Beta Blocker:  Not on Beta Blocker      ROS comment: EKG: Normal Sinus Rhythm 69,NS St & T changes. Neuro/Psych:   Negative Neuro/Psych ROS              GI/Hepatic/Renal:   (+) GERD:,           Endo/Other:    (+) DiabetesType II DM, , .                  ROS comment: Rt Breast Pain. Abdominal:             Vascular: negative vascular ROS. Other Findings:             Anesthesia Plan      MAC and general     ASA 3     (#4 LMA)  Induction: intravenous. MIPS: Postoperative opioids intended and Prophylactic antiemetics administered.   Anesthetic plan and risks discussed with patient. Plan discussed with CRNA. I have reviewed the note and the patient and I agree.  Wilmot Brittle APRN CRNA Genette Ester, MD   5/18/2022

## 2022-05-18 NOTE — H&P
Department of Gynecology   History and Physical            HISTORY OF PRESENT ILLNESS:         Pt seen with . The patient is a 48 y.o. female  who presents with endometrial polyp seeking definitive surgical therapy. Diagnosis: Endometrial polyp  Procedure: Diagnostic hysteroscopy, D&C, Myosure polypectomy  Diagnostic findings: 2 possible polyps/submucosal fibroids- 8 mm and 11mm  Comorbidities: HTN, Type 2 diabetes, HLD       Past Medical History:        Diagnosis Date    Diabetes ([address]     Essential hypertension 2020    GERD (gastroesophageal reflux disease)     Hyperlipidemia     Irregular menses      Past Surgical History:        Procedure Laterality Date    COLONOSCOPY N/A 3/11/2022    COLORECTAL CANCER SCREENING, NOT HIGH RISK performed by Geena Retana MD at [address] [facility] STEROTACTIC LOC BREAST BIOPSY LEFT Left 2021    [facility]EROTACT[facility] BREAST BIOPSY LEFT 2021 [facility]    UPPER GASTROINTESTINAL ENDOSCOPY N/A 3/11/2022    EGD BIOPSY performed by Geena Retana MD at [facility]         OB History    Para Term  AB Living   3 3           SAB IAB Ectopic Molar Multiple Live Births                    # Outcome Date GA Lbr Keshav/2nd Weight Sex Delivery Anes PTL Lv   3 Para 03     Vag-Spont      2 Para 00     Vag-Spont      1 Para 10/01/91     Vag-Spont          Medications Prior to Admission:   No medications prior to admission. Allergies:  Patient has no known allergies.      Social History:  Social History     Socioeconomic History    Marital status:      Spouse name: Maikel Cota Number of children: 3    Years of education: 15    Highest education level: None   Occupational History    None   Tobacco Use    Smoking status: Never Smoker    Smokeless tobacco: Never Used   Vaping Use    Vaping Use: Never used   Substance and Sexual Activity    Alcohol use: No    Drug use: No    Sexual activity: Yes     Partners: Male   Other Topics Concern    None   Social History Narrative    None     Social Determinants of Health     Financial Resource Strain: Medium Risk    Difficulty of Paying Living Expenses: Somewhat hard   Food Insecurity: Food Insecurity Present    Worried About Running Out of Food in the Last Year: Sometimes true    Juancho of Food in the Last Year: Sometimes true   Transportation Needs:     Lack of Transportation (Medical): Not on file    Lack of Transportation (Non-Medical):  Not on file   Physical Activity:     Days of Exercise per Week: Not on file    Minutes of Exercise per Session: Not on file   Stress:     Feeling of Stress : Not on file   Social Connections:     Frequency of Communication with Friends and Family: Not on file    Frequency of Social Gatherings with Friends and Family: Not on file    Attends Baptism Services: Not on file    Active Member of [address] [place] [address] [employer] or Organizations: Not on file    Attends Club or Organization Meetings: Not on file    Marital Status: Not on file   Intimate Partner Violence:     Fear of Current or Ex-Partner: Not on file    Emotionally Abused: Not on file    Physically Abused: Not on file    Sexually Abused: Not on file   Housing Stability:     Unable to Pay for Housing in the Last Year: Not on file    Number of [place] in the Last Year: Not on file    Unstable Housing in the Last Year: Not on file       Family History:       Problem Relation Age of Onset    Diabetes Mother     High Blood Pressure Mother     Diabetes Father        REVIEW OF SYSTEMS:      Constitutional:  negative  Gastrointestinal:  negative  Genitourinary:  negative  Integument/breast:  negative  Hematologic/lymphatic:  negative  Endocrine:  negative  Behavior/Psych:  negative    PHYSICAL EXAM:    Vitals:  Ht 5' 1\" (1.549 m)   Wt 142 lb (64.4 kg)   LMP 04/28/2022 (Exact Date)   BMI 26.83 kg/m²   Gen A/Ox3  Heart RRR  Lungs Clear  Abd Soft, NT, ND , +BS  Ext No edema  Pelvic: Normal external female genitalia, vaginal mucosa pink and cervix without lesions, uterus normal size and freely mobile, no adnexal TTP or masses, no discharge or bleeding      ASSESSMENT AND PLAN:      49 yo with endometrial polyp  To OR for diagnostic hysteroscopy, dilation and curettage, myosure polypectomy. R/B/A explained and informed consent obtained. Discussed postop instructions.     Carrie Gutierrez DO 5/18/2022 10:51 AM

## 2022-05-19 LAB
EKG ATRIAL RATE: 72 BPM
EKG P AXIS: 30 DEGREES
EKG P-R INTERVAL: 156 MS
EKG Q-T INTERVAL: 404 MS
EKG QRS DURATION: 86 MS
EKG QTC CALCULATION (BAZETT): 442 MS
EKG R AXIS: 49 DEGREES
EKG T AXIS: 35 DEGREES
EKG VENTRICULAR RATE: 72 BPM

## 2022-06-08 ENCOUNTER — OFFICE VISIT (OUTPATIENT)
Dept: OBGYN | Age: 47
End: 2022-06-08

## 2022-06-08 VITALS
BODY MASS INDEX: 27.59 KG/M2 | HEART RATE: 94 BPM | SYSTOLIC BLOOD PRESSURE: 129 MMHG | WEIGHT: 146 LBS | DIASTOLIC BLOOD PRESSURE: 69 MMHG

## 2022-06-08 DIAGNOSIS — Z98.890 POSTOPERATIVE STATE: Primary | ICD-10-CM

## 2022-06-08 PROCEDURE — 99024 POSTOP FOLLOW-UP VISIT: CPT | Performed by: OBSTETRICS & GYNECOLOGY

## 2022-06-08 PROCEDURE — 99212 OFFICE O/P EST SF 10 MIN: CPT | Performed by: OBSTETRICS & GYNECOLOGY

## 2022-06-08 NOTE — PROGRESS NOTES
Patient alert and pleasant with no complaints  Here today for Post-op exam.  [facility] liaison/, Dez Coats present for interpretation. Discharge instructions have been discussed with the patient. Patient advised to call our office with any questions or concerns. Voiced understanding.

## 2022-06-08 NOTE — PROGRESS NOTES
HISTORY OF PRESENT ILLNESS:    50 y.o. female  here for post op appointment. The patient has no complaints. Pain is controlled. Denies vaginal bleeding. Tolerating regular diet. Denies gastrointestinal or urinary complaints. Diagnosis: ENDOMETRIAL  POLYPS  Surgery: DILATATION AND CURETTAGE, HYSTEROSCOPY, MYOSURE, POLYPECTOMY  Findings:  The patient is a 53-year-old  woman with normal age-specific female secondary sexual characteristics and escutcheon. Bimanual exam revealed the uterus to be anteverted, anteflexed, and oriented along the anterior midline. The uterus sounded to 9 cm. Vaginal mucosa and cervix were of normal morphology. Hysteroscopic examination revealed a 1 cm polyp on the left side. There was an area of thickened endometrium on the right side. Ostia were visualized bilaterally. The remainder of the endometrial cavity was without visible pathology. Path report was benign, showed endometrial polyps  Complications: none    Pt had a period since surgery, period was much lighter and less painful.       Past Medical History:   Past Medical History:   Diagnosis Date    Diabetes ([facility] [address]     Essential hypertension 2020    GERD (gastroesophageal reflux disease)     Hyperlipidemia     Irregular menses                                              OB History    Para Term  AB Living   3 3           SAB IAB Ectopic Molar Multiple Live Births                    # Outcome Date GA Lbr Keshav/2nd Weight Sex Delivery Anes PTL Lv   3 Para 03     Vag-Spont      2 Para 00     Vag-Spont      1 Para 10/01/91     Vag-Spont             Past Surgical History:   Past Surgical History:   Procedure Laterality Date    COLONOSCOPY N/A 3/11/2022    COLORECTAL CANCER SCREENING, NOT HIGH RISK performed by Messi Ferrer MD at [place] [address] N/A 2022    DILATATION AND CURETTAGE, HYSTEROSCOPY, MYOSURE, POLYPECTOMY performed by Laura Vasquez Verna Blight at [facility] [address] [facility] STEROTACTIC LOC BREAST BIOPSY LEFT Left 11/11/2021    [facility] STEROTACTIC LOC BREAST BIOPSY LEFT 11/11/2021 [facility]    UPPER GASTROINTESTINAL ENDOSCOPY N/A 3/11/2022    EGD BIOPSY performed [facility] Sofiya Campos MD at [facility] ENDOSCOPY        Allergies: Patient has no known allergies. Medications:   Current Outpatient Medications   Medication Sig Dispense Refill    ibuprofen (ADVIL;MOTRIN) 600 MG tablet Take 1 tablet by mouth every 6 hours as needed for Pain 30 tablet 0    atorvastatin (LIPITOR) 40 MG tablet TAKE 1 TABLET BY MOUTH ONE TIME A DAY 90 tablet 1    metFORMIN (GLUCOPHAGE) 500 MG tablet Take 1 tablet by mouth 2 times daily (with meals) 60 tablet 3    losartan-hydroCHLOROthiazide (HYZAAR) 50-12.5 MG per tablet Take 1 tablet by mouth daily 90 tablet 1    pantoprazole (PROTONIX) 20 MG tablet Take 1 tablet by mouth every morning (before breakfast) 90 tablet 1    Blood Pressure KIT Check blood pressure daily 1 kit 0    blood glucose test strips (AGAMATRIX AMP TEST) strip Pt to check BS daily. 100 each 3    AGAMATRIX ULTRA-THIN LANCETS MISC Check BS daily 100 each 3    Lancet Device MISC Pt to check BS daily 100 Device 3    Blood Glucose Monitoring Suppl (AGAMATRIX AMP) SREE 1 glucometer 1 Device 0    Flibanserin (ADDYI) 100 MG TABS Take 1 tablet by mouth daily (Patient not taking: Reported on 5/10/2022) 30 tablet 1     No current facility-administered medications for this visit.          Social History:   Social History     Tobacco Use    Smoking status: Never Smoker    Smokeless tobacco: Never Used   Substance Use Topics    Alcohol use: No        Family History:   Family History   Problem Relation Age of Onset    Diabetes Mother     High Blood Pressure Mother     Diabetes Father        REVIEW OF SYSTEMS:    Constitutional: negative  HEENT: negative  Breast: negative  Respiratory: negative  Cardiovascular: negative  Gastrointestinal:

## 2022-06-15 NOTE — PROGRESS NOTES
Isha Early 89  Internal Medicine Residency Program  [facility] Note      SUBJECTIVE:  CC: had concerns including Diabetes and Follow-up. Omero Champion presented to the [facility] for a routine visit  She stated that she is feeling well today. Blood sugar at home ranging from 140-200 mg/dl. No episodes of hypoglycemia    She was prescribed filbanserin in may by OB/GYN but she has not taking it as it was expensive    She stated that she has episodes of headache every morning, mild in nature. She has not been taking any medications. Headache not associated with any aggravating or relieving factor. Review Of Systems:  General: no fevers, chills, weight loss or gain.    Ears/Nose/Throat: no hearing loss, tinnitus, vertigo, nosebleed, nasal congestion, rhinorrhea, sore throat  Respiratory: no cough, pleuritic chest pain, dyspnea, or wheezing  Cardiovascular: no chest pain, angina, dyspnea on exertion, orthopnea, PND, palpitations, or claudication  Gastrointestinal: no nausea, vomiting, heartburn, diarrhea, constipation, abdominal pain, hematochezia or melena  Genitourinary: no urinary urgency, frequency, dysuria, nocturia, hesitancy, or incontinence  Musculoskeletal: no arthritis, arthralgia, myalgia, weakness, or morning stiffness  Skin: no abnormal pigmentation, rash, itching, masses, hair or nail changes    Outpatient Medications Marked as Taking for the 6/16/22 encounter (Office Visit) with Henrietta Thornton MD   Medication Sig Dispense Refill    atorvastatin (LIPITOR) 40 MG tablet TAKE 1 TABLET BY MOUTH ONE TIME A DAY 90 tablet 1    metFORMIN (GLUCOPHAGE) 1000 MG tablet Take 1 tablet by mouth 2 times daily (with meals) 60 tablet 3    losartan-hydroCHLOROthiazide (HYZAAR) 50-12.5 MG per tablet Take 1 tablet by mouth daily 90 tablet 1    pantoprazole (PROTONIX) 20 MG tablet Take 1 tablet by mouth every morning (before breakfast) 90 tablet 1    Blood Pressure KIT Check blood pressure daily 1 kit 0    blood glucose test strips (AGAMATRIX AMP TEST) strip Pt to check BS daily. 100 each 3    AGAMATRIX ULTRA-THIN LANCETS MISC Check BS daily 100 each 3    Lancet Device MISC Pt to check BS daily 100 Device 3    Blood Glucose Monitoring Suppl (AGAMATRIX AMP) SREE 1 glucometer 1 Device 0       I have reviewed all pertinent PMHx, PSHx, FamHx, SocialHx, medications, and allergies and updated history as appropriate. OBJECTIVE:    VS: /80 (Site: Left Upper Arm, Position: Sitting, Cuff Size: Medium Adult)   Pulse 74   Temp 97.2 °F (36.2 °C) (Temporal)   Resp 16   Ht 5' 1\" (1.549 m)   Wt 145 lb (65.8 kg)   LMP 05/28/2022 (Exact Date)   SpO2 99% Comment: room air  BMI 27.40 kg/m²   General appearance: Alert, Awake, Oriented times 3, no distress  Lungs: Lungs clear to auscultation bilaterally. No rhonchi, crackles or wheezes  Heart: S1 S2  Regular rate and rhythm. No rub, murmur or gallop  Abdomen: Abdomen soft, non-tender. non-distended BS normal. No masses, organomegaly, no guarding rebound or rigidity.   Extremities: No edema, Peripheral pulses palpable 2/4  Neuro: alert, awake, no gross focal neurologic deficit    ASSESSMENT/PLAN:  Essential Hypertension  Controlled  Blood pressure today 131/80 mmHg  Continue losartan hydrochlorothiazide 50/12.5 mg daily  Could not tolerate Amlodipine in the past due to bilateral leg swelling and facial flushing  Discontinued Lisinopril due to dry cough  Last BUN/Cr: 12/0.5 (5/22)  Urine micro albumin/creatinine ratio within normal limit (2/22)     Left breast lesion  No recent weight loss, appetite changes  No breast lump noted  US and mammogram of breast revealing focal asymmetry in the left breast  Follows with breast cancer clinic  Breast biopsy revealing Benign breast tissue with focal microcalcification, fibrocystic change, and patchy chronic inflammation (11/11/2021)  Next mammography in 10/22     Type II DM  Uncontrolled  HbA1c at goal- repeat today: 6.6%--> 6.5% (8/2020)--> 7.3% (6/21)--> 6.5%--> 7.5% (6/22)  Increased Metformin from 500 mg to 1000 mg twice daily  Urine microalbumin/creatinine ratio wnl (5/22)  Last BUN/Cr: 12/0.5 (5/22)  Last LDL 60 (7/21)  Diabetic Foot Exam 10/10, normal (8/2020)  Continue with lifestyle modifications   Seen by ophthalmology in 10/21  Next HbA1C check in 6/2022  Will check lipid panel next visit    H/o uterine polyp  S/p hysteroscopy and polypectomy (5/22)  Had regular period after that  No abdominal pain, menorrhagia reported     Neck pain   Stable  CT cervical spine revealing short left C7 rib which may result in thoracic outlet syndrome, small central disc protrusion at C5-C6, developmental fusion of atlantooccipital joints (8/21)  Physical therapy  Tylenol as needed     History of atypical chest pain  No recent episodes of chest pain  Last stress test normal (2013)     GERD  Stable  Takes over the counter Prilosec  Seen by general surgery in 2/22  S/p endoscopy revealing mild gastritis (3/22)  S/p colonoscopy- unremarkable findings (3/22)     Hyperlipidemia   Last JJY 02 (9641)--> 60 (7/21)  Continue Atorvastatin  Will check lipid panel next visit     Overweight (BMI 25.0-29. 9)  Advised on life style modification and weight loss     Pap smear for cervical cancer screening  Follows with OB/GYN  Pap smear unremarkable (2/22)    Health Care Maintenance   · DM screening if over weight or obese 38-68 years old; HbA1C 7.5%, diabetic  · Screening mammogram every 2 years- Screening mammogram remarkable for focal asymetry of left breast; biopsy revealing microcalcification, benign fatty tissue; next mammogram in 10/22  · PAP smear every 3 years-unremarkable (2/22)  · Colon cancer screening- ordered  · One-time screening for HCV infection to adults born between 1945 and 1965-nonreactive  · Annual lung cancer 54 to [de-identified] years who have a 30 pack-year smoking history and currently smoke or have quit within the past 15 years- not indicated  · ETOH misuse - N/A  · ASA for primary prevention of CVD and CRC with > 10% 10 year risk 50-61yo:N/A  · Diabetic foot exam (8/26/2020)  · Diabetic eye exam: 2 years ago; Seen by ophthalmology in 10/21; no retinopathy  · Hepatitis vaccine third dose today (6/22)  · Will come back in one week for PCV 20    I have reviewed my findings and recommendations with Adarsh Cormier and Dr Nel Saenz MD PGY-3  6/16/2022 9:50 AM

## 2022-06-16 ENCOUNTER — OFFICE VISIT (OUTPATIENT)
Dept: INTERNAL MEDICINE | Age: 47
End: 2022-06-16

## 2022-06-16 VITALS
DIASTOLIC BLOOD PRESSURE: 80 MMHG | HEIGHT: 61 IN | RESPIRATION RATE: 16 BRPM | SYSTOLIC BLOOD PRESSURE: 131 MMHG | TEMPERATURE: 97.2 F | HEART RATE: 74 BPM | WEIGHT: 145 LBS | OXYGEN SATURATION: 99 % | BODY MASS INDEX: 27.38 KG/M2

## 2022-06-16 DIAGNOSIS — Z23 NEED FOR PNEUMOCOCCAL VACCINE: Primary | ICD-10-CM

## 2022-06-16 DIAGNOSIS — I10 PRIMARY HYPERTENSION: ICD-10-CM

## 2022-06-16 DIAGNOSIS — E11.9 TYPE 2 DIABETES MELLITUS WITHOUT COMPLICATION, WITHOUT LONG-TERM CURRENT USE OF INSULIN (HCC): ICD-10-CM

## 2022-06-16 DIAGNOSIS — K21.9 GASTROESOPHAGEAL REFLUX DISEASE WITHOUT ESOPHAGITIS: ICD-10-CM

## 2022-06-16 LAB — HBA1C MFR BLD: 7.5 %

## 2022-06-16 PROCEDURE — 99213 OFFICE O/P EST LOW 20 MIN: CPT | Performed by: INTERNAL MEDICINE

## 2022-06-16 PROCEDURE — G0010 ADMIN HEPATITIS B VACCINE: HCPCS

## 2022-06-16 PROCEDURE — 83036 HEMOGLOBIN GLYCOSYLATED A1C: CPT | Performed by: INTERNAL MEDICINE

## 2022-06-16 PROCEDURE — 6360000002 HC RX W HCPCS

## 2022-06-16 PROCEDURE — 99212 OFFICE O/P EST SF 10 MIN: CPT | Performed by: INTERNAL MEDICINE

## 2022-06-16 PROCEDURE — 3051F HG A1C>EQUAL 7.0%<8.0%: CPT | Performed by: INTERNAL MEDICINE

## 2022-06-16 PROCEDURE — 90740 HEPB VACC 3 DOSE IMMUNSUP IM: CPT

## 2022-06-16 RX ORDER — LOSARTAN POTASSIUM AND HYDROCHLOROTHIAZIDE 12.5; 5 MG/1; MG/1
1 TABLET ORAL DAILY
Qty: 90 TABLET | Refills: 1 | Status: SHIPPED | OUTPATIENT
Start: 2022-06-16

## 2022-06-16 RX ORDER — ATORVASTATIN CALCIUM 40 MG/1
TABLET, FILM COATED ORAL
Qty: 90 TABLET | Refills: 1 | Status: SHIPPED | OUTPATIENT
Start: 2022-06-16

## 2022-06-16 RX ORDER — PANTOPRAZOLE SODIUM 20 MG/1
20 TABLET, DELAYED RELEASE ORAL
Qty: 90 TABLET | Status: CANCELLED | OUTPATIENT
Start: 2022-06-16

## 2022-06-16 ASSESSMENT — PATIENT HEALTH QUESTIONNAIRE - PHQ9
1. LITTLE INTEREST OR PLEASURE IN DOING THINGS: 1
SUM OF ALL RESPONSES TO PHQ QUESTIONS 1-9: 2
SUM OF ALL RESPONSES TO PHQ QUESTIONS 1-9: 2
SUM OF ALL RESPONSES TO PHQ9 QUESTIONS 1 & 2: 2
2. FEELING DOWN, DEPRESSED OR HOPELESS: 1
SUM OF ALL RESPONSES TO PHQ QUESTIONS 1-9: 2
SUM OF ALL RESPONSES TO PHQ QUESTIONS 1-9: 2

## 2022-06-16 NOTE — PATIENT INSTRUCTIONS
Instrucciones de descarga:  · Cumplir con los medicamentos  · Comience a citlalli metformina 1000 mg dos veces al día  · Hágase los laboratorios antes de venir a la próxima visita. · Seguimiento en 3 meses, o antes si los síntomas Stevphen Chill o no se resuelven      Discharge Instructions:   Be compliant with medications   Start taking metformin 1000 mg twice daily   Please have labs done before coming to next visit   Follow up in 3 months, or sooner if symptoms get worse or do not resolve    Patient Education        Dieta DASH: Instrucciones de cuidado  DASH Diet: Care Instructions  Instrucciones de cuidado     La dieta DASH es un plan de alimentación que puede ayudar a bajar la presión arterial. DASH es la sigla en inglés de \"Dietary Approaches to Stop Hypertension\" (medidas dietéticas para disminuir la hipertensión). Hipertensiónsignifica presión arterial larry. La dieta DASH se concentra en el consumo de alimentos con alto contenido de calcio, potasio y [place]. Estos nutrientes pueden disminuir la presión arterial. Los alimentos con el mayor contenido de [place] nutrientes son las frutas, las verduras, los productos lácteos de bajo contenido de [place], las nueces, las semillas y las legumbres. Bruno citlalli suplementos de calcio, potasio y magnesio, en lugar de comer alimentos ricos en estos nutrientes, no tiene elmismo efecto. La dieta DASH también incluye granos integrales, pescado y aves. La dieta DASH es mark de los cambios de estilo de virginie que quizá le recomiende billings médico para reducir la presión arterial larry. Es posible que billings médico también quiera que usted reduzca la cantidad de sodio en billings Cristel Basil. Reducir el sodio mientras sigue la dieta DASH puede bajar la presión arterial incluso másque únicamente con la dieta DASH. La atención de seguimiento es christianne parte clave de billings tratamiento y seguridad. Asegúrese de hacer y acudir a todas las citas, y llame a billings médico si está teniendo problemas.  También es christianne buena idea saber los resultados de susexámenes y mantener christianne lista de los medicamentos que padmaja. ¿Cómo puede cuidarse en el hogar? Cómo seguir la dieta DASH   Coma entre 4 y 5 porciones de fruta al día. Christianne porción incluye 1 fruta de [place], ½ taza de fruta enlatada o cortada en trozos, 1/4 taza de fruta seca o 4 onzas (½ taza) de jugo de frutas. Elija fruta con más frecuencia que jugo.  Consuma entre 4 y 5 porciones de verduras al día. Christianne porción incluye 1 taza de Rosi o de verduras de hojas crudas, ½ taza de verduras cocidas o cortadas en trozos, o 4 onzas (½ taza) de jugo de verduras. Elija comer verduras con más frecuencia que citlalli billings jugo.  Consuma entre 2 y 3 porciones de lácteos semidescremados y descremados al día. Christianne porción incluye 8 onzas de [place], 1 taza de yogur o 1½ onzas de [place]bar[place].  Coma entre 6 y 6 porciones de granos todos los [address] Christianne porción incluye 1 rebanada de pan, 1 onza de cereal seco, o ½ taza de arroz, pasta o cereal cocido. Trate de elegir productos de grano integral con la mayor frecuencia posible.  Limite la cantidad de [place]), aves y pescado a 2 porciones al día. Elvera Brian porción son 3 onzas, lo que es aproximadamente el tamaño de un eder de naipes.  Coma entre 4 y 5 porciones de nueces, semillas y legumbres (frijoles [habichuelas], lentejas y arvejas [chícharos] secos y cocidos) a la semana. Christianne porción incluye 1/3 taza de nueces, 2 cucharadas de semillas o ½ taza de frijoles o arvejas cocidos. [address] y aceites a 2 o 3 porciones al día. Christianne porción es 1 cucharadita de aceite vegetal o 2 cucharadas de aderezo para ensaladas.  Limite los dulces y el azúcar adicional a 5 porciones o menos por semana. Elvera Brian porción es 1 cucharada de [place] o [place], ½ taza de sorbete o 1 taza de limonada.  Consuma menos de 2,300 miligramos (mg) de sodio al día. Si limita billings consumo de sodio a 1,500 mg al día, puede reducir billings presión arterial aún más.   Lashell Poles en cuenta que todas estas son las cantidades sugeridas de porciones para las personas que consumen entre 1800 y 2000 calorías al día. La cantidad recomendada de porciones para usted podría ser diferente si necesita más o menos calorías. Consejos para tener éxito   Inicie con cambios pequeños. No intente hacer cambios radicales en billings dieta de manera repentina. Podría sentir que extraña pawan comidas favoritas y, por lo [place], harry christianne mayor probabilidad de que no cumpla el plan. Susan Farooq y Valeria. Benson Ninoa que esos cambios se conviertan en un hábito, incorpore algunos [employer].  Pruebe algunas de las siguientes cosas:  ? Fíjese el objetivo de comer christianne porción de fruta o de verduras en todas las comidas y los refrigerios. [place] facilitará alcanzar la cantidad diaria recomendada de frutas y verduras. ? Pruebe comer yogur cubierto con frutas frescas y nueces rey refrigerio o rey postre saludable. ? Agregue pat, tomate, pepino y cebolla a pawan sándwiches. ? Combine christianne masa de pizza precocida con queso mozzarella de bajo contenido de grasa (\"low-fat\") y cúbralo con abundantes verduras. Intente utilizar tomates, calabaza, espinaca, brócoli, zanahorias, coliflor y cebollas. ? Opte por comer christianne variedad de verduras cortadas en trozos con un aderezo de bajo contenido de grasa rey refrigerio, en lugar de \"chips\" (tipo jenny fritas) y un \"dip\" (producto untable). ? [place] semillas de girasol o almendras picadas [employer]. O intente agregar nueces o almendras picadas a las verduras cocidas. ? Pruebe algunas comidas vegetarianas con frijoles y arvejas. Theodoro Locker o frijoles rojos a las ensaladas. Prepare burritos y tacos con puré de frijoles pintos o negros. ¿Dónde puede encontrar más información en inglés? Quyen Rays a [email or web address] e ingrese a billings cuenta de MyChart.  Heike Higgins D057 en el cuadro \"Search Health Information\" para más información (en inglés) sobre \"Dieta DASH: Instrucciones de cuidado. \"     Si no tiene christianne cuenta, santa clic en el enlace \"Sign Up Now\". Revisado: 10 enero, 2022               Versión del contenido: 13.2  © 2006-2022 Healthwise, Incorporated. Las instrucciones de cuidado fueron adaptadas bajo licencia por [facility] ([facility]). Si usted tiene [place] afección médica o sobre estas instrucciones, siempre pregunte a billings profesional de elsie. Healthwise, Incorporated niega toda garantía o responsabilidad por billings uso de esta información. Patient Education        Camine para hacer ejercicio: Instrucciones de cuidado  Walking for Exercise: Care Instructions  Instrucciones de cuidado     Caminar es christianne de las maneras más fáciles para hacer el ejercicio necesario para la buena elsie. Hacer christianne caminata rápida por 30 minutos todos los días puede ayudarle a sentirse mejor y tener más energía. Puede ayudarle a bajar billings riesgo de enfermedades. Caminar puede ayudarle a [employer] radha yel corazón sanchez. Hable con billings médico antes de empezar un plan para caminar si tiene problemas cardíacos, otros problemas de elsie o no alfaro hecho actividad física hace mucho tiempo. Siga las instrucciones de billings médico sobre los niveles seguros deejercicio. La atención de seguimiento es christianne parte clave de billings tratamiento y seguridad. Asegúrese de hacer y acudir a todas las citas, y llame a billings médico si está teniendo problemas. También es christianne buena idea saber los [place] de susexámenes y mantener christianne lista de los medicamentos que padmaja. ¿Cómo puede cuidarse en el hogar? Cómo comenzar   Empiece lentamente y establezca christianne meta a corto plazo. Por ejemplo, camine aracely 5 o 10 minutos al día.  Aumente en forma gradual la distancia que camina cada día. Intente hacer por lo menos 30 minutos de ejercicio la mayoría de los días de la [place]. También podría nadar, montar en bicicleta o hacer otras actividades.    Si encontrar suficiente tiempo es un problema, está arnie hacer actividad en períodos de [employer] cortos a lo milton del día.  Para obtener los beneficios saludables para el corazón de caminar, debe caminar lo suficientemente rápido rey para aumentar la frecuencia cardíaca y la respiración, carol no fields rápido que no pueda hablar cómodamente.  Use zapatos cómodos que le queden arnie y le den buen soporte para pawan pies y tobillos. Cómo continuar con billings plan   Christianne vez que haya convertido las caminatas en un hábito, establezca christianne meta a más milton plazo. Palomo vez desee establecer la meta de caminar a paso rápido aracely más tiempo o caminar más lejos. Los expertos recomiendan hacer 2½ horas (150 minutos) de [place] moderada a la semana. Un latido cardíaco más rápido es lo que define la actividad de [facility].  Para mantenerse motivado, camine con amigos, compañeros de trabajo o [employer].  Use christianne aplicación para el teléfono o un podómetro (cuentapasos) para llevar un registro de pawan pasos todos los días. Establezca christianne meta para aumentar pawan pasos. Cuando alcance marie meta, establezca christianne meta más larry.  Si el tiempo impide christianne caminata al aire [place], camine en el centro comercial con un amigo. Las escuelas e faustin locales podrían tener un gimnasio bajo techo donde puede hacer caminatas. Cómo encontrar tiempo para caminar aracely el trabajo   Andrew a varias jose daniel de billings [place], o bájese del autobús unas paradas antes de la suya.  Use las escaleras en vez del ascensor, al menos por algunos pisos.  Sugiera a pawan compañeros de trabajo que cheng pawan reuniones mientras caminan dentro del edificio o al [employer].  Use el baño más lejos de billings oficina o estación de [place].  Utilice pawan descansos matutinos y vespertinos para hacer caminatas rápidas de 15 minutos. Cómo mantenerse seguro   Conozca billings entorno. Camine en un lugar seguro y arnie iluminado. Si está oscuro, camine con un compañero. Use ropa de colores juanjose.  Si puede, compre un chaleco o christianne chaqueta que refleje la [place].  Lleve un teléfono celular en tung de emergencias.  Zoie abundante agua. Lleve christianne botella de agua cuando camine. [place] es muy importante si hace calor.  Tenga cuidado de no resbalarse en las superficies mojadas o con hielo. También puede comprar \"grippers\" (agarres) para los zapatos para ayudarle a evitar resbalarse.  Preste atención a la superficie en que camina. Use las aceras y los senderos.  Si tiene problemas de [employer], EPOC o problemas cardíacos, o si no ha [employer] por [address] [facility]. Asnyka Deven 82 a billings médico antes de comenzar christianne Isaias. ¿Dónde puede encontrar más información en inglés? Bonnee Pleasure a [email or web address] e ingrese a billings cuenta de MyChart. Adan Duvall K435 en el Eather Sidney \"Search Health Information\" para más información (en inglés) sobre \"Camine para hacer ejercicio: Instrucciones de cuidado. \"     Si no tiene christianne cuenta, santa jayant en el enlace \"Sign Up Now\". Revisado: 12 Buna, 2021               Versión del contenido: 13.2  © 2006-2022 Healthwise, Incorporated. Las instrucciones de cuidado fueron adaptadas bajo licencia por [facility] ([facility]). Si usted tiene [place] afección médica o sobre estas instrucciones, siempre pregunte a billings profesional de elsie. Healthwise, Incorporated niega toda garantía o responsabilidad por billings uso de esta información.

## 2022-06-16 NOTE — PROGRESS NOTES
Patient given 3rd Hep B vaccine in left deltoid, pt tolerated well. VIS sheet given. Patient given verbal discharge instruction by Dr. Bryce Wells via Pablo Guevara. Patient given printed AVS at .

## 2022-06-16 NOTE — PROGRESS NOTES
Isha Early 89  Internal Medicine Residency Clinic    Attending Physician Statement  I have discussed the case, including pertinent history and exam findings with the resident physician. I agree with the assessment, plan and orders as documented by the resident. I have reviewed all pertinent PMHx, PSHx, FamHx, SocialHx, medications, and allergies and updated history as appropriate. Patient presents for routine follow up of medical problems. DM 2 -- A1c 7.5% -- increase metformin with lifestyle mods    HTN -- controlled on current meds    Hx of endometrial polyp -- s/p resection with resumption normal menses    Screening:   Pneumonia 20 today   Energix (3rd dose today)    Remainder of medical problems as per resident note.     Rukhsana Honeycutt DO  6/16/2022 9:38 AM

## 2022-09-12 ENCOUNTER — APPOINTMENT (OUTPATIENT)
Dept: GENERAL RADIOLOGY | Age: 47
End: 2022-09-12

## 2022-09-12 ENCOUNTER — HOSPITAL ENCOUNTER (EMERGENCY)
Age: 47
Discharge: HOME | End: 2022-09-12

## 2022-09-12 VITALS
SYSTOLIC BLOOD PRESSURE: 150 MMHG | BODY MASS INDEX: 27 KG/M2 | DIASTOLIC BLOOD PRESSURE: 91 MMHG | RESPIRATION RATE: 16 BRPM | HEIGHT: 61 IN | OXYGEN SATURATION: 100 % | TEMPERATURE: 98.4 F | HEART RATE: 93 BPM | WEIGHT: 143 LBS

## 2022-09-12 DIAGNOSIS — J40 BRONCHITIS: Primary | ICD-10-CM

## 2022-09-12 LAB
ALBUMIN SERPL-MCNC: 4.6 G/DL (ref 3.5–5.2)
ALP BLD-CCNC: 85 U/L (ref 35–104)
ALT SERPL-CCNC: 16 U/L (ref 0–32)
ANION GAP SERPL CALCULATED.3IONS-SCNC: 14 MMOL/L (ref 7–16)
AST SERPL-CCNC: 20 U/L (ref 0–31)
BASOPHILS ABSOLUTE: 0.07 E9/L (ref 0–0.2)
BASOPHILS RELATIVE PERCENT: 0.7 % (ref 0–2)
BILIRUB SERPL-MCNC: 0.4 MG/DL (ref 0–1.2)
BUN BLDV-MCNC: 13 MG/DL (ref 6–20)
CALCIUM SERPL-MCNC: 9.5 MG/DL (ref 8.6–10.2)
CHLORIDE BLD-SCNC: 100 MMOL/L (ref 98–107)
CO2: 23 MMOL/L (ref 22–29)
CREAT SERPL-MCNC: 0.5 MG/DL (ref 0.5–1)
EOSINOPHILS ABSOLUTE: 0.36 E9/L (ref 0.05–0.5)
EOSINOPHILS RELATIVE PERCENT: 3.5 % (ref 0–6)
GFR AFRICAN AMERICAN: >60
GFR NON-AFRICAN AMERICAN: >60 ML/MIN/1.73
GLUCOSE BLD-MCNC: 153 MG/DL (ref 74–99)
HCT VFR BLD CALC: 33.4 % (ref 34–48)
HEMOGLOBIN: 10.9 G/DL (ref 11.5–15.5)
IMMATURE GRANULOCYTES #: 0.03 E9/L
IMMATURE GRANULOCYTES %: 0.3 % (ref 0–5)
LYMPHOCYTES ABSOLUTE: 3.7 E9/L (ref 1.5–4)
LYMPHOCYTES RELATIVE PERCENT: 36.3 % (ref 20–42)
MCH RBC QN AUTO: 27.2 PG (ref 26–35)
MCHC RBC AUTO-ENTMCNC: 32.6 % (ref 32–34.5)
MCV RBC AUTO: 83.3 FL (ref 80–99.9)
MONOCYTES ABSOLUTE: 0.6 E9/L (ref 0.1–0.95)
MONOCYTES RELATIVE PERCENT: 5.9 % (ref 2–12)
NEUTROPHILS ABSOLUTE: 5.43 E9/L (ref 1.8–7.3)
NEUTROPHILS RELATIVE PERCENT: 53.3 % (ref 43–80)
PDW BLD-RTO: 15.1 FL (ref 11.5–15)
PLATELET # BLD: 445 E9/L (ref 130–450)
PMV BLD AUTO: 10.3 FL (ref 7–12)
POTASSIUM SERPL-SCNC: 4 MMOL/L (ref 3.5–5)
RBC # BLD: 4.01 E12/L (ref 3.5–5.5)
SARS-COV-2, NAAT: NOT DETECTED
SODIUM BLD-SCNC: 137 MMOL/L (ref 132–146)
TOTAL PROTEIN: 8 G/DL (ref 6.4–8.3)
TROPONIN, HIGH SENSITIVITY: <6 NG/L (ref 0–9)
WBC # BLD: 10.2 E9/L (ref 4.5–11.5)

## 2022-09-12 PROCEDURE — 93005 ELECTROCARDIOGRAM TRACING: CPT | Performed by: STUDENT IN AN ORGANIZED HEALTH CARE EDUCATION/TRAINING PROGRAM

## 2022-09-12 PROCEDURE — 96374 THER/PROPH/DIAG INJ IV PUSH: CPT

## 2022-09-12 PROCEDURE — 6370000000 HC RX 637 (ALT 250 FOR IP): Performed by: PHYSICIAN ASSISTANT

## 2022-09-12 PROCEDURE — 84484 ASSAY OF TROPONIN QUANT: CPT

## 2022-09-12 PROCEDURE — 36415 COLL VENOUS BLD VENIPUNCTURE: CPT

## 2022-09-12 PROCEDURE — 71046 X-RAY EXAM CHEST 2 VIEWS: CPT

## 2022-09-12 PROCEDURE — 85025 COMPLETE CBC W/AUTO DIFF WBC: CPT

## 2022-09-12 PROCEDURE — 87635 SARS-COV-2 COVID-19 AMP PRB: CPT

## 2022-09-12 PROCEDURE — 99285 EMERGENCY DEPT VISIT HI MDM: CPT

## 2022-09-12 PROCEDURE — 80053 COMPREHEN METABOLIC PANEL: CPT

## 2022-09-12 PROCEDURE — 6360000002 HC RX W HCPCS: Performed by: PHYSICIAN ASSISTANT

## 2022-09-12 RX ORDER — METHYLPREDNISOLONE 4 MG/1
TABLET ORAL
Qty: 21 TABLET | Status: SHIPPED | OUTPATIENT
Start: 2022-09-12 | End: 2022-09-18

## 2022-09-12 RX ORDER — AZITHROMYCIN 250 MG/1
250 TABLET, FILM COATED ORAL DAILY
Qty: 4 TABLET | Refills: 0 | Status: SHIPPED | OUTPATIENT
Start: 2022-09-12 | End: 2022-09-16

## 2022-09-12 RX ORDER — METHYLPREDNISOLONE SODIUM SUCCINATE 40 MG/ML
40 INJECTION, POWDER, LYOPHILIZED, FOR SOLUTION INTRAMUSCULAR; INTRAVENOUS ONCE
Status: COMPLETED | OUTPATIENT
Start: 2022-09-12 | End: 2022-09-12

## 2022-09-12 RX ORDER — AZITHROMYCIN 250 MG/1
500 TABLET, FILM COATED ORAL ONCE
Status: COMPLETED | OUTPATIENT
Start: 2022-09-12 | End: 2022-09-12

## 2022-09-12 RX ADMIN — AZITHROMYCIN 500 MG: 250 TABLET, FILM COATED ORAL at 20:49

## 2022-09-12 RX ADMIN — METHYLPREDNISOLONE SODIUM SUCCINATE 40 MG: 40 INJECTION, POWDER, FOR SOLUTION INTRAMUSCULAR; INTRAVENOUS at 20:49

## 2022-09-12 ASSESSMENT — PAIN DESCRIPTION - DESCRIPTORS: DESCRIPTORS: TIGHTNESS

## 2022-09-12 ASSESSMENT — PAIN - FUNCTIONAL ASSESSMENT: PAIN_FUNCTIONAL_ASSESSMENT: 0-10

## 2022-09-12 ASSESSMENT — PAIN DESCRIPTION - LOCATION: LOCATION: CHEST

## 2022-09-12 ASSESSMENT — PAIN SCALES - GENERAL: PAINLEVEL_OUTOF10: 7

## 2022-09-13 LAB
EKG ATRIAL RATE: 87 BPM
EKG P AXIS: 43 DEGREES
EKG P-R INTERVAL: 148 MS
EKG Q-T INTERVAL: 380 MS
EKG QRS DURATION: 68 MS
EKG QTC CALCULATION (BAZETT): 457 MS
EKG R AXIS: 43 DEGREES
EKG T AXIS: 27 DEGREES
EKG VENTRICULAR RATE: 87 BPM

## 2022-09-13 NOTE — ED PROVIDER NOTES
[address] [place] [address]  Department of Emergency Medicine   ED  Encounter Note  Admit Date/RoomTime: 2022  7:48 PM  ED Room:     NAME: Yumiko Samano  : 1975  MRN: [id number]     Chief Complaint:  Cough (Tastes like blood but not having any production of blood. ) and Chest Pain (Feels tightness in her chest)    History of Present Illness       Yumiko Samano is a 50 y.o. old female who presents to the emergency department by private vehicle, for cough and chest congestion, which began 1 month(s) prior to arrival.  Since onset the symptoms have been improved then recurred and have since been worsening and mild-moderate in severity. The symptoms are associated with no additional symptoms as it relates to today's visit. There has been no abdominal pain, dry cough, nausea, vomiting, diarrhea, earache, fever, headache, muscle aches, nasal congestion, neck stiffness, or rash. The patient has received at least one dose of a 2-dose vaccine against COVID-19 and pt took a covid test at home 3 days ago and was negative. She had covid in January.  service was used with this patient for all parts of visit. ROS   Pertinent positives and negatives are stated within HPI, all other systems reviewed and are negative. Past Medical History:  has a past medical history of Diabetes ([address] Essential hypertension, GERD (gastroesophageal reflux disease), Hyperlipidemia, and Irregular menses. Surgical History:  has a past surgical history that includes [facility] STEREO BREAST BX W LOC DEVICE 1ST LESION LEFT (Left, 2021); Upper gastrointestinal endoscopy (N/A, 3/11/2022); Colonoscopy (N/A, 3/11/2022); and Dilation and curettage of uterus (N/A, 2022). Social History:  reports that she has never smoked. She has never used smokeless tobacco. She reports that she does not drink alcohol and does not use drugs.     Family History: family history includes Diabetes in her father and mother; High Blood Pressure in her mother. Allergies: Patient has no known allergies. Physical Exam   Oxygen Saturation Interpretation: Normal on room air analysis. ED Triage Vitals [09/12/22 1828]   BP Temp Temp Source Heart Rate Resp SpO2 Height Weight   (!) 150/91 98.4 °F (36.9 °C) Oral 93 16 100 % 5' 1\" (1.549 m) 143 lb (64.9 kg)         Constitutional:  Alert, development consistent with age. Ears:  External Ears: Bilateral normal.               TM's & External Canals: normal TM's and external ear canals bilaterally. Nose:   There is no discharge, swelling or lesions noted. Sinuses: no bilateral maxillary sinus tenderness. no bilateral frontal sinus tenderness. Mouth:  normal tongue and buccal mucosa. Throat: no erythema or exudates noted. Teeth and gums normal..  Airway patent. Neck:  Supple. No meningeal signs. There is no  preauricular, submental, parotid, anterior cervical, and posterior cervical node tenderness. Respiratory:   Breath sounds: bilateral normal.  Lung sounds: normal.   CV:  Regular rate and rhythm, normal heart sounds, without pathological murmurs, ectopy, gallops, or rubs. GI:  Abdomen Soft, nontender, good bowel sounds. No firm or pulsatile mass. Integument:  Normal turgor. Warm, dry, without visible rash. Neurological:  Oriented. Motor functions intact.        Lab / Imaging Results   (All laboratory and radiology results have been personally reviewed by myself)  Labs:  Results for orders placed or performed during the hospital encounter of 09/12/22   COVID-19, Rapid    Specimen: Nasopharyngeal Swab   Result Value Ref Range    SARS-CoV-2, NAAT Not Detected Not Detected   Troponin   Result Value Ref Range    Troponin, High Sensitivity <6 0 - 9 ng/L   CBC with Auto Differential   Result Value Ref Range    WBC 10.2 4.5 - 11.5 E9/L    RBC 4.01 3.50 - 5.50 E12/L    Hemoglobin 10.9 (L) 11.5 - 15.5 g/dL    Hematocrit 33.4 (L) 34.0 - 48.0 %    MCV 83.3 80.0 - 99.9 fL    MCH 27.2 26.0 - 35.0 pg    MCHC 32.6 32.0 - 34.5 %    RDW 15.1 (H) 11.5 - 15.0 fL    Platelets 708 203 - 371 E9/L    MPV 10.3 7.0 - 12.0 fL    Neutrophils % 53.3 43.0 - 80.0 %    Immature Granulocytes % 0.3 0.0 - 5.0 %    Lymphocytes % 36.3 20.0 - 42.0 %    Monocytes % 5.9 2.0 - 12.0 %    Eosinophils % 3.5 0.0 - 6.0 %    Basophils % 0.7 0.0 - 2.0 %    Neutrophils Absolute 5.43 1.80 - 7.30 E9/L    Immature Granulocytes # 0.03 E9/L    Lymphocytes Absolute 3.70 1.50 - 4.00 E9/L    Monocytes Absolute 0.60 0.10 - 0.95 E9/L    Eosinophils Absolute 0.36 0.05 - 0.50 E9/L    Basophils Absolute 0.07 0.00 - 0.20 E9/L   Comprehensive Metabolic Panel   Result Value Ref Range    Sodium 137 132 - 146 mmol/L    Potassium 4.0 3.5 - 5.0 mmol/L    Chloride 100 98 - 107 mmol/L    CO2 23 22 - 29 mmol/L    Anion Gap 14 7 - 16 mmol/L    Glucose 153 (H) 74 - 99 mg/dL    BUN 13 6 - 20 mg/dL    Creatinine 0.5 0.5 - 1.0 mg/dL    GFR Non-African American >60 >=60 mL/min/1.73    GFR African American >60     Calcium 9.5 8.6 - 10.2 mg/dL    Total Protein 8.0 6.4 - 8.3 g/dL    Albumin 4.6 3.5 - 5.2 g/dL    Total Bilirubin 0.4 0.0 - 1.2 mg/dL    Alkaline Phosphatase 85 35 - 104 U/L    ALT 16 0 - 32 U/L    AST 20 0 - 31 U/L   EKG 12 Lead   Result Value Ref Range    Ventricular Rate 87 BPM    Atrial Rate 87 BPM    P-R Interval 148 ms    QRS Duration 68 ms    Q-T Interval 380 ms    QTc Calculation (Bazett) 457 ms    P Axis 43 degrees    R Axis 43 degrees    T Axis 27 degrees     EKG #1:  Interpreted by emergency department attending physician unless otherwise noted. 9/12/22  Time: 1846    Rhythm: normal sinus   Rate: normal  Axis: normal  Conduction: normal  ST Segments: normal  T Waves: non specific changes and inversion in  v1 and v2    Clinical Impression: non-specific EKG  Comparison to Prior tracings:  Today's ECG is improved from previous tracings. Imaging:   All Radiology results interpreted by Radiologist unless otherwise noted. XR CHEST (2 VW)   Final Result   No acute process. ED Course / Medical Decision Making     Medications   methylPREDNISolone sodium (SOLU-MEDROL) injection 40 mg (has no administration in time range)   azithromycin (ZITHROMAX) tablet 500 mg (has no administration in time range)        Re-examination:  9/12/22       Time: no distress    Consults:   None    Procedures:   None    Medical Decision Making:   Pt presents to ED with persistent cough and chest congestion lasting 1 month. She reports initially she had but she just had a cold and it seemed like it was getting better and then symptoms returned and have persisted. She took a home COVID test which was negative. Patient's physical exam unremarkable. Chest x-ray negative, EKG normal sinus, nonspecific, troponin negative. Plan is for Z-Phil and steroid pack. Follow-up with primary care as needed, return to emergency if worsening. Assessment     1. Bronchitis      Plan   Discharged home. Patient condition is good    New Medications     New Prescriptions    AZITHROMYCIN (ZITHROMAX) 250 MG TABLET    Take 1 tablet by mouth daily for 4 days    METHYLPREDNISOLONE (MEDROL, PHIL,) 4 MG TABLET    Take as directed on package insert days 1-6     Electronically signed by Tura Olszewski, PA-C   DD: 9/12/22  **This report was transcribed using voice recognition software. Every effort was made to ensure accuracy; however, inadvertent computerized transcription errors may be present.   END OF ED PROVIDER NOTE         Tura Olszewski, PA-C  09/13/22 [address] Shailesh Ghotra PA-C  09/13/22 [id number]

## 2022-12-11 ENCOUNTER — APPOINTMENT (OUTPATIENT)
Dept: CT IMAGING | Age: 47
End: 2022-12-11

## 2022-12-11 ENCOUNTER — APPOINTMENT (OUTPATIENT)
Dept: GENERAL RADIOLOGY | Age: 47
End: 2022-12-11

## 2022-12-11 ENCOUNTER — HOSPITAL ENCOUNTER (EMERGENCY)
Age: 47
Discharge: HOME | End: 2022-12-11

## 2022-12-11 VITALS
HEIGHT: 61 IN | TEMPERATURE: 98.1 F | WEIGHT: 138 LBS | DIASTOLIC BLOOD PRESSURE: 74 MMHG | HEART RATE: 96 BPM | OXYGEN SATURATION: 96 % | SYSTOLIC BLOOD PRESSURE: 146 MMHG | BODY MASS INDEX: 26.06 KG/M2 | RESPIRATION RATE: 16 BRPM

## 2022-12-11 DIAGNOSIS — M54.41 ACUTE BILATERAL LOW BACK PAIN WITH RIGHT-SIDED SCIATICA: Primary | ICD-10-CM

## 2022-12-11 LAB
BACTERIA: ABNORMAL /HPF
BILIRUBIN URINE: NEGATIVE
BLOOD, URINE: NEGATIVE
CLARITY: CLEAR
COLOR: ABNORMAL
GLUCOSE URINE: 250 MG/DL
HCG, URINE, POC: NEGATIVE
INFLUENZA A BY PCR: NOT DETECTED
INFLUENZA B BY PCR: NOT DETECTED
KETONES, URINE: NEGATIVE MG/DL
LEUKOCYTE ESTERASE, URINE: NEGATIVE
Lab: NORMAL
NEGATIVE QC PASS/FAIL: NORMAL
NITRITE, URINE: POSITIVE
PH UA: 6 (ref 5–9)
POSITIVE QC PASS/FAIL: NORMAL
PROTEIN UA: ABNORMAL MG/DL
RBC UA: ABNORMAL /HPF (ref 0–2)
SARS-COV-2, NAAT: NOT DETECTED
SPECIFIC GRAVITY UA: <=1.005 (ref 1–1.03)
UROBILINOGEN, URINE: 2 E.U./DL
WBC UA: ABNORMAL /HPF (ref 0–5)

## 2022-12-11 PROCEDURE — 87635 SARS-COV-2 COVID-19 AMP PRB: CPT

## 2022-12-11 PROCEDURE — 6370000000 HC RX 637 (ALT 250 FOR IP): Performed by: NURSE PRACTITIONER

## 2022-12-11 PROCEDURE — 72131 CT LUMBAR SPINE W/O DYE: CPT

## 2022-12-11 PROCEDURE — 99284 EMERGENCY DEPT VISIT MOD MDM: CPT

## 2022-12-11 PROCEDURE — 87502 INFLUENZA DNA AMP PROBE: CPT

## 2022-12-11 PROCEDURE — 6370000000 HC RX 637 (ALT 250 FOR IP): Performed by: PHYSICIAN ASSISTANT

## 2022-12-11 PROCEDURE — 81001 URINALYSIS AUTO W/SCOPE: CPT

## 2022-12-11 PROCEDURE — 73521 X-RAY EXAM HIPS BI 2 VIEWS: CPT

## 2022-12-11 PROCEDURE — 72131 CT LUMBAR SPINE W/O DYE: CPT | Performed by: RADIOLOGY

## 2022-12-11 PROCEDURE — 96372 THER/PROPH/DIAG INJ SC/IM: CPT

## 2022-12-11 PROCEDURE — 6360000002 HC RX W HCPCS: Performed by: NURSE PRACTITIONER

## 2022-12-11 RX ORDER — ORPHENADRINE CITRATE 30 MG/ML
60 INJECTION INTRAMUSCULAR; INTRAVENOUS ONCE
Status: COMPLETED | OUTPATIENT
Start: 2022-12-11 | End: 2022-12-11

## 2022-12-11 RX ORDER — METHYLPREDNISOLONE 4 MG/1
TABLET ORAL
Qty: 21 TABLET | Status: SHIPPED | OUTPATIENT
Start: 2022-12-11 | End: 2022-12-11 | Stop reason: SDUPTHER

## 2022-12-11 RX ORDER — PREDNISONE 20 MG/1
40 TABLET ORAL ONCE
Status: COMPLETED | OUTPATIENT
Start: 2022-12-11 | End: 2022-12-11

## 2022-12-11 RX ORDER — IBUPROFEN 600 MG/1
600 TABLET ORAL EVERY 6 HOURS PRN
Qty: 28 TABLET | Refills: 0 | Status: SHIPPED | OUTPATIENT
Start: 2022-12-11 | End: 2022-12-18

## 2022-12-11 RX ORDER — HYDROCODONE BITARTRATE AND ACETAMINOPHEN 5; 325 MG/1; MG/1
1 TABLET ORAL EVERY 6 HOURS PRN
Qty: 12 TABLET | Refills: 0 | Status: SHIPPED | OUTPATIENT
Start: 2022-12-11 | End: 2022-12-14

## 2022-12-11 RX ORDER — HYDROCODONE BITARTRATE AND ACETAMINOPHEN 5; 325 MG/1; MG/1
1 TABLET ORAL ONCE
Status: COMPLETED | OUTPATIENT
Start: 2022-12-11 | End: 2022-12-11

## 2022-12-11 RX ORDER — KETOROLAC TROMETHAMINE 30 MG/ML
30 INJECTION, SOLUTION INTRAMUSCULAR; INTRAVENOUS ONCE
Status: COMPLETED | OUTPATIENT
Start: 2022-12-11 | End: 2022-12-11

## 2022-12-11 RX ORDER — METHYLPREDNISOLONE 4 MG/1
TABLET ORAL
Qty: 21 TABLET | Refills: 0 | Status: SHIPPED | OUTPATIENT
Start: 2022-12-11 | End: 2022-12-17

## 2022-12-11 RX ORDER — METHOCARBAMOL 750 MG/1
750 TABLET, FILM COATED ORAL 4 TIMES DAILY
Qty: 20 TABLET | Refills: 0 | Status: SHIPPED | OUTPATIENT
Start: 2022-12-11 | End: 2022-12-16

## 2022-12-11 RX ORDER — OXYCODONE HYDROCHLORIDE AND ACETAMINOPHEN 5; 325 MG/1; MG/1
1 TABLET ORAL ONCE
Status: COMPLETED | OUTPATIENT
Start: 2022-12-11 | End: 2022-12-11

## 2022-12-11 RX ADMIN — HYDROCODONE BITARTRATE AND ACETAMINOPHEN 1 TABLET: 5; 325 TABLET ORAL at 23:21

## 2022-12-11 RX ADMIN — PREDNISONE 40 MG: 20 TABLET ORAL at 23:21

## 2022-12-11 RX ADMIN — KETOROLAC TROMETHAMINE 30 MG: 30 INJECTION, SOLUTION INTRAMUSCULAR at 21:19

## 2022-12-11 RX ADMIN — OXYCODONE AND ACETAMINOPHEN 1 TABLET: 5; 325 TABLET ORAL at 21:15

## 2022-12-11 RX ADMIN — ORPHENADRINE CITRATE 60 MG: 30 INJECTION INTRAMUSCULAR; INTRAVENOUS at 21:20

## 2022-12-11 ASSESSMENT — PAIN SCALES - GENERAL
PAINLEVEL_OUTOF10: 10
PAINLEVEL_OUTOF10: 8
PAINLEVEL_OUTOF10: 6

## 2022-12-11 ASSESSMENT — PAIN DESCRIPTION - LOCATION
LOCATION: BACK

## 2022-12-11 ASSESSMENT — PAIN DESCRIPTION - DESCRIPTORS
DESCRIPTORS: ACHING;DISCOMFORT
DESCRIPTORS: SHARP;DISCOMFORT
DESCRIPTORS: SHARP

## 2022-12-11 ASSESSMENT — PAIN - FUNCTIONAL ASSESSMENT: PAIN_FUNCTIONAL_ASSESSMENT: 0-10

## 2022-12-11 ASSESSMENT — PAIN DESCRIPTION - ORIENTATION
ORIENTATION: RIGHT;LOWER
ORIENTATION: LOWER;RIGHT

## 2022-12-11 ASSESSMENT — PAIN DESCRIPTION - PAIN TYPE: TYPE: ACUTE PAIN

## 2022-12-11 ASSESSMENT — PAIN DESCRIPTION - FREQUENCY: FREQUENCY: CONTINUOUS

## 2022-12-12 NOTE — ED PROVIDER NOTES
[facility]  Department of Emergency Medicine   ED  Encounter Note  Admit Date/RoomTime: 2022  8:25 PM  ED Room: /  NAME: Azar Villeda  : 1975  MRN: [id number]     Chief Complaint:  Back Pain (R lower back pain onset 2 days ago.)    HISTORY OF PRESENT ILLNESS        Azar Villeda is a 50 y.o. female who presents to the ED via private vehicle for lower lumbar back pain beginning 4 days ago. States that  Setswana-speaking patient and  was used for the entirety of the HPI. She states while sitting in a chair having a hair treatment done she noticed discomfort in her lower lumbar spine. Since then it is gotten progressively worse now radiating into her legs with the right being more severe. Radiates into the right knee. She denies bowel or bladder dysfunction. No saddle anesthesia. She has no leg weakness. She has not tried any over-the-counter analgesics. She is not had any fever chills or generalized myalgias. No burning with urination, hematuria, frequency. No history of back problems. No history of IV drug use. She does have a past medical history significant for diabetes well controlled with metformin, follows a good diet. Pt has been using AZO as a friend told her it was probably her kidneys though she had not been having any urinary symptoms. pertinent positives and negatives are stated within HPI, all other systems reviewed and are negative. Past Medical History:  has a past medical history of Diabetes ([address] Endometrial polyp, Essential hypertension, GERD (gastroesophageal reflux disease), Hyperlipidemia, and Irregular menses. Surgical History:  has a past surgical history that includes [facility] STEREO BREAST BX W LOC DEVICE 1ST LESION LEFT (Left, 2021); Upper gastrointestinal endoscopy (N/A, 3/11/2022); Colonoscopy (N/A, 3/11/2022); and Dilation and curettage of uterus (N/A, 2022).     Social History:  reports that she has never smoked. She has never used smokeless tobacco. She reports that she does not drink alcohol and does not use drugs. Family History: family history includes Diabetes in her father and mother; High Blood Pressure in her mother. Allergies: Patient has no known allergies. PHYSICAL EXAM   Oxygen Saturation Interpretation: Normal on room air analysis. ED Triage Vitals [12/11/22 1908]   BP Temp Temp Source Heart Rate Resp SpO2 Height Weight   (!) 181/108 98.1 °F (36.7 °C) Temporal (!) 103 16 99 % 5' 1\" (1.549 m) 138 lb (62.6 kg)         Physical Exam  Constitutional/General: Alert and oriented x3, well appearing, non toxic but in moderate distress secondary to her discomfort  HEENT:  NC/NT. PERRLA,  Airway patent. Neck: Supple, full ROM, non tender to palpation in the midline, no stridor, no crepitus, no meningeal signs  Respiratory: Lungs clear to auscultation bilaterally, no wheezes, rales, or rhonchi. Not in respiratory distress  CV:  Regular rate. Regular rhythm. No murmurs, gallops, or rubs. 2+ distal pulses  GI:  Abdomen Soft, Non tender, Non distended. Musculoskeletal: Moves all extremities x 4. Warm and well perfused, no clubbing, cyanosis, or edema. Capillary refill <3 seconds  Her back demonstrates diffuse lower lumbar discomfort, more prominent in the SI areas, without any point vertebral tenderness or step-offs. Integument: skin warm and dry. No rashes. Lymphatic: no lymphadenopathy noted  Neurologic: GCS 15, no focal deficits, symmetric strength 5/5 in the upper and lower extremities bilaterally. She has symmetrical 2+ patellar reflexes. She has full flexion and extension at knees ankles toes. She has good strength against resistance in both of her thighs. Her sensation is equal on both the medial and lateral lateral aspects of her upper and lower legs, normal gait.   Psychiatric: Normal Affect    Lab / Imaging Results   (All laboratory and radiology results have been personally reviewed by myself)  Labs:  Results for orders placed or performed during the hospital encounter of 12/11/22   COVID-19, Rapid    Specimen: Nasopharyngeal Swab   Result Value Ref Range    SARS-CoV-2, NAAT Not Detected Not Detected   Rapid influenza A/B antigens    Specimen: Nasopharyngeal   Result Value Ref Range    Influenza A by PCR Not Detected Not Detected    Influenza B by PCR Not Detected Not Detected   Urinalysis with Microscopic   Result Value Ref Range    Color, UA ORANGE (A) Straw/Yellow    Clarity, UA Clear Clear    Glucose, Ur 250 (A) Negative mg/dL    Bilirubin Urine Negative Negative    Ketones, Urine Negative Negative mg/dL    Specific Gravity, UA <=1.005 1.005 - 1.030    Blood, Urine Negative Negative    pH, UA 6.0 5.0 - 9.0    Protein, UA TRACE Negative mg/dL    Urobilinogen, Urine 2.0 (A) <2.0 E.U./dL    Nitrite, Urine POSITIVE (A) Negative    Leukocyte Esterase, Urine Negative Negative    WBC, UA NONE 0 - 5 /HPF    RBC, UA NONE 0 - 2 /HPF    Bacteria, UA NONE SEEN None Seen /HPF   POC Pregnancy Urine Qual   Result Value Ref Range    HCG, Urine, POC Negative Negative    Lot Number [id number]     Positive QC Pass/Fail Pass     Negative QC Pass/Fail Pass      Imaging: All Radiology results interpreted by Radiologist unless otherwise noted. XR HIP BILATERAL W AP PELVIS (2 VIEWS)   Final Result   No acute abnormality of the hips. CT LUMBAR SPINE WO CONTRAST   Final Result   Minimal lumbar dextroscoliosis. Mild bony and discogenic degenerative changes at L3-4, L4-5, L5-S1.              ED Course / Medical Decision Making     Medications   predniSONE (DELTASONE) tablet 40 mg (has no administration in time range)   HYDROcodone-acetaminophen (NORCO) 5-325 MG per tablet 1 tablet (has no administration in time range)   ketorolac (TORADOL) injection 30 mg (30 mg IntraMUSCular Given 12/11/22 2119)   orphenadrine (NORFLEX) injection 60 mg (60 mg IntraMUSCular Given 12/11/22 2120) oxyCODONE-acetaminophen (PERCOCET) 5-325 MG per tablet 1 tablet (1 tablet Oral Given 12/11/22 2115)     ED Course as of 12/11/22 2319   Sun Dec 11, 2022   2052 Patient initially seen and evaluated by myself and imaging and pain medication ordered. This is pending at the time of signout to Sommer Jones PA-C at 8:53 PM [LV]      ED Course User Index  [LV] Breonna Willard APRN - CNP     Re-examination:  12/11/22       Time: pt has pain relief with medications, from 9/10 to 5/10. Consult(s):   None    Procedure(s):   None    MDM:   Patient presents to emergency with lower lumbar pain sometimes radiating into the right lower extremity about to the knee area. No prior history of back problems, no recent traumas or injuries. No history of IV drug abuse. No loss of bowel or bladder control, no saddle anesthesia, no numbness or weakness in either lower extremity. Patient has been trying Azo because a friend told her it might be a kidney infection though she reports she was having no abdominal pain or urinary symptoms, in addition the Azo was not helping. Patient had not tried any medications over-the-counter. Physical exam showing reproducible tenderness across the lower back bilaterally right greater than left most of the tenderness in the SI region and lower paravertebral musculature. Normal neurologic exam.  Urinalysis negative for infection, COVID and influenza negative, imaging showing degenerative changes of the lower lumbar segments. Plan for pain control, steroids, muscle relaxers. Advised patient follow-up with primary care 3 to 5 days for recheck and possible referral to physical therapy or MRI as needed.  services used for entire visit. Plan of Care/Counseling:  Tony Chamorro PA-C reviewed today's visit with the patient and spouse / life partner in addition to providing specific details for the plan of care and counseling regarding the diagnosis and prognosis.   Questions are answered at this time and are agreeable with the plan. ASSESSMENT     1. Acute bilateral low back pain with right-sided sciatica      PLAN   Discharged home. Patient condition is stable    New Medications     New Prescriptions    HYDROCODONE-ACETAMINOPHEN (NORCO) 5-325 MG PER TABLET    Take 1 tablet by mouth every 6 hours as needed for Pain for up to 3 days. IBUPROFEN (ADVIL;MOTRIN) 600 MG TABLET    Take 1 tablet by mouth every 6 hours as needed for Pain    METHOCARBAMOL (ROBAXIN) 750 MG TABLET    Take 1 tablet by mouth 4 times daily for 5 days    METHYLPREDNISOLONE (MEDROL, KHURRAM,) 4 MG TABLET    Take as directed on package insert days 1-6     Electronically signed by Catalino Ferrer PA-C   DD: 12/11/22  **This report was transcribed using voice recognition software. Every effort was made to ensure accuracy; however, inadvertent computerized transcription errors may be present.   END OF ED PROVIDER NOTE      Catalino Ferrer PA-C  12/12/22 [id number]

## 2022-12-12 NOTE — PROGRESS NOTES
CLINICAL PHARMACY NOTE: MEDS TO BEDS    Total # of Prescriptions Filled: 4   The following medications were delivered to the patient:  Methylprednisolone 4 mg   Methocarbamol 750 mg  Ibuprofen 600 mg  Hydrocodone-acetaminophen 5-325    Additional Documentation:

## 2022-12-28 ENCOUNTER — SOCIAL WORK (OUTPATIENT)
Dept: INTERNAL MEDICINE | Age: 47
End: 2022-12-28

## 2022-12-28 ENCOUNTER — OFFICE VISIT (OUTPATIENT)
Dept: INTERNAL MEDICINE | Age: 47
End: 2022-12-28

## 2022-12-28 VITALS
HEIGHT: 61 IN | WEIGHT: 144 LBS | RESPIRATION RATE: 20 BRPM | BODY MASS INDEX: 27.19 KG/M2 | OXYGEN SATURATION: 98 % | DIASTOLIC BLOOD PRESSURE: 76 MMHG | HEART RATE: 70 BPM | TEMPERATURE: 98.8 F | SYSTOLIC BLOOD PRESSURE: 140 MMHG

## 2022-12-28 DIAGNOSIS — E78.00 PURE HYPERCHOLESTEROLEMIA: ICD-10-CM

## 2022-12-28 DIAGNOSIS — Z59.89 INSURANCE COVERAGE PROBLEMS: ICD-10-CM

## 2022-12-28 DIAGNOSIS — E11.9 TYPE 2 DIABETES MELLITUS WITHOUT COMPLICATION, WITHOUT LONG-TERM CURRENT USE OF INSULIN (HCC): Primary | ICD-10-CM

## 2022-12-28 DIAGNOSIS — I10 PRIMARY HYPERTENSION: ICD-10-CM

## 2022-12-28 LAB — HBA1C MFR BLD: 7.9 %

## 2022-12-28 PROCEDURE — 99212 OFFICE O/P EST SF 10 MIN: CPT | Performed by: INTERNAL MEDICINE

## 2022-12-28 PROCEDURE — 83036 HEMOGLOBIN GLYCOSYLATED A1C: CPT | Performed by: INTERNAL MEDICINE

## 2022-12-28 RX ORDER — LOSARTAN POTASSIUM AND HYDROCHLOROTHIAZIDE 12.5; 5 MG/1; MG/1
1 TABLET ORAL DAILY
Qty: 90 TABLET | Refills: 1 | Status: CANCELLED | OUTPATIENT
Start: 2022-12-28

## 2022-12-28 RX ORDER — SITAGLIPTIN AND METFORMIN HYDROCHLORIDE 1000; 50 MG/1; MG/1
1 TABLET, FILM COATED, EXTENDED RELEASE ORAL DAILY
Qty: 90 TABLET | Refills: 1 | Status: CANCELLED | OUTPATIENT
Start: 2022-12-28

## 2022-12-28 RX ORDER — SITAGLIPTIN 100 MG/1
100 TABLET, FILM COATED ORAL DAILY
Qty: 90 TABLET | Refills: 3 | Status: SHIPPED | OUTPATIENT
Start: 2022-12-28

## 2022-12-28 RX ORDER — ATORVASTATIN CALCIUM 40 MG/1
TABLET, FILM COATED ORAL
Qty: 90 TABLET | Refills: 1 | Status: SHIPPED | OUTPATIENT
Start: 2022-12-28

## 2022-12-28 RX ORDER — LOSARTAN POTASSIUM AND HYDROCHLOROTHIAZIDE 25; 100 MG/1; MG/1
1 TABLET ORAL DAILY
Qty: 90 TABLET | Refills: 1 | Status: SHIPPED | OUTPATIENT
Start: 2022-12-28

## 2022-12-28 SDOH — ECONOMIC STABILITY - INCOME SECURITY: OTHER PROBLEMS RELATED TO HOUSING AND ECONOMIC CIRCUMSTANCES: Z59.89

## 2022-12-28 ASSESSMENT — ENCOUNTER SYMPTOMS
DIARRHEA: 0
CONSTIPATION: 0
ABDOMINAL PAIN: 0
NAUSEA: 0
SHORTNESS OF BREATH: 0
VOMITING: 0
COUGH: 0
BACK PAIN: 0
BLOOD IN STOOL: 0

## 2022-12-28 NOTE — PROGRESS NOTES
[facility] Internal Medicine      SUBJECTIVE:  Lisa Brasher (:  1975) is a 50 y.o. female here for evaluation of the following chief complaint(s):  6 Month Follow-Up, Diabetes, and Hypertension  Ipad  # [id number]    DM2: Patient not taking Metformin 1000 mg 2 times daily as prescribed due to intolerance. She said she feels tired, shaking body, sweating. She cut down to 500 mg BID and feels better. She checks BG at home and reading around 130s -135s, highest reading around 150s before breakfast. She denies hypoglycemic symptoms. Patient trying to cut down on carbohydrate, she eats out once a week. She does not exercise daily recently due to the weather. She reported having eye exam recently, not follow up with podiatry. HTN: She takes all of her medications daily. No headache, dizziness, chest pain or SOB or leg swelling noted. She checks BP at home. BP average around 140 - 130s, 135s. HLD: she takes Lipitor 40 mg daily. HM: Repeat Lipid panel. She declined Flu vaccine today. Review of Systems   Constitutional:  Negative for chills and fever. HENT:  Negative for congestion. Respiratory:  Negative for cough and shortness of breath. Cardiovascular:  Negative for chest pain, palpitations and leg swelling. Gastrointestinal:  Negative for abdominal pain, blood in stool, constipation, diarrhea, nausea and vomiting. Musculoskeletal:  Negative for back pain and myalgias. Skin:  Negative for rash. Neurological:  Negative for dizziness and headaches. Psychiatric/Behavioral:  The patient is not nervous/anxious.       Current Outpatient Medications on File Prior to Visit   Medication Sig Dispense Refill    ibuprofen (ADVIL;MOTRIN) 600 MG tablet Take 1 tablet by mouth every 6 hours as needed for Pain 28 tablet 0    atorvastatin (LIPITOR) 40 MG tablet TAKE 1 TABLET BY MOUTH ONE TIME A DAY 90 tablet 1    metFORMIN (GLUCOPHAGE) 1000 MG tablet Take 1 tablet by mouth 2 times daily (with meals) (Patient taking differently: Take 1,000 mg by mouth 2 times daily (with meals) Taking 500 mg twice daily. Decreased from 2000 mg daily) 60 tablet 3    losartan-hydroCHLOROthiazide (HYZAAR) 50-12.5 MG per tablet Take 1 tablet by mouth daily 90 tablet 1    blood glucose test strips (AGAMATRIX AMP TEST) strip Pt to check BS daily. 100 each 3    AGAMATRIX ULTRA-THIN LANCETS MISC Check BS daily 100 each 3    Lancet Device MISC Pt to check BS daily 100 Device 3    Blood Glucose Monitoring Suppl (AGAMATRIX AMP) SREE 1 glucometer 1 Device 0    Flibanserin (ADDYI) 100 MG TABS Take 1 tablet by mouth daily (Patient not taking: No sig reported) 30 tablet 1    Blood Pressure KIT Check blood pressure daily 1 kit 0     No current facility-administered medications on file prior to visit. OBJECTIVE:    VS:   Vitals:    12/28/22 1401   BP: (!) 140/76   Site: Right Upper Arm   Position: Sitting   Cuff Size: Medium Adult   Pulse: 70   Resp: 20   Temp: 98.8 °F (37.1 °C)   TempSrc: Temporal   SpO2: 98%   Weight: 144 lb (65.3 kg)   Height: 5' 1\" (1.549 m)     Physical Exam  Constitutional:       General: She is not in acute distress. Appearance: She is well-developed. HENT:      Head: Normocephalic and atraumatic. Eyes:      General: No scleral icterus. Conjunctiva/sclera: Conjunctivae normal.   Neck:      Trachea: No tracheal deviation. Cardiovascular:      Rate and Rhythm: Normal rate. Heart sounds: No murmur heard. Pulmonary:      Effort: Pulmonary effort is normal. No respiratory distress. Breath sounds: Normal breath sounds. Abdominal:      General: Bowel sounds are normal. There is no distension. Palpations: Abdomen is soft. Tenderness: There is no abdominal tenderness. Musculoskeletal:         General: Normal range of motion. Cervical back: Normal range of motion. Skin:     General: Skin is warm and dry.    Neurological:      Mental Status: She is alert and oriented to person, place, and time. Psychiatric:         Behavior: Behavior normal.         Thought Content: Thought content normal.         Judgment: Judgment normal.          ASSESSMENT/PLAN:  1. Type 2 diabetes mellitus without complication, without long-term current use of insulin ([facility])   HbA1c 7.9 today ( increased from 7.5)    Will add Januvia 100 mg daily. Continue Metformin 500 mg BID  -     atorvastatin (LIPITOR) 40 MG tablet; TAKE 1 TABLET BY MOUTH ONE TIME A DAY, Disp-90 tablet, R-1Normal  -     POCT glycosylated hemoglobin (Hb A1C)  -     LIPID PANEL; Future  -     Comprehensive Metabolic Panel; Future    2. Primary hypertension   BP still > 130/80   Will increase Losartan HCTZ to 100 - 25 mg daily. -     losartan-hydroCHLOROthiazide (HYZAAR) 100-25 MG per tablet; Take 1 tablet by mouth daily, Disp-90 tablet, R-1Normal  -     Comprehensive Metabolic Panel; Future    3. Pure hypercholesterolemia   Continue Lipitor 40 mg daily    4. Insurance coverage problems  -     [facility] Referral to Social Work (Primary Care Only)     RTC:  Return in about 3 months (around 3/28/2023) for Follow up. I have reviewed my findings and recommendations with Sully Manning and Dr. Fartun Davis.     Karishma Lockett MD   12/28/2022 1:55 PM

## 2022-12-28 NOTE — PROGRESS NOTES
[facility]  Attending Physician Statement:  Edger Brunner, M.D., F.A.C.P. I have seen/discussed the case, including pertinent history and exam findings with the resident. I agree with the assessment, plan and orders as documented by the resident. Patient is seen for fu visit today. -- acute and chronic problems addressed  Remainder of medical problems as per resident note. Last office notes reviewed, relative labs and imaging. Health maintenance issues of vaccinations, depression screening, tobacco cessation   Billiing assessed by time spent with review of medical records, time spent coordinating care with residents, nurses and patient  +medical complexity of case-- 30min  Dm2-- aic 7.5 in june  Metformin 1000mg not tolerated GI intolerance -- better at 500 dose  -- looking into PAP? Gliptan vs inexpensive use of sulfonlylurea  Add on  - siocial worder- issues      Hx of endometrial polyp -- s/p resection with resumption normal menses--Pap smear unremarkable (2/22)  Hx of Hypoactive sequal disorder  Patient not taking filibanserin any more   \". ..clinical role of flibanserin may be limited by the need for daily dosing, common adverse effects (eg, somnolence, dizziness)\"    BP (!) 140/76 (Site: Right Upper Arm, Position: Sitting, Cuff Size: Medium Adult)   Pulse 70   Temp 98.8 °F (37.1 °C) (Temporal)   Resp 20   Ht 5' 1\" (1.549 m)   Wt 144 lb (65.3 kg)   SpO2 98%   BMI 27.21 kg/m²   HTN elevated bp - inc bmi 27  Will double BP med combo dose    Lipid panel good 64 and 60 in past on statin  Dm2 doubt diabetic neuropathic complaints  Pinched nerve complaints in past  CT cervical spine revealing short left C7 rib which may result in thoracic outlet syndrome, small central disc protrusion at C5-C6, 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 Mild bony and discogenic degenerative changes at L3-4, L4-5, L5-S1.

## 2022-12-28 NOTE — PROGRESS NOTES
Consult from Dr Saige Donis during 12.28.22 IM appt due to financial concerns related to being uninsured and in need of escalation for diabetes medication (Januvia)  Reviewed note from 3/22 which pt did not return call re: public benefits recommendation to apply for [facility]  Enconterer completed thru [employer] [address] [employer] video  Breezy Strong #[id number]). Advised pt of PBS recommendation which she would like [facility] assistance in applying for Medicaid and SNAP via paper application which was completed as well as copies of permanent residency card, social security card and [place] drivers license; application faxed and confirmed to [facility] contacted [facility] PAP who  completed application for PAP Januvia in the interim  of Medicaid approval and Dr Romario Smith signed application and returned to [facility]. Pt appreciative of assistance.

## 2023-01-06 ENCOUNTER — TELEPHONE (OUTPATIENT)
Dept: INTERNAL MEDICINE | Age: 48
End: 2023-01-06

## 2023-01-06 NOTE — TELEPHONE ENCOUNTER
Impression: Vitreous degeneration, bilateral: H43.813. Plan: Warning signs of retinal tear and detachment discussed with patient. To return to clinic STAT if any change in symptoms consistent with RT or RD. LSW received call from pt and returned call utilizing [employer] phone  #[phone]  Pt reports she received letter from [facility] advising of receipt of application and need to call in for phone interview; encouraged pt to complete this and will be able to choose option to have  to complete.  Also mailed pt copy of Medicaid application w fax confirmation to have as reference

## 2023-01-16 ENCOUNTER — TELEPHONE (OUTPATIENT)
Dept: INTERNAL MEDICINE | Age: 48
End: 2023-01-16

## 2023-01-16 DIAGNOSIS — Z12.31 ENCOUNTER FOR SCREENING MAMMOGRAM FOR MALIGNANT NEOPLASM OF BREAST: Primary | ICD-10-CM

## 2023-03-13 ENCOUNTER — HOSPITAL ENCOUNTER (OUTPATIENT)
Dept: GENERAL RADIOLOGY | Age: 48
Discharge: HOME | End: 2023-03-15

## 2023-03-13 VITALS — WEIGHT: 143 LBS | HEIGHT: 60 IN | BODY MASS INDEX: 28.07 KG/M2

## 2023-03-13 DIAGNOSIS — Z12.31 ENCOUNTER FOR SCREENING MAMMOGRAM FOR MALIGNANT NEOPLASM OF BREAST: ICD-10-CM

## 2023-03-13 PROCEDURE — 77067 SCR MAMMO BI INCL CAD: CPT

## 2023-03-31 ENCOUNTER — OFFICE VISIT (OUTPATIENT)
Dept: INTERNAL MEDICINE | Age: 48
End: 2023-03-31

## 2023-03-31 VITALS
DIASTOLIC BLOOD PRESSURE: 64 MMHG | WEIGHT: 144 LBS | HEIGHT: 61 IN | TEMPERATURE: 97 F | SYSTOLIC BLOOD PRESSURE: 130 MMHG | HEART RATE: 80 BPM | BODY MASS INDEX: 27.19 KG/M2 | RESPIRATION RATE: 18 BRPM | OXYGEN SATURATION: 97 %

## 2023-03-31 DIAGNOSIS — I10 PRIMARY HYPERTENSION: ICD-10-CM

## 2023-03-31 DIAGNOSIS — E11.9 TYPE 2 DIABETES MELLITUS WITHOUT COMPLICATION, WITHOUT LONG-TERM CURRENT USE OF INSULIN (HCC): Primary | ICD-10-CM

## 2023-03-31 DIAGNOSIS — E78.00 PURE HYPERCHOLESTEROLEMIA: ICD-10-CM

## 2023-03-31 DIAGNOSIS — K21.9 GASTROESOPHAGEAL REFLUX DISEASE WITHOUT ESOPHAGITIS: ICD-10-CM

## 2023-03-31 LAB — HBA1C MFR BLD: 7.8 %

## 2023-03-31 PROCEDURE — 3074F SYST BP LT 130 MM HG: CPT | Performed by: INTERNAL MEDICINE

## 2023-03-31 PROCEDURE — 3051F HG A1C>EQUAL 7.0%<8.0%: CPT | Performed by: INTERNAL MEDICINE

## 2023-03-31 PROCEDURE — 99213 OFFICE O/P EST LOW 20 MIN: CPT | Performed by: INTERNAL MEDICINE

## 2023-03-31 PROCEDURE — 99212 OFFICE O/P EST SF 10 MIN: CPT | Performed by: INTERNAL MEDICINE

## 2023-03-31 PROCEDURE — 83036 HEMOGLOBIN GLYCOSYLATED A1C: CPT | Performed by: INTERNAL MEDICINE

## 2023-03-31 PROCEDURE — 3078F DIAST BP <80 MM HG: CPT | Performed by: INTERNAL MEDICINE

## 2023-03-31 RX ORDER — ATORVASTATIN CALCIUM 40 MG/1
TABLET, FILM COATED ORAL
Qty: 90 TABLET | Refills: 1 | Status: SHIPPED | OUTPATIENT
Start: 2023-03-31

## 2023-03-31 RX ORDER — PANTOPRAZOLE SODIUM 20 MG/1
20 TABLET, DELAYED RELEASE ORAL
Qty: 90 TABLET | Refills: 0 | Status: SHIPPED | OUTPATIENT
Start: 2023-03-31

## 2023-03-31 SDOH — ECONOMIC STABILITY: HOUSING INSECURITY
IN THE LAST 12 MONTHS, WAS THERE A TIME WHEN YOU DID NOT HAVE A STEADY PLACE TO SLEEP OR SLEPT IN A SHELTER (INCLUDING NOW)?: NO

## 2023-03-31 SDOH — ECONOMIC STABILITY: INCOME INSECURITY: IN THE LAST 12 MONTHS, WAS THERE A TIME WHEN YOU WERE NOT ABLE TO PAY THE MORTGAGE OR RENT ON TIME?: NO

## 2023-03-31 SDOH — ECONOMIC STABILITY: INCOME INSECURITY: HOW HARD IS IT FOR YOU TO PAY FOR THE VERY BASICS LIKE FOOD, HOUSING, MEDICAL CARE, AND HEATING?: NOT HARD AT ALL

## 2023-03-31 SDOH — ECONOMIC STABILITY: TRANSPORTATION INSECURITY
IN THE PAST 12 MONTHS, HAS THE LACK OF TRANSPORTATION KEPT YOU FROM MEDICAL APPOINTMENTS OR FROM GETTING MEDICATIONS?: NO

## 2023-03-31 SDOH — ECONOMIC STABILITY: FOOD INSECURITY: WITHIN THE PAST 12 MONTHS, YOU WORRIED THAT YOUR FOOD WOULD RUN OUT BEFORE YOU GOT MONEY TO BUY MORE.: NEVER TRUE

## 2023-03-31 SDOH — ECONOMIC STABILITY: HOUSING INSECURITY: IN THE LAST 12 MONTHS, HOW MANY PLACES HAVE YOU LIVED?: 1

## 2023-03-31 SDOH — ECONOMIC STABILITY: FOOD INSECURITY: WITHIN THE PAST 12 MONTHS, THE FOOD YOU BOUGHT JUST DIDN'T LAST AND YOU DIDN'T HAVE MONEY TO GET MORE.: NEVER TRUE

## 2023-03-31 SDOH — ECONOMIC STABILITY: TRANSPORTATION INSECURITY
IN THE PAST 12 MONTHS, HAS LACK OF TRANSPORTATION KEPT YOU FROM MEETINGS, WORK, OR FROM GETTING THINGS NEEDED FOR DAILY LIVING?: NO

## 2023-03-31 ASSESSMENT — ENCOUNTER SYMPTOMS
COUGH: 0
ABDOMINAL PAIN: 0
WHEEZING: 0
BACK PAIN: 0
CHEST TIGHTNESS: 0
SINUS PRESSURE: 0
SORE THROAT: 0
SINUS PAIN: 0
SHORTNESS OF BREATH: 0
BLOOD IN STOOL: 0
NAUSEA: 0
DIARRHEA: 0

## 2023-03-31 ASSESSMENT — PATIENT HEALTH QUESTIONNAIRE - PHQ9
SUM OF ALL RESPONSES TO PHQ QUESTIONS 1-9: 0
2. FEELING DOWN, DEPRESSED OR HOPELESS: 0
SUM OF ALL RESPONSES TO PHQ9 QUESTIONS 1 & 2: 0
SUM OF ALL RESPONSES TO PHQ QUESTIONS 1-9: 0
1. LITTLE INTEREST OR PLEASURE IN DOING THINGS: 0

## 2023-03-31 ASSESSMENT — LIFESTYLE VARIABLES: HOW OFTEN DO YOU HAVE A DRINK CONTAINING ALCOHOL: NEVER

## 2023-03-31 NOTE — PROGRESS NOTES
[facility]  Internal Medicine Residency Clinic    Attending Physician Statement  I have discussed the case, including pertinent history and exam findings with the resident physician. I agree with the assessment, plan and orders as documented by the resident. I have reviewed all pertinent PMHx, PSHx, FamHx, SocialHx, medications, and allergies and updated history as appropriate. Patient here for routine follow up of medical problems. NIDDM2  -A1c 7.8 today; FBG 130s  -plan to continue current regimen   -patient is intolerant to increased dosages   -discussed dietary and exercise modifications today   -screening blood work ordered     HTN  -controlled; The current medical regimen is effective;  continue present plan and medications. HLD: continue statin   The ASCVD Risk score ([place] DK, et al., 2019) failed to calculate for the following reasons: The valid total cholesterol range is 130 to 320 mg/dL    Unable to determine if patient is Non-     GERD  -symptomatic   -plan to continue PPI for 6 weeks     HCM  -patient not interested in vaccines at this time     Remainder of medical problems as per resident note.     [address] DO Rosa  3/31/2023 10:51 AM    Encounter time including independent chart review, discussion with patient, interpreting test results and/or external communications: 30'
Cardiovascular:      Rate and Rhythm: Normal rate and regular rhythm. Pulses: Normal pulses. Dorsalis pedis pulses are 2+ on the right side and 2+ on the left side. Posterior tibial pulses are 2+ on the right side and 2+ on the left side. Heart sounds: Normal heart sounds. No murmur heard. No friction rub. No gallop. Pulmonary:      Effort: Pulmonary effort is normal. No respiratory distress. Breath sounds: Normal breath sounds. No stridor. No wheezing, rhonchi or rales. Chest:      Chest wall: No tenderness. Abdominal:      General: Abdomen is flat. Bowel sounds are normal. There is no distension. Palpations: Abdomen is soft. There is no mass. Tenderness: There is no abdominal tenderness. There is no guarding or rebound. Hernia: No hernia is present. Musculoskeletal:         General: No swelling, tenderness, deformity or signs of injury. Normal range of motion. Cervical back: Normal range of motion. Feet:      Right foot:      Protective Sensation: 5 sites tested. 5 sites sensed. Left foot:      Protective Sensation: 5 sites tested. 5 sites sensed. Skin:     General: Skin is warm. Capillary Refill: Capillary refill takes less than 2 seconds. Coloration: Skin is not jaundiced or pale. Neurological:      General: No focal deficit present. Mental Status: She is alert and oriented to person, place, and time. Motor: No weakness. Gait: Gait normal.   Psychiatric:         Mood and Affect: Mood normal.         Thought Content: Thought content normal.          ASSESSMENT/PLAN:  1. Type 2 diabetes mellitus without complication, without long-term current use of insulin ([facility])  -      DIABETES FOOT EXAM  -     POCT glycosylated hemoglobin (Hb A1C)  -     Microalbumin / Creatinine Urine Ratio; Future  -     metFORMIN (GLUCOPHAGE) 500 MG tablet;  Take 1 tablet by mouth 2 times daily (with meals), Disp-180 tablet, R-1Normal  -

## 2023-03-31 NOTE — PATIENT INSTRUCTIONS
It was a pleasure seeing you today. Switch to Javier bread. No more [place] bread. It has too many carbohydrates. Cut down on rice. Continue all medications as prescribed. No changes. Please complete blood work and urine test prior to next visit in 3 months.      Have a great day!     -Dr. Valentine Roca

## 2023-07-03 DIAGNOSIS — I10 PRIMARY HYPERTENSION: ICD-10-CM

## 2023-07-03 RX ORDER — LOSARTAN POTASSIUM AND HYDROCHLOROTHIAZIDE 25; 100 MG/1; MG/1
1 TABLET ORAL DAILY
Qty: 90 TABLET | Refills: 1 | Status: SHIPPED | OUTPATIENT
Start: 2023-07-03

## 2023-07-03 NOTE — TELEPHONE ENCOUNTER
Last Appointment:  3/31/2023  Future Appointments   Date Time Provider [address]   7/10/2023  9:30 AM Harman Phillips MD [facility]

## 2023-07-06 ENCOUNTER — HOSPITAL ENCOUNTER (OUTPATIENT)
Age: 48
Discharge: HOME | End: 2023-07-06

## 2023-07-06 DIAGNOSIS — E11.9 TYPE 2 DIABETES MELLITUS WITHOUT COMPLICATION, WITHOUT LONG-TERM CURRENT USE OF INSULIN (HCC): ICD-10-CM

## 2023-07-06 DIAGNOSIS — I10 PRIMARY HYPERTENSION: ICD-10-CM

## 2023-07-06 DIAGNOSIS — E78.00 PURE HYPERCHOLESTEROLEMIA: ICD-10-CM

## 2023-07-06 LAB
ALBUMIN SERPL-MCNC: 4.3 G/DL (ref 3.5–5.2)
ALP SERPL-CCNC: 73 U/L (ref 35–104)
ALT SERPL-CCNC: 17 U/L (ref 0–32)
ANION GAP SERPL CALCULATED.3IONS-SCNC: 11 MMOL/L (ref 7–16)
AST SERPL-CCNC: 20 U/L (ref 0–31)
BILIRUB SERPL-MCNC: 0.7 MG/DL (ref 0–1.2)
BUN SERPL-MCNC: 14 MG/DL (ref 6–20)
CALCIUM SERPL-MCNC: 9.4 MG/DL (ref 8.6–10.2)
CHLORIDE SERPL-SCNC: 103 MMOL/L (ref 98–107)
CHOLESTEROL, TOTAL: 133 MG/DL (ref 0–199)
CO2 SERPL-SCNC: 25 MMOL/L (ref 22–29)
CREAT SERPL-MCNC: 0.6 MG/DL (ref 0.5–1)
CREAT UR-MCNC: 69 MG/DL (ref 29–226)
GLUCOSE SERPL-MCNC: 150 MG/DL (ref 74–99)
HDLC SERPL-MCNC: 50 MG/DL
LDLC SERPL CALC-MCNC: 52 MG/DL (ref 0–99)
MICROALBUMIN UR-MCNC: <12 MG/L
MICROALBUMIN/CREAT UR-RTO: ABNORMAL (ref 0–30)
POTASSIUM SERPL-SCNC: 3.5 MMOL/L (ref 3.5–5)
PROT SERPL-MCNC: 7 G/DL (ref 6.4–8.3)
SODIUM SERPL-SCNC: 139 MMOL/L (ref 132–146)
TRIGL SERPL-MCNC: 156 MG/DL (ref 0–149)
VLDLC SERPL CALC-MCNC: 31 MG/DL

## 2023-07-06 PROCEDURE — 80053 COMPREHEN METABOLIC PANEL: CPT

## 2023-07-06 PROCEDURE — 80061 LIPID PANEL: CPT

## 2023-07-06 PROCEDURE — 36415 COLL VENOUS BLD VENIPUNCTURE: CPT

## 2023-07-06 PROCEDURE — 82044 UR ALBUMIN SEMIQUANTITATIVE: CPT

## 2023-07-06 PROCEDURE — 82570 ASSAY OF URINE CREATININE: CPT

## 2023-07-10 ENCOUNTER — OFFICE VISIT (OUTPATIENT)
Dept: INTERNAL MEDICINE | Age: 48
End: 2023-07-10

## 2023-07-10 VITALS
SYSTOLIC BLOOD PRESSURE: 115 MMHG | TEMPERATURE: 97.9 F | HEART RATE: 78 BPM | RESPIRATION RATE: 18 BRPM | WEIGHT: 143 LBS | HEIGHT: 61 IN | DIASTOLIC BLOOD PRESSURE: 71 MMHG | BODY MASS INDEX: 27 KG/M2

## 2023-07-10 DIAGNOSIS — K21.9 GASTROESOPHAGEAL REFLUX DISEASE WITHOUT ESOPHAGITIS: ICD-10-CM

## 2023-07-10 DIAGNOSIS — I10 PRIMARY HYPERTENSION: ICD-10-CM

## 2023-07-10 DIAGNOSIS — M54.50 CHRONIC LOW BACK PAIN, UNSPECIFIED BACK PAIN LATERALITY, UNSPECIFIED WHETHER SCIATICA PRESENT: ICD-10-CM

## 2023-07-10 DIAGNOSIS — E11.9 TYPE 2 DIABETES MELLITUS WITHOUT COMPLICATION, WITHOUT LONG-TERM CURRENT USE OF INSULIN (HCC): Primary | ICD-10-CM

## 2023-07-10 DIAGNOSIS — G89.29 CHRONIC LOW BACK PAIN, UNSPECIFIED BACK PAIN LATERALITY, UNSPECIFIED WHETHER SCIATICA PRESENT: ICD-10-CM

## 2023-07-10 DIAGNOSIS — E78.00 PURE HYPERCHOLESTEROLEMIA: ICD-10-CM

## 2023-07-10 LAB — HBA1C MFR BLD: 7.5 %

## 2023-07-10 PROCEDURE — 99213 OFFICE O/P EST LOW 20 MIN: CPT

## 2023-07-10 PROCEDURE — 99212 OFFICE O/P EST SF 10 MIN: CPT

## 2023-07-10 PROCEDURE — 3078F DIAST BP <80 MM HG: CPT

## 2023-07-10 PROCEDURE — 3051F HG A1C>EQUAL 7.0%<8.0%: CPT

## 2023-07-10 PROCEDURE — 83037 HB GLYCOSYLATED A1C HOME DEV: CPT

## 2023-07-10 PROCEDURE — 3074F SYST BP LT 130 MM HG: CPT

## 2023-07-10 RX ORDER — ZOSTER VACCINE RECOMBINANT, ADJUVANTED 50 MCG/0.5
0.5 KIT INTRAMUSCULAR SEE ADMIN INSTRUCTIONS
Qty: 0.5 ML | Refills: 0 | Status: CANCELLED | OUTPATIENT
Start: 2023-07-10 | End: 2024-01-06

## 2023-07-10 RX ORDER — PANTOPRAZOLE SODIUM 20 MG/1
20 TABLET, DELAYED RELEASE ORAL
Qty: 90 TABLET | Refills: 0 | Status: SHIPPED | OUTPATIENT
Start: 2023-07-10

## 2023-07-10 RX ORDER — SITAGLIPTIN 100 MG/1
100 TABLET, FILM COATED ORAL DAILY
Qty: 90 TABLET | Refills: 3 | Status: SHIPPED | OUTPATIENT
Start: 2023-07-10

## 2023-07-10 NOTE — PATIENT INSTRUCTIONS
Dear Raji Kumar,    Thank you for coming to your appointment today. I hope we have addressed all of your needs. Please make sure to do the following:  - Continue your medications as listed. - Add tums or pepcid if you are getting breakthrough GERD symptoms   - We will see each other again in 5 months    Call for a sooner appointment if you develop worsening acid reflux     Have a great day!     Sincerely,  Adore Ramirez M.D  7/10/2023  10:12 AM

## 2023-07-10 NOTE — PROGRESS NOTES
Discharge instructions reviewed with patient. Patient verbalizes understanding. AVS given.  Scripts e scribed

## 2023-07-10 NOTE — PROGRESS NOTES
[facility]  Internal Medicine Residency Clinic  Attending Physician Statement:  Annalise Monzon M.D., F.A.C.P. Patient is seen for fu visit today. -- acute and chronic problems addressed  Addressed when applicable- Health maintenance issues of vaccinations, social determinants/depression/CA screening, tobacco cessation etc...   I have discussed the case, including pertinent history and exam findings with the resident, review of last visit medical records/labs-   I agree with the assessment, plan and orders as documented by the resident.    -Cristy Rashid assessed by medical complexity of case  My assessment of high points of todays visit as follows:    Dm2 stable- aic 7.8 in march  Gliptan +metformin  Discussed vision exam  Ldl <70  No need for statin    GERD on PPI  Discussed OTC h2ra vs tums for rare breakthru symptoms

## 2023-07-10 NOTE — PROGRESS NOTES
[facility]  Internal Medicine Residency Program  [facility] Note      CC: had concerns including Diabetes (Glucose checks 140 highest) and Hypertension. Ivana Mccloud has a PMHx of DMT2, HTN, HLD, GERD presented to the [facility] for a routine visit     Interpretor [id number]    7.5% today, improving, no adjustment at this time    /71 no changes made     Pt has no complaints today, was accompanied by her daughter who speaks Abdi Messing and [address]. Patient understands some english but would still advise using interpretor. Health Care Maintenance: Things to follow up:  Repeat Mammogram 3/2024, previously neg for malignancy in 2023  Diabetic foot exam     Recent visits/labs:  LDL 52, microalbumin:Cr undetectably low     Labs and Imaging:       ASSESSMENT/PLAN:  1. Type 2 diabetes mellitus without complication, without long-term current use of insulin ([address])  Overview:  Diabetes since 2016  Microvascular complications - last eye exam follows yearly, last foot exam has been done, neuropathy none  Home medication januvia 100 qd, metformin 500 BID. Patient is compliant. Doestolerates metformin at this level but not higher  Their home reference range is 126-140. Pt has have glucose strips and monitor at home. no episodes of hypoglycemia, pt recommended to have have sugar near them in their nightstand/by couch. Hemoglobin A1C   Date Value Ref Range Status   03/31/2023 7.8 % Final   12/28/2022 7.9 % Final   06/16/2022 7.5 % Final      Lab Results   Component Value Date    LABMICR <12.0 07/06/2023       Orders:  -     POCT glycosylated hemoglobin (Hb A1C)  -     JANUVIA 100 MG tablet; Take 1 tablet by mouth daily PAP, Disp-90 tablet, R-3, DAWNormal  -     metFORMIN (GLUCOPHAGE) 500 MG tablet; Take 1 tablet by mouth 2 times daily (with meals), Disp-180 tablet, R-1Normal  2.  Pure hypercholesterolemia  Overview:  Lab Results   Component Value Date    LDLCALC 52 07/06/2023     The ASCVD Risk score

## 2023-09-27 DIAGNOSIS — E11.9 TYPE 2 DIABETES MELLITUS WITHOUT COMPLICATION, WITHOUT LONG-TERM CURRENT USE OF INSULIN (HCC): ICD-10-CM

## 2023-09-27 RX ORDER — ATORVASTATIN CALCIUM 40 MG/1
TABLET, FILM COATED ORAL
Qty: 90 TABLET | Refills: 1 | Status: SHIPPED | OUTPATIENT
Start: 2023-09-27

## 2023-11-20 ENCOUNTER — TELEPHONE (OUTPATIENT)
Dept: INTERNAL MEDICINE | Age: 48
End: 2023-11-20

## 2023-11-20 NOTE — TELEPHONE ENCOUNTER
Called Pharmacy pt should have enough medication til Dec. I called pt states her medication was lost on airplane . Scheduled her an appt.  11/28/23 with her PCP was last seen in July

## 2023-11-30 ENCOUNTER — OFFICE VISIT (OUTPATIENT)
Dept: INTERNAL MEDICINE | Age: 48
End: 2023-11-30

## 2023-11-30 VITALS
WEIGHT: 145.8 LBS | RESPIRATION RATE: 20 BRPM | DIASTOLIC BLOOD PRESSURE: 78 MMHG | BODY MASS INDEX: 27.53 KG/M2 | TEMPERATURE: 97.4 F | HEART RATE: 83 BPM | HEIGHT: 61 IN | SYSTOLIC BLOOD PRESSURE: 154 MMHG

## 2023-11-30 DIAGNOSIS — I10 PRIMARY HYPERTENSION: ICD-10-CM

## 2023-11-30 DIAGNOSIS — E11.9 TYPE 2 DIABETES MELLITUS WITHOUT COMPLICATION, WITHOUT LONG-TERM CURRENT USE OF INSULIN (HCC): Primary | ICD-10-CM

## 2023-11-30 DIAGNOSIS — Z23 NEED FOR INFLUENZA VACCINATION: ICD-10-CM

## 2023-11-30 LAB — HBA1C MFR BLD: 7.6 %

## 2023-11-30 PROCEDURE — 99212 OFFICE O/P EST SF 10 MIN: CPT

## 2023-11-30 PROCEDURE — G0008 ADMIN INFLUENZA VIRUS VAC: HCPCS | Performed by: INTERNAL MEDICINE

## 2023-11-30 PROCEDURE — 83036 HEMOGLOBIN GLYCOSYLATED A1C: CPT

## 2023-11-30 PROCEDURE — 3074F SYST BP LT 130 MM HG: CPT

## 2023-11-30 PROCEDURE — 3078F DIAST BP <80 MM HG: CPT

## 2023-11-30 PROCEDURE — 3051F HG A1C>EQUAL 7.0%<8.0%: CPT

## 2023-11-30 PROCEDURE — 99213 OFFICE O/P EST LOW 20 MIN: CPT

## 2023-11-30 RX ORDER — LOSARTAN POTASSIUM AND HYDROCHLOROTHIAZIDE 25; 100 MG/1; MG/1
1 TABLET ORAL DAILY
Qty: 90 TABLET | Refills: 1 | Status: SHIPPED | OUTPATIENT
Start: 2023-11-30

## 2023-11-30 RX ORDER — ATORVASTATIN CALCIUM 40 MG/1
TABLET, FILM COATED ORAL
Qty: 90 TABLET | Refills: 1 | Status: SHIPPED | OUTPATIENT
Start: 2023-11-30

## 2023-11-30 ASSESSMENT — ENCOUNTER SYMPTOMS
ABDOMINAL PAIN: 0
CHEST TIGHTNESS: 0
COUGH: 0
DIARRHEA: 0
VOMITING: 0
SHORTNESS OF BREATH: 0
CONSTIPATION: 0
PHOTOPHOBIA: 0
NAUSEA: 0

## 2023-11-30 NOTE — PATIENT INSTRUCTIONS
Dear Elton Maldonado,        Thank you for coming to your appointment today. I hope we have addressed all of your needs. Please make sure to do the following:  - Continue your medications as listed. - We will see each other again in 1 month for BP check    Call for a sooner appointment if you develop new symptoms     Have a great day!         Sincerely,  Scott Hancock M.D  11/30/2023  9:48 AM

## 2024-01-24 ENCOUNTER — OFFICE VISIT (OUTPATIENT)
Dept: OBGYN | Age: 49
End: 2024-01-24

## 2024-01-24 VITALS
HEART RATE: 86 BPM | DIASTOLIC BLOOD PRESSURE: 60 MMHG | WEIGHT: 144.5 LBS | SYSTOLIC BLOOD PRESSURE: 124 MMHG | BODY MASS INDEX: 27.3 KG/M2

## 2024-01-24 DIAGNOSIS — Z12.31 SCREENING MAMMOGRAM, ENCOUNTER FOR: ICD-10-CM

## 2024-01-24 DIAGNOSIS — R92.30 DENSE BREAST TISSUE ON MAMMOGRAM, UNSPECIFIED TYPE: ICD-10-CM

## 2024-01-24 DIAGNOSIS — N76.1 SUBACUTE VAGINITIS: ICD-10-CM

## 2024-01-24 DIAGNOSIS — Z01.419 ENCOUNTER FOR GYNECOLOGICAL EXAMINATION: Primary | ICD-10-CM

## 2024-01-24 PROCEDURE — 99396 PREV VISIT EST AGE 40-64: CPT | Performed by: OBSTETRICS & GYNECOLOGY

## 2024-01-24 PROCEDURE — 3074F SYST BP LT 130 MM HG: CPT | Performed by: OBSTETRICS & GYNECOLOGY

## 2024-01-24 PROCEDURE — 3078F DIAST BP <80 MM HG: CPT | Performed by: OBSTETRICS & GYNECOLOGY

## 2024-01-24 RX ORDER — CLOTRIMAZOLE AND BETAMETHASONE DIPROPIONATE 10; .64 MG/G; MG/G
CREAM TOPICAL
Qty: 45 G | Refills: 0 | Status: SHIPPED | OUTPATIENT
Start: 2024-01-24

## 2024-01-24 NOTE — PROGRESS NOTES
Patient alert and pleasant with no complaints  Here today for annual GYN visit. Betsey Randhawa present during visit for interpretation.  Assisted with pelvic exam, Mimosa Rx specimen obtained, labeled and sent via UPS. Clinical breast exam completed.  Discharge instructions have been discussed with the patient. Patient advised to call our office with any questions or concerns.   Voiced understanding.   
      General appearance: alert, cooperative and in no acute distress.  Head: NCAT   Neck: Neck supple, no mass  Breasts: nontender, no masses palpable bilaterally, no dimpling, no discharge  Lungs: clear to auscultation bilaterally  Heart: regular rate and rhythm, no murmurs  Abdomen: soft, nontender, nondistended, no masses palpable, no rebound tenderness, no guarding or rigidity  Skin: No suspicious lesions  Neurologic: Alert and oriented, no focal deficits  Extremities: symmetrical without clubbing or cynosis  Psych: Alert, oriented, mood/affect full range, no acute distress    Pelvic Exam:   EXTERNAL GENITALIA: normal external genitalia, no external lesions  VAGINA: normal rugae, no discharge   CERVIX: normal appearing, no lesions, no bleeding  UTERUS: uterus is normal size, shape, consistency and nontender   ADNEXA: normal adnexa in size, nontender and no masses.  RECTUM: no hemorrhoids or rectal masses.          ASSESSMENT : Routine Annual Exam,    Diagnosis Orders   1. Encounter for gynecological examination        2. Screening mammogram, encounter for        3. Dense breast tissue on mammogram, unspecified type  US BREAST BILATERAL COMPLETE      4. Subacute vaginitis  Miscellaneous Sendout           PLAN:    Return in about 1 year (around 1/24/2025) for Annual exam.  Repeat pap next year    Orders Placed This Encounter   Medications    clotrimazole-betamethasone (LOTRISONE) 1-0.05 % cream     Sig: Apply topically 2 times daily.     Dispense:  45 g     Refill:  0       Orders Placed This Encounter   Procedures    US BREAST BILATERAL COMPLETE     Standing Status:   Future     Standing Expiration Date:   1/24/2025    Miscellaneous Sendout     Standing Status:   Future     Standing Expiration Date:   1/24/2025     Order Specific Question:   Specify Req. Test (1 Test/Order)     Answer:   health tracks         Electronically signed by Sivan To DO on 1/24/24

## 2024-02-13 ENCOUNTER — TELEPHONE (OUTPATIENT)
Dept: INTERNAL MEDICINE | Age: 49
End: 2024-02-13

## 2024-02-13 ENCOUNTER — TELEPHONE (OUTPATIENT)
Dept: OBGYN | Age: 49
End: 2024-02-13

## 2024-02-13 DIAGNOSIS — Z12.31 SCREENING MAMMOGRAM, ENCOUNTER FOR: Primary | ICD-10-CM

## 2024-02-13 DIAGNOSIS — R92.30 DENSE BREAST TISSUE ON MAMMOGRAM, UNSPECIFIED TYPE: ICD-10-CM

## 2024-02-13 NOTE — TELEPHONE ENCOUNTER
Interpretation services provided in this encounter via: Phone : Session Code: [id number].       Last Appointment:  11/30/2023  No Show 1-5-24.  No future appointments.      PAP application here for pt for her Januvia.  No answer when pt called per [facility] . Message left per  to call [phone] to schedule appt here in the office.

## 2024-02-14 NOTE — TELEPHONE ENCOUNTER
Pt notified Roman Kwan,  that she is unable to keep the appt that she scheduled here in the office. Pt called today and appt rescheduled.    Future Appointments   Date Time Provider Department Center   2/29/2024 10:30 AM Kayden Salcedo MD [facility]

## 2024-02-29 ENCOUNTER — HOSPITAL ENCOUNTER (OUTPATIENT)
Age: 49
Discharge: HOME | End: 2024-03-02

## 2024-02-29 ENCOUNTER — OFFICE VISIT (OUTPATIENT)
Dept: INTERNAL MEDICINE | Age: 49
End: 2024-02-29

## 2024-02-29 ENCOUNTER — HOSPITAL ENCOUNTER (OUTPATIENT)
Dept: GENERAL RADIOLOGY | Age: 49
Discharge: HOME | End: 2024-03-02

## 2024-02-29 ENCOUNTER — SOCIAL WORK (OUTPATIENT)
Dept: INTERNAL MEDICINE | Age: 49
End: 2024-02-29

## 2024-02-29 VITALS
DIASTOLIC BLOOD PRESSURE: 60 MMHG | RESPIRATION RATE: 16 BRPM | BODY MASS INDEX: 27.64 KG/M2 | SYSTOLIC BLOOD PRESSURE: 129 MMHG | OXYGEN SATURATION: 96 % | HEIGHT: 61 IN | HEART RATE: 87 BPM | TEMPERATURE: 97.8 F | WEIGHT: 146.4 LBS

## 2024-02-29 DIAGNOSIS — M25.561 ACUTE PAIN OF RIGHT KNEE: ICD-10-CM

## 2024-02-29 DIAGNOSIS — I10 PRIMARY HYPERTENSION: ICD-10-CM

## 2024-02-29 DIAGNOSIS — E11.9 TYPE 2 DIABETES MELLITUS WITHOUT COMPLICATION, WITHOUT LONG-TERM CURRENT USE OF INSULIN (HCC): Primary | ICD-10-CM

## 2024-02-29 LAB — HBA1C MFR BLD: 8.2 %

## 2024-02-29 PROCEDURE — 3052F HG A1C>EQUAL 8.0%<EQUAL 9.0%: CPT

## 2024-02-29 PROCEDURE — 99213 OFFICE O/P EST LOW 20 MIN: CPT

## 2024-02-29 PROCEDURE — 3078F DIAST BP <80 MM HG: CPT

## 2024-02-29 PROCEDURE — 73560 X-RAY EXAM OF KNEE 1 OR 2: CPT

## 2024-02-29 PROCEDURE — 99212 OFFICE O/P EST SF 10 MIN: CPT

## 2024-02-29 PROCEDURE — 3074F SYST BP LT 130 MM HG: CPT

## 2024-02-29 PROCEDURE — 83036 HEMOGLOBIN GLYCOSYLATED A1C: CPT

## 2024-02-29 RX ORDER — LOSARTAN POTASSIUM AND HYDROCHLOROTHIAZIDE 25; 100 MG/1; MG/1
1 TABLET ORAL DAILY
Qty: 90 TABLET | Refills: 1 | Status: SHIPPED | OUTPATIENT
Start: 2024-02-29

## 2024-02-29 ASSESSMENT — PATIENT HEALTH QUESTIONNAIRE - PHQ9
SUM OF ALL RESPONSES TO PHQ QUESTIONS 1-9: 0
1. LITTLE INTEREST OR PLEASURE IN DOING THINGS: 0
SUM OF ALL RESPONSES TO PHQ QUESTIONS 1-9: 0
SUM OF ALL RESPONSES TO PHQ QUESTIONS 1-9: 0
2. FEELING DOWN, DEPRESSED OR HOPELESS: 0
SUM OF ALL RESPONSES TO PHQ QUESTIONS 1-9: 0
SUM OF ALL RESPONSES TO PHQ9 QUESTIONS 1 & 2: 0

## 2024-02-29 NOTE — PATIENT INSTRUCTIONS
Dear Ness Dillard,      Thank you for coming to your appointment today. I hope we have addressed all of your needs.     Please make sure to do the following:  Continue your medications as listed.  In this visit, the following additional orders/instructions were discussed:  Continue your routine exercise, avoid strenuous activities  We will follow-up on x-ray right knee, we will call you if any of the labs abnormality meanwhile you can use Voltaren gel over-the-counter for pain relief as needed  Continue your current medications  Follow-up with your PCP  Get ordered labs drawn before our next follow up.  Continue to exercise to maintain good health      Other Follow-Ups:    Future Appointments   Date Time Provider Department Center   3/19/2024  3:00 PM YZ CLARISSA EWELINA RM 1 JADA ROMO [facility]   5/20/2024  2:00 PM Sourav Ivy MD [facility]       If a referral was placed on your behalf during your visit, you should expect to receive information about the date and time of your referral appointment within 7-10 days. If not, please call the office at [phone] and ask to speak to the .    Should you have further questions in regards to this visit, you can review your clinical note and after visit summary document on your appss[employer]vy [employer] account.  Other questions can be directed to our nurse line at [phone].     Discharge instruction reviewed with Patient. Patient verbalizes understanding.      Sincerely,  Kayden Salcedo M.D PGY-2  2/29/2024  11:39 AM T

## 2024-02-29 NOTE — PROGRESS NOTES
[facility]  Internal Medicine Residency Clinic    Attending Physician Statement  I have discussed the case, including pertinent history and exam findings with the resident physician.  I agree with the assessment, plan and orders as documented by the resident. I have reviewed the relevant PMHx, PSHx, FamHx, SocialHx, medications, and allergies and updated history as appropriate.    Patient presents for routine follow up of medical problems.     HO of hypertension with controlled BPs today. Continue Losartan.  BW was not done due to lack of insurance.'A1C was 8.2 due to non compliant to Januvia,  CO right knee pain probably DJD settled, recommended X-ray of the knee.    Remainder of medical problems as per resident note.    Arley Cui MD  2/29/2024 11:37 AM    
unexpected weight change.   HENT:  Negative for congestion and sore throat.    Eyes:  Negative for visual disturbance.   Respiratory:  Negative for cough, shortness of breath and wheezing.    Cardiovascular:  Negative for chest pain, palpitations and leg swelling.   Gastrointestinal:  Negative for abdominal distention, abdominal pain, blood in stool, diarrhea, nausea and vomiting.   Endocrine: Negative for cold intolerance and heat intolerance.   Genitourinary:  Negative for dysuria, flank pain and hematuria.   Musculoskeletal:  Negative for back pain, joint swelling, myalgias, neck pain and neck stiffness.   Skin:  Negative for rash.   Neurological:  Negative for dizziness, weakness, light-headedness and headaches.   Psychiatric/Behavioral:  Negative for confusion and suicidal ideas.        Current Outpatient Medications on File Prior to Visit   Medication Sig Dispense Refill    clotrimazole-betamethasone (LOTRISONE) 1-0.05 % cream Apply topically 2 times daily. 45 g 0    atorvastatin (LIPITOR) 40 MG tablet TAKE 1 TABLET BY MOUTH ONE TIME A DAY 90 tablet 1    Blood Pressure KIT Check blood pressure daily 1 kit 0    blood glucose test strips (AGAMATRIX AMP TEST) strip Pt to check BS daily. 100 each 3    AGAMATRIX ULTRA-THIN LANCETS MISC Check BS daily 100 each 3    Lancet Device MISC Pt to check BS daily 100 Device 3    Blood Glucose Monitoring Suppl (AGAMATRIX AMP) SREE 1 glucometer 1 Device 0    JANUVIA 100 MG tablet Take 1 tablet by mouth daily PAP (Patient not taking: Reported on 2/29/2024) 90 tablet 3    ibuprofen (ADVIL;MOTRIN) 600 MG tablet Take 1 tablet by mouth every 6 hours as needed for Pain 28 tablet 0     No current facility-administered medications on file prior to visit.       I have reviewed all pertinent PMHx, PSHx, FamHx, SocialHx, medications, and allergies and updated history as appropriate.    OBJECTIVE:  Physical Exam:  Vitals: /60 (Site: Left Upper Arm, Position: Sitting, Cuff Size:

## 2024-02-29 NOTE — PROGRESS NOTES
Requested to clarify status of PAP Jeanmarieuvia as pt reports she has been without for 4 weeks.  Spoke with pt thru [facility] , MARKO Randhawa; pt states she completed paperwork and returned to PAP office; LSW verified with Lauren of PAP that pt's paperwork has been received.  Note states  has PAP application which was obtained and Dr Carias had signed  and verified with Dr Anthony that doseage remains the same at 100mg.  Lauren aware paperwork in PAP box

## 2024-03-01 ASSESSMENT — ENCOUNTER SYMPTOMS
DIARRHEA: 0
ABDOMINAL PAIN: 0
SORE THROAT: 0
NAUSEA: 0
VOMITING: 0
BACK PAIN: 0
COUGH: 0
BLOOD IN STOOL: 0
WHEEZING: 0
SHORTNESS OF BREATH: 0
ABDOMINAL DISTENTION: 0

## 2024-03-19 ENCOUNTER — HOSPITAL ENCOUNTER (OUTPATIENT)
Dept: GENERAL RADIOLOGY | Age: 49
Discharge: HOME | End: 2024-03-21

## 2024-03-19 VITALS — HEIGHT: 60 IN | BODY MASS INDEX: 28.07 KG/M2 | WEIGHT: 143 LBS

## 2024-03-19 DIAGNOSIS — Z12.31 SCREENING MAMMOGRAM, ENCOUNTER FOR: ICD-10-CM

## 2024-03-19 DIAGNOSIS — R92.30 DENSE BREAST TISSUE ON MAMMOGRAM, UNSPECIFIED TYPE: ICD-10-CM

## 2024-03-19 PROCEDURE — 77063 BREAST TOMOSYNTHESIS BI: CPT

## 2024-03-20 ENCOUNTER — TELEPHONE (OUTPATIENT)
Dept: INTERNAL MEDICINE | Age: 49
End: 2024-03-20

## 2024-03-20 DIAGNOSIS — E11.9 TYPE 2 DIABETES MELLITUS WITHOUT COMPLICATION, WITHOUT LONG-TERM CURRENT USE OF INSULIN (HCC): ICD-10-CM

## 2024-03-20 RX ORDER — GLIMEPIRIDE 1 MG/1
1 TABLET ORAL
Qty: 30 TABLET | Refills: 3 | Status: SHIPPED | OUTPATIENT
Start: 2024-03-20

## 2024-03-20 NOTE — TELEPHONE ENCOUNTER
Patient is waiting for Januvia PAP and application process is taking time with December applications being processed now.    Lab work reviewed: A1c 8.2 (2/29/2024), last CMP (7/6/2023) reviewed with normal kidney function, GFR > 60    Call placed to patient with Mr. Owens Aniya, clinic .  Patient is currently on metformin 500 mg daily twice a day. She reports that she was not able to tolerate metformin 1000 mg BID and that was reason she was started on Tab Januvia.    Plan to start her on Tab Glimepride 1 mg daily with breakfast while patient is waiting for paperwork for Januvia PAP.  Side effects of hypoglycemia discussed.   Patient verbalized understanding.    Electronically signed by Kaykay Reyez MD on 3/20/24 at 11:03 AM EDT

## 2024-03-20 NOTE — TELEPHONE ENCOUNTER
Pt called and spoke with Roman Randhawa, , to report she has not received her PAP Januvia.   Called and spoke with Lauren in PAP office. States application was faxed 3-1-24. She called , 20lines and was notified application has not yet been processed. They are still working on the December applications. Pt with no insurance. Is there anything patient should do or take in the interim? Please advise.

## 2024-03-22 NOTE — TELEPHONE ENCOUNTER
Reviewed with Roman Randhawa,  on 3-20-21. States  and he called the patient to discuss and notify of new orders.

## 2024-04-02 ENCOUNTER — TELEPHONE (OUTPATIENT)
Dept: INTERNAL MEDICINE | Age: 49
End: 2024-04-02

## 2024-04-02 NOTE — TELEPHONE ENCOUNTER
Advising to take 2 mg of Amaryl until receive januvia, and keep checking blood glucose like now. If blood glucose drops below 100 then back to 1 mg amaryl. Need to be cautious about low blood glucose symptoms like sweating, increased heart rate.     When, receive januvia, then back to 1 mg amaryl.     Thanks

## 2024-04-02 NOTE — TELEPHONE ENCOUNTER
Pt dtr called in stating pt reported her blood sugars going up since starting the Glimepiride 10 days ago, pt states blood sugars have been 230,260,132,186 yesterday, and today 219 fasting. Pt is continuing to take her metformin also. Please advise.

## 2024-05-20 ENCOUNTER — OFFICE VISIT (OUTPATIENT)
Dept: INTERNAL MEDICINE | Age: 49
End: 2024-05-20

## 2024-05-20 VITALS
TEMPERATURE: 98.5 F | OXYGEN SATURATION: 96 % | SYSTOLIC BLOOD PRESSURE: 124 MMHG | DIASTOLIC BLOOD PRESSURE: 63 MMHG | BODY MASS INDEX: 29.02 KG/M2 | WEIGHT: 147.8 LBS | HEIGHT: 60 IN | RESPIRATION RATE: 18 BRPM | HEART RATE: 93 BPM

## 2024-05-20 DIAGNOSIS — I10 PRIMARY HYPERTENSION: ICD-10-CM

## 2024-05-20 DIAGNOSIS — K21.9 GASTROESOPHAGEAL REFLUX DISEASE WITHOUT ESOPHAGITIS: Primary | ICD-10-CM

## 2024-05-20 DIAGNOSIS — E11.9 TYPE 2 DIABETES MELLITUS WITHOUT COMPLICATION, WITHOUT LONG-TERM CURRENT USE OF INSULIN (HCC): ICD-10-CM

## 2024-05-20 PROCEDURE — 3052F HG A1C>EQUAL 8.0%<EQUAL 9.0%: CPT

## 2024-05-20 PROCEDURE — 99213 OFFICE O/P EST LOW 20 MIN: CPT

## 2024-05-20 PROCEDURE — 3078F DIAST BP <80 MM HG: CPT

## 2024-05-20 PROCEDURE — 3074F SYST BP LT 130 MM HG: CPT

## 2024-05-20 RX ORDER — SITAGLIPTIN 100 MG/1
100 TABLET, FILM COATED ORAL DAILY
Qty: 90 TABLET | Refills: 3 | Status: SHIPPED | OUTPATIENT
Start: 2024-05-20

## 2024-05-20 RX ORDER — LOSARTAN POTASSIUM AND HYDROCHLOROTHIAZIDE 25; 100 MG/1; MG/1
1 TABLET ORAL DAILY
Qty: 90 TABLET | Refills: 1 | Status: SHIPPED | OUTPATIENT
Start: 2024-05-20

## 2024-05-20 RX ORDER — ATORVASTATIN CALCIUM 40 MG/1
TABLET, FILM COATED ORAL
Qty: 90 TABLET | Refills: 1 | Status: SHIPPED | OUTPATIENT
Start: 2024-05-20

## 2024-05-20 NOTE — PROGRESS NOTES
[facility]  Internal Medicine Residency Program  ACC Note      CC: had concerns including Follow-up, Hypertension, and Diabetes (Pt states her BS has been running high. BS this morning was 135).    HPI:Ness Dillard has a PMHx of DMT2, HTN, HLD, GERD presented to the [facility] for a routine visit     Interpretor [id number]    /63      this am, cont metformin and januvia. Stop amaryl, pt hasn't been taking. If A1c still elevated on next visit will restart.     Rt knee pain - Rt knee x-ray was unremarkable, volteran gel helps, occasional ibuprofen    Health Care Maintenance:   Mammogram 3/2024 by ObGyn (Dr. To) neg, repeat 1 year   Foot exam 5/2024 no issues     Things to follow up:  If A1c still elevated then restart amaryl 1mg daily    ASSESSMENT/PLAN:  1. Gastroesophageal reflux disease without esophagitis  Overview:  Takes omeprazole daily   Will use tums for breakthrough pain  2. Type 2 diabetes mellitus without complication, without long-term current use of insulin ([facility])  Overview:  Diabetes since 2016  Microvascular complications - last eye exam follows yearly, last foot exam 5/2024, neuropathy none  Does tolerates metformin at this level but not higher  Their home reference range is 126-140. Pt has have glucose strips and monitor at home.  no episodes of hypoglycemia, pt recommended to have have sugar near them in their nightstand/by couch.   Was on amaryl 1mg previously, tolerated well   Diabetic Medications       Biguanides       metFORMIN (GLUCOPHAGE) 500 MG tablet Take 1 tablet by mouth 2 times daily (with meals)       Dipeptidyl Peptidase-4 (DPP-4) Inhibitors       JANUVIA 100 MG tablet Take 1 tablet by mouth daily PAP           Hemoglobin A1C   Date Value Ref Range Status   02/29/2024 8.2 % Final   11/30/2023 7.6 % Final   07/10/2023 7.5 % Final      Lab Results   Component Value Date    MALBCR - (AA) 07/06/2023       Orders:  -     atorvastatin (LIPITOR) 40 MG tablet; TAKE

## 2024-05-20 NOTE — PROGRESS NOTES
[facility]  Internal Medicine Residency Clinic    Attending Physician Statement  I have discussed the case, including pertinent history and exam findings with the resident physician.  I agree with the assessment, plan and orders as documented by the resident. I have reviewed all pertinent PMHx, PSHx, FamHx, SocialHx, medications, and allergies and updated history as appropriate.    Patient here for routine follow up of medical problems.   HTN - well controlled on losartan/HCTZ.    Continue present management.    Check CMP prior to next visit.     DM - was out of Januvia for some time due to supply issues. Now has a supply of this medication and taking this along with Metformin, 500 mg twice daily. Evaluation with next HbA1c to see if glmepiride is needed to add back to regimen.     Continue current regimen and recheck HbA1c at 3 months.    Check lipid panel prior to next visit as well.     Remainder of medical problems as per resident note.    Miley Barker MD  5/20/2024 2:26 PM

## 2024-05-20 NOTE — PATIENT INSTRUCTIONS
Dear Ness Dillard,    Thank you for coming to your appointment today. I hope all of your concerns have been addressed today.     Please make sure to do the following:  Continue your medications as listed.  Continue metformin and januvia for diabetes   Get labs drawn before our next follow up.  We will see each other again in 15 weeks    Call for a sooner appointment if you develop any issues     Have a great day!    Sincerely,  Sourav Ivy M.D  5/20/2024  2:29 PM

## 2024-06-17 ENCOUNTER — TELEPHONE (OUTPATIENT)
Dept: INTERNAL MEDICINE | Age: 49
End: 2024-06-17

## 2024-06-17 NOTE — TELEPHONE ENCOUNTER
Pt calling for refill for Glimepiride pt does not have any left the patient asking for refill today so her sugar does not go out of wack.

## 2024-06-17 NOTE — TELEPHONE ENCOUNTER
Pt notified she was to discontinue Glimperide and to continue with Januvia and will recheck A1C at next appt to see if medication needs adjusted. Pt stated understanding. And pt also notified if blood sugars do increase to please call clinic to discuss.

## 2024-07-12 ENCOUNTER — TELEPHONE (OUTPATIENT)
Dept: INTERNAL MEDICINE | Age: 49
End: 2024-07-12

## 2024-07-12 NOTE — TELEPHONE ENCOUNTER
Pts daughter called stating that her mother Ness has not been feeling good since she has been out of her januvia for the last 3 days her blood sugars are running 150s-170s and is complaining of blurry vision headache and numbness and tingling in her fingers  denies any extremity weakness or speech issues  scheduled appt for this afternoon

## 2024-10-21 ENCOUNTER — OFFICE VISIT (OUTPATIENT)
Dept: INTERNAL MEDICINE | Age: 49
End: 2024-10-21

## 2024-10-21 VITALS
WEIGHT: 148 LBS | SYSTOLIC BLOOD PRESSURE: 136 MMHG | TEMPERATURE: 97.2 F | BODY MASS INDEX: 29.06 KG/M2 | DIASTOLIC BLOOD PRESSURE: 64 MMHG | OXYGEN SATURATION: 97 % | HEART RATE: 83 BPM | RESPIRATION RATE: 18 BRPM | HEIGHT: 60 IN

## 2024-10-21 DIAGNOSIS — E11.9 TYPE 2 DIABETES MELLITUS WITHOUT COMPLICATION, WITHOUT LONG-TERM CURRENT USE OF INSULIN (HCC): ICD-10-CM

## 2024-10-21 DIAGNOSIS — I10 PRIMARY HYPERTENSION: ICD-10-CM

## 2024-10-21 PROCEDURE — 3052F HG A1C>EQUAL 8.0%<EQUAL 9.0%: CPT

## 2024-10-21 PROCEDURE — 3075F SYST BP GE 130 - 139MM HG: CPT

## 2024-10-21 PROCEDURE — 83036 HEMOGLOBIN GLYCOSYLATED A1C: CPT

## 2024-10-21 PROCEDURE — 99212 OFFICE O/P EST SF 10 MIN: CPT

## 2024-10-21 PROCEDURE — 3078F DIAST BP <80 MM HG: CPT

## 2024-10-21 PROCEDURE — 99213 OFFICE O/P EST LOW 20 MIN: CPT

## 2024-10-21 RX ORDER — ATORVASTATIN CALCIUM 40 MG/1
TABLET, FILM COATED ORAL
Qty: 90 TABLET | Refills: 1 | Status: SHIPPED | OUTPATIENT
Start: 2024-10-21

## 2024-10-21 RX ORDER — LOSARTAN POTASSIUM AND HYDROCHLOROTHIAZIDE 25; 100 MG/1; MG/1
1 TABLET ORAL DAILY
Qty: 90 TABLET | Refills: 1 | Status: SHIPPED | OUTPATIENT
Start: 2024-10-21

## 2024-10-21 RX ORDER — SITAGLIPTIN 100 MG/1
100 TABLET, FILM COATED ORAL DAILY
Qty: 90 TABLET | Refills: 3 | Status: SHIPPED | OUTPATIENT
Start: 2024-10-21

## 2024-10-21 NOTE — PROGRESS NOTES
[facility]  Internal Medicine Residency Clinic    Attending Physician Statement  I have discussed the case, including pertinent history and exam findings with the resident physician.  I agree with the assessment, plan and orders as documented by the resident. I have reviewed all pertinent PMHx, PSHx, FamHx, SocialHx, medications, and allergies and updated history as appropriate.    Patient here for routine follow up of medical problems.   HTN - controlled on losartan-HCTZ   Continue same.     DM - improved on Metformin and Januvia. HbA1c slowly improving.    Continue Januvia and increase Metformin.   Needs ophthalmology appointment.    Needs labs updated.    Check microalbumin.     Remainder of medical problems as per resident note.    Miley Barker MD  10/21/2024 2:41 PM      
tablet; Take 0.5 tablets by mouth 2 times daily (with meals), Disp-90 tablet, R-1Normal  -     POCT glycosylated hemoglobin (Hb A1C)  -     Creatinine, Random Urine; Future  -     MICROALBUMIN, RANDOM URINE (W/O CREATININE); Future  2. Primary hypertension  Overview:  Since 2016, well controlled  Compliant with medications  Does not report any headaches, blurry vision, dizziness, chest pain, shortness of breath, or palpitations.  BP Readings from Last 3 Encounters:   10/21/24 136/64   05/20/24 124/63   02/29/24 129/60      Hypertension Medications       Antihypertensive Combinations       losartan-hydroCHLOROthiazide (HYZAAR) 100-25 MG per tablet Take 1 tablet by mouth daily            Orders:  -     losartan-hydroCHLOROthiazide (HYZAAR) 100-25 MG per tablet; Take 1 tablet by mouth daily, Disp-90 tablet, R-1Normal       SUBJECTIVE:    ROS:  Constitutional:  Negative for appetite change, chills, fatigue, fever and unexpected weight change.   HENT:  Negative for sneezing and sore throat.    Eyes:  Negative for discharge and redness.   Respiratory:  Negative for cough, chest tightness, shortness of breath and wheezing.    Cardiovascular:  Negative for chest pain, palpitations and leg swelling.   Gastrointestinal:  Negative for N/V/C/D, abdominal distention, abdominal pain. Occasional heartburn  Genitourinary:  Negative for dysuria and increased frequency.   Musculoskeletal: Negative.    Skin: Negative.    Neurological:  Negative for dizziness, weakness, light-headedness and headaches.   Other:     I have reviewed all pertinent PMHx, PSHx, FamHx, SocialHx, medications, and allergies and updated history as appropriate.    OBJECTIVE:    VS:   /64 (Site: Left Upper Arm, Position: Sitting, Cuff Size: Medium Adult)   Pulse 83   Temp 97.2 °F (36.2 °C) (Temporal)   Resp 18   Ht 1.524 m (5')   Wt 67.1 kg (148 lb)   SpO2 97%   BMI 28.90 kg/m²     Physical Exam:  General: No acute distress. Awake and conversant.

## 2024-10-21 NOTE — PATIENT INSTRUCTIONS
Dear Ness Dillard,    Thank you for coming to your appointment today. I hope all of your concerns have been addressed.     Please make sure to do the following:  Continue your medications as listed.  Please get labs and images before our next visit. You will be contacted if there is anything abnormal otherwise your results will be discussed on your next visit. You are welcome to call anytime and inquire about the results.   Call your eye doctor and schedule an appointment  Increase your metformin from 500 twice a day to 1,000 twice a day. If not tolerating the increase go back to the 500 twice a day and call the lcinic  You will see you PCP in week of 2/17    Call for a sooner appointment if you develop any issues     Have a great day!    Sincerely,  Sourav Ivy M.D  10/21/2024  2:45 PM

## 2024-10-30 ENCOUNTER — TELEPHONE (OUTPATIENT)
Dept: INTERNAL MEDICINE | Age: 49
End: 2024-10-30

## 2024-10-30 NOTE — TELEPHONE ENCOUNTER
Pt's daughter Adriana called in stating that pt is not tolerating 1000 mg of metformin and would like to start taking 500 mg again. Per pt's daughter pt has been experiencing nausea,vomiting and headaches since starting 1000 mg of metformin. Please advise.

## 2025-02-19 ENCOUNTER — OFFICE VISIT (OUTPATIENT)
Dept: INTERNAL MEDICINE | Age: 50
End: 2025-02-19

## 2025-02-19 VITALS
SYSTOLIC BLOOD PRESSURE: 131 MMHG | HEART RATE: 90 BPM | TEMPERATURE: 97.3 F | OXYGEN SATURATION: 96 % | WEIGHT: 146.5 LBS | RESPIRATION RATE: 14 BRPM | HEIGHT: 61 IN | BODY MASS INDEX: 27.66 KG/M2 | DIASTOLIC BLOOD PRESSURE: 82 MMHG

## 2025-02-19 DIAGNOSIS — E11.9 TYPE 2 DIABETES MELLITUS WITHOUT COMPLICATION, WITHOUT LONG-TERM CURRENT USE OF INSULIN (HCC): Primary | ICD-10-CM

## 2025-02-19 DIAGNOSIS — G62.9 NEUROPATHY: ICD-10-CM

## 2025-02-19 DIAGNOSIS — I10 PRIMARY HYPERTENSION: ICD-10-CM

## 2025-02-19 DIAGNOSIS — E78.00 PURE HYPERCHOLESTEROLEMIA: ICD-10-CM

## 2025-02-19 LAB — HBA1C MFR BLD: 8.1 %

## 2025-02-19 PROCEDURE — 83036 HEMOGLOBIN GLYCOSYLATED A1C: CPT

## 2025-02-19 RX ORDER — ATORVASTATIN CALCIUM 40 MG/1
TABLET, FILM COATED ORAL
Qty: 90 TABLET | Refills: 1 | Status: SHIPPED | OUTPATIENT
Start: 2025-02-19

## 2025-02-19 RX ORDER — GABAPENTIN 100 MG/1
100 CAPSULE ORAL NIGHTLY
Qty: 90 CAPSULE | Refills: 1 | Status: SHIPPED | OUTPATIENT
Start: 2025-02-19 | End: 2025-08-18

## 2025-02-19 RX ORDER — LOSARTAN POTASSIUM AND HYDROCHLOROTHIAZIDE 25; 100 MG/1; MG/1
1 TABLET ORAL DAILY
Qty: 90 TABLET | Refills: 1 | Status: SHIPPED | OUTPATIENT
Start: 2025-02-19

## 2025-02-19 RX ORDER — SITAGLIPTIN 100 MG/1
100 TABLET, FILM COATED ORAL DAILY
Qty: 90 TABLET | Refills: 3 | Status: SHIPPED | OUTPATIENT
Start: 2025-02-19

## 2025-02-19 RX ORDER — IBUPROFEN 600 MG/1
600 TABLET, FILM COATED ORAL EVERY 6 HOURS PRN
Qty: 28 TABLET | Refills: 0 | Status: CANCELLED | OUTPATIENT
Start: 2025-02-19 | End: 2025-02-26

## 2025-02-19 SDOH — ECONOMIC STABILITY: FOOD INSECURITY: WITHIN THE PAST 12 MONTHS, THE FOOD YOU BOUGHT JUST DIDN'T LAST AND YOU DIDN'T HAVE MONEY TO GET MORE.: NEVER TRUE

## 2025-02-19 SDOH — ECONOMIC STABILITY: FOOD INSECURITY: WITHIN THE PAST 12 MONTHS, YOU WORRIED THAT YOUR FOOD WOULD RUN OUT BEFORE YOU GOT MONEY TO BUY MORE.: NEVER TRUE

## 2025-02-19 ASSESSMENT — PATIENT HEALTH QUESTIONNAIRE - PHQ9
SUM OF ALL RESPONSES TO PHQ QUESTIONS 1-9: 0
SUM OF ALL RESPONSES TO PHQ9 QUESTIONS 1 & 2: 0
2. FEELING DOWN, DEPRESSED OR HOPELESS: NOT AT ALL
1. LITTLE INTEREST OR PLEASURE IN DOING THINGS: NOT AT ALL

## 2025-02-19 NOTE — PATIENT INSTRUCTIONS
Dear Ness Dillard,    Thank you for coming to your appointment today. I hope all of your concerns have been addressed.     Please make sure to do the following:  Continue your medications as listed.  Please get labs and images before our next visit. You will be contacted if there is anything abnormal otherwise your results will be discussed on your next visit. You are welcome to call anytime and inquire about the results.   Call Dr. To for mammogram   Phone: [phone]  You will see you PCP in week of 6/2    Call for a sooner appointment if you develop any issues     Have a great day!    Sincerely,  Sourav Ivy M.D  2/19/2025  3:17 PM

## 2025-02-19 NOTE — PROGRESS NOTES
[facility]  Internal Medicine Residency Clinic    Attending Physician Statement  I have discussed the case, including pertinent history and exam findings with the resident physician. I agree with the assessment, plan and orders as documented by the resident. I have reviewed the relevant PMHx, PSHx, FamHx, SocialHx, medications, and allergies and updated history as appropriate.    Patient presents for routine follow up of medical problems.  Case discussed with PGY3 and medical student  Glycemic control is somewhat worse  Not a candidate for sulfonylureas  Consider adding insulin therapy when she is willing  Risk modification strategies discussed including diet and exercise    Remainder of medical problems as per resident note.    Mariya Williamson MD  2/19/2025 3:33 PM

## 2025-02-19 NOTE — PROGRESS NOTES
[facility]  Internal Medicine Residency Program  ACC Note      CC: had concerns including Follow-up (Here for regular visit. Interpretor Roman is present for visit. Pt has given consent. ), Hypertension (Checks Bp at home ranges 120/60), and Diabetes (Checks blood sugar at home. Ranges  135-150. ).    HPI:Ness Dillard has a PMHx of DMT2, HTN, HLD, GERD presented to the [facility] for a routine visit     Interpretor Roman in the room     /82, no changes    Had flu on vday. Fully recovered     Burning on bottom of feet , 8.1%. No insurance, self pay will defer glp1 and sglt2. Strongly recommended lifestyle modification. Will start insulin next time if diabetes still not controlled. Given 10 years of diabetes doubt candidate for sulfonurea     Pt to schedule eye doctor appt, sees them yearly    Health Care Maintenance:   Mammogram 3/2024 by Ob[facility]yn (Dr. To) neg, repeat 1 year   Foot exam 5/2024 no issues , again 2/2025 no issues.     Things to follow up:  Check A1c see if she needs to be started on insulin   Labs including urine    ASSESSMENT/PLAN:  1. Type 2 diabetes mellitus without complication, without long-term current use of insulin ([facility])  Overview:  Diabetes since 2016  Microvascular complications - last eye exam follows yearly, last foot exam 5/2024, neuropathy none  Does tolerates metformin at this level but not higher  Their home reference range is 126-140. Pt has have glucose strips and monitor at home.  no episodes of hypoglycemia, pt recommended to have have sugar near them in their nightstand/by couch.   Was on amaryl 1mg previously, tolerated well   Diabetic Medications       Biguanides       metFORMIN (GLUCOPHAGE) 1000 MG tablet Take 0.5 tablets by mouth 2 times daily (with meals)       Dipeptidyl Peptidase-4 (DPP-4) Inhibitors       JANUVIA 100 MG tablet Take 1 tablet by mouth daily PAP           Hemoglobin A1C   Date Value Ref Range Status   02/29/2024 8.2 % Final

## 2025-02-20 DIAGNOSIS — E11.9 TYPE 2 DIABETES MELLITUS WITHOUT COMPLICATION, WITHOUT LONG-TERM CURRENT USE OF INSULIN (HCC): ICD-10-CM

## 2025-02-25 ENCOUNTER — TELEPHONE (OUTPATIENT)
Dept: INTERNAL MEDICINE | Age: 50
End: 2025-02-25

## 2025-02-25 DIAGNOSIS — E11.9 TYPE 2 DIABETES MELLITUS WITHOUT COMPLICATION, WITHOUT LONG-TERM CURRENT USE OF INSULIN (HCC): ICD-10-CM

## 2025-02-25 NOTE — TELEPHONE ENCOUNTER
Sent new Rx to pharmacy due to family stating did not have the one just recently ordered that was for the incorrect dose.     Electronically signed by Palma Renner MD on 2/25/2025 at 1:44 PM

## 2025-02-25 NOTE — TELEPHONE ENCOUNTER
Significant other of pt called about lowering the dose of pt metformin due to side effects. He states that she was on 1000 mg starting 10/21/2024 and was supposed to be taken off due to side effects, and was told the dose would be lowered back down to 500 mg, but 1000 mg was reordered on 02/19/2025.

## 2025-03-25 ENCOUNTER — HOSPITAL ENCOUNTER (OUTPATIENT)
Age: 50
Discharge: HOME | End: 2025-03-25

## 2025-03-25 DIAGNOSIS — E11.9 TYPE 2 DIABETES MELLITUS WITHOUT COMPLICATION, WITHOUT LONG-TERM CURRENT USE OF INSULIN: ICD-10-CM

## 2025-03-25 DIAGNOSIS — G62.9 NEUROPATHY: ICD-10-CM

## 2025-03-25 DIAGNOSIS — I10 PRIMARY HYPERTENSION: ICD-10-CM

## 2025-03-25 LAB
ANION GAP SERPL CALCULATED.3IONS-SCNC: 16 MMOL/L (ref 7–16)
BASOPHILS # BLD: 0.07 K/UL (ref 0–0.2)
BASOPHILS NFR BLD: 1 % (ref 0–2)
BUN SERPL-MCNC: 14 MG/DL (ref 6–20)
CALCIUM SERPL-MCNC: 9.6 MG/DL (ref 8.6–10.2)
CHLORIDE SERPL-SCNC: 98 MMOL/L (ref 98–107)
CHOLEST SERPL-MCNC: 140 MG/DL
CO2 SERPL-SCNC: 23 MMOL/L (ref 22–29)
CREAT SERPL-MCNC: 0.5 MG/DL (ref 0.5–1)
CREAT UR-MCNC: 135.6 MG/DL (ref 29–226)
EOSINOPHIL # BLD: 0.26 K/UL (ref 0.05–0.5)
EOSINOPHILS RELATIVE PERCENT: 3 % (ref 0–6)
ERYTHROCYTE [DISTWIDTH] IN BLOOD BY AUTOMATED COUNT: 16.6 % (ref 11.5–15)
FOLATE SERPL-MCNC: 17.8 NG/ML (ref 4.8–24.2)
GFR, ESTIMATED: >90 ML/MIN/1.73M2
GLUCOSE SERPL-MCNC: 152 MG/DL (ref 74–99)
HCT VFR BLD AUTO: 29.7 % (ref 34–48)
HDLC SERPL-MCNC: 51 MG/DL
HGB BLD-MCNC: 9.1 G/DL (ref 11.5–15.5)
IMM GRANULOCYTES # BLD AUTO: 0.04 K/UL (ref 0–0.58)
IMM GRANULOCYTES NFR BLD: 0 % (ref 0–5)
LDLC SERPL CALC-MCNC: 60 MG/DL
LYMPHOCYTES NFR BLD: 2.92 K/UL (ref 1.5–4)
LYMPHOCYTES RELATIVE PERCENT: 31 % (ref 20–42)
MCH RBC QN AUTO: 22.9 PG (ref 26–35)
MCHC RBC AUTO-ENTMCNC: 30.6 G/DL (ref 32–34.5)
MCV RBC AUTO: 74.6 FL (ref 80–99.9)
MICROALBUMIN UR-MCNC: 19 MG/L (ref 0–19)
MICROALBUMIN/CREAT UR-RTO: 14 MCG/MG CREAT (ref 0–30)
MONOCYTES NFR BLD: 0.58 K/UL (ref 0.1–0.95)
MONOCYTES NFR BLD: 6 % (ref 2–12)
NEUTROPHILS NFR BLD: 59 % (ref 43–80)
NEUTS SEG NFR BLD: 5.48 K/UL (ref 1.8–7.3)
PLATELET # BLD AUTO: 385 K/UL (ref 130–450)
PMV BLD AUTO: 10.7 FL (ref 7–12)
POTASSIUM SERPL-SCNC: 4 MMOL/L (ref 3.5–5)
RBC # BLD AUTO: 3.98 M/UL (ref 3.5–5.5)
SODIUM SERPL-SCNC: 137 MMOL/L (ref 132–146)
TRIGL SERPL-MCNC: 147 MG/DL
VIT B12 SERPL-MCNC: 639 PG/ML (ref 211–946)
VLDLC SERPL CALC-MCNC: 29 MG/DL
WBC OTHER # BLD: 9.4 K/UL (ref 4.5–11.5)

## 2025-03-25 PROCEDURE — 82607 VITAMIN B-12: CPT

## 2025-03-25 PROCEDURE — 36415 COLL VENOUS BLD VENIPUNCTURE: CPT

## 2025-03-25 PROCEDURE — 82746 ASSAY OF FOLIC ACID SERUM: CPT

## 2025-03-25 PROCEDURE — 85025 COMPLETE CBC W/AUTO DIFF WBC: CPT

## 2025-03-25 PROCEDURE — 80061 LIPID PANEL: CPT

## 2025-03-25 PROCEDURE — 80048 BASIC METABOLIC PNL TOTAL CA: CPT

## 2025-03-25 PROCEDURE — 82570 ASSAY OF URINE CREATININE: CPT

## 2025-03-25 PROCEDURE — 82043 UR ALBUMIN QUANTITATIVE: CPT

## 2025-03-28 ENCOUNTER — TELEPHONE (OUTPATIENT)
Dept: INTERNAL MEDICINE | Age: 50
End: 2025-03-28

## 2025-03-28 DIAGNOSIS — Z12.31 ENCOUNTER FOR SCREENING MAMMOGRAM FOR BREAST CANCER: Primary | ICD-10-CM

## 2025-04-08 ENCOUNTER — RESULTS FOLLOW-UP (OUTPATIENT)
Dept: INTERNAL MEDICINE | Age: 50
End: 2025-04-08

## 2025-04-08 DIAGNOSIS — D50.9 MICROCYTIC ANEMIA: Primary | ICD-10-CM

## 2025-04-08 NOTE — RESULT ENCOUNTER NOTE
Please call patient and inform her that her blood level was low. I am ordering a repeat blood count to confirm it is low and to evaluate for iron deficiency anemia.

## 2025-04-09 NOTE — TELEPHONE ENCOUNTER
Interpretation services provided in this encounter via: Live : Roman Randhawa [facility]  Pt called Roman regarding missed call and he stopped by this nurse's office to assist patient.  Pt notified of note per Dr. Ivy. Pt notified of need NPO status 8 hrs prior to lab work. Pt states will go to [employer] lab to have completed tomorrow AM. .

## 2025-04-09 NOTE — TELEPHONE ENCOUNTER
Interpretation services provided in this encounter via: Phone : Session Code: [id number]  .        Attempted to contact pt via . No answer.  left VM with results and new labs ordered per Dr. Silverio note.

## 2025-04-10 ENCOUNTER — HOSPITAL ENCOUNTER (OUTPATIENT)
Age: 50
Discharge: HOME | End: 2025-04-10

## 2025-04-10 ENCOUNTER — TELEPHONE (OUTPATIENT)
Dept: INTERNAL MEDICINE | Age: 50
End: 2025-04-10

## 2025-04-10 DIAGNOSIS — D50.9 MICROCYTIC ANEMIA: ICD-10-CM

## 2025-04-10 DIAGNOSIS — N64.4 BREAST PAIN: Primary | ICD-10-CM

## 2025-04-10 LAB
BASOPHILS # BLD: 0.06 K/UL (ref 0–0.2)
BASOPHILS NFR BLD: 1 % (ref 0–2)
EOSINOPHIL # BLD: 0.37 K/UL (ref 0.05–0.5)
EOSINOPHILS RELATIVE PERCENT: 5 % (ref 0–6)
ERYTHROCYTE [DISTWIDTH] IN BLOOD BY AUTOMATED COUNT: 16.2 % (ref 11.5–15)
FERRITIN SERPL-MCNC: 6 NG/ML
HCT VFR BLD AUTO: 28.8 % (ref 34–48)
HGB BLD-MCNC: 9 G/DL (ref 11.5–15.5)
IMM GRANULOCYTES # BLD AUTO: <0.03 K/UL (ref 0–0.58)
IMM GRANULOCYTES NFR BLD: 0 % (ref 0–5)
IRON SATN MFR SERPL: 5 % (ref 15–50)
IRON SERPL-MCNC: 23 UG/DL (ref 37–145)
LYMPHOCYTES NFR BLD: 3.27 K/UL (ref 1.5–4)
LYMPHOCYTES RELATIVE PERCENT: 43 % (ref 20–42)
MCH RBC QN AUTO: 22.6 PG (ref 26–35)
MCHC RBC AUTO-ENTMCNC: 31.3 G/DL (ref 32–34.5)
MCV RBC AUTO: 72.4 FL (ref 80–99.9)
MONOCYTES NFR BLD: 0.51 K/UL (ref 0.1–0.95)
MONOCYTES NFR BLD: 7 % (ref 2–12)
NEUTROPHILS NFR BLD: 44 % (ref 43–80)
NEUTS SEG NFR BLD: 3.34 K/UL (ref 1.8–7.3)
PLATELET # BLD AUTO: 422 K/UL (ref 130–450)
PMV BLD AUTO: 10.4 FL (ref 7–12)
RBC # BLD AUTO: 3.98 M/UL (ref 3.5–5.5)
TIBC SERPL-MCNC: 426 UG/DL (ref 250–450)
WBC OTHER # BLD: 7.6 K/UL (ref 4.5–11.5)

## 2025-04-10 PROCEDURE — 36415 COLL VENOUS BLD VENIPUNCTURE: CPT

## 2025-04-10 PROCEDURE — 85025 COMPLETE CBC W/AUTO DIFF WBC: CPT

## 2025-04-10 PROCEDURE — 83550 IRON BINDING TEST: CPT

## 2025-04-10 PROCEDURE — 83540 ASSAY OF IRON: CPT

## 2025-04-10 PROCEDURE — 82728 ASSAY OF FERRITIN: CPT

## 2025-04-10 RX ORDER — FERROUS SULFATE 325(65) MG
325 TABLET ORAL EVERY OTHER DAY
Qty: 90 TABLET | Refills: 0 | Status: SHIPPED | OUTPATIENT
Start: 2025-04-10

## 2025-04-10 NOTE — TELEPHONE ENCOUNTER
Called patient regarding message she left of breast pain, patient reports that the skin is intact, there is no drainage, no fever or chills, no signs of infection.    Patient reports she had her period a week ago and the pain started during the period and has persisted.   Patient had an order for mammogram and she is due for screening this year, however due to pain on her breast order was changed to diagnostic mammogram.    Patient aware and understood.     I also ordered iron tablets for patient as she has Iron deficiency anemia. Patient counseled and iron pills sent to her pharmacy of choice.

## 2025-04-15 ENCOUNTER — TELEPHONE (OUTPATIENT)
Dept: INTERNAL MEDICINE | Age: 50
End: 2025-04-15

## 2025-04-15 NOTE — TELEPHONE ENCOUNTER
Pt returned call and LSW returned with [facility]  #[id number]; pt reports she only has 2 more days of Januvia and would like advice as to if she needs different medication until being reapproved for Januvia thru PAP; LSW transferred call with  to [facility] to start re enrollment process; routed to  clinical pool for review

## 2025-04-15 NOTE — TELEPHONE ENCOUNTER
Call to pt per request of [facility] MARKO as pt had questions regarding PAP Januvia; checked with JOSUE Doe of [facility] PAp who indicates pt is due for renewal.  Phone call to pt with [facility]  # [phone] and message left to return call; per PAP manager, bilingual staff memeber will reach out to pt

## 2025-05-16 ENCOUNTER — HOSPITAL ENCOUNTER (OUTPATIENT)
Dept: GENERAL RADIOLOGY | Age: 50
Discharge: HOME | End: 2025-05-18

## 2025-05-16 VITALS — WEIGHT: 144 LBS | BODY MASS INDEX: 27.21 KG/M2

## 2025-05-16 DIAGNOSIS — N64.4 BREAST PAIN: ICD-10-CM

## 2025-05-16 PROCEDURE — G0279 TOMOSYNTHESIS, MAMMO: HCPCS

## 2025-05-21 DIAGNOSIS — E11.9 TYPE 2 DIABETES MELLITUS WITHOUT COMPLICATION, WITHOUT LONG-TERM CURRENT USE OF INSULIN (HCC): ICD-10-CM

## 2025-06-23 ENCOUNTER — OFFICE VISIT (OUTPATIENT)
Age: 50
End: 2025-06-23

## 2025-06-23 VITALS
DIASTOLIC BLOOD PRESSURE: 73 MMHG | BODY MASS INDEX: 28.29 KG/M2 | HEIGHT: 60 IN | TEMPERATURE: 97.8 F | SYSTOLIC BLOOD PRESSURE: 125 MMHG | WEIGHT: 144.1 LBS | OXYGEN SATURATION: 95 % | HEART RATE: 74 BPM | RESPIRATION RATE: 16 BRPM

## 2025-06-23 DIAGNOSIS — G62.9 NEUROPATHY: ICD-10-CM

## 2025-06-23 DIAGNOSIS — E11.9 TYPE 2 DIABETES MELLITUS WITHOUT COMPLICATION, WITHOUT LONG-TERM CURRENT USE OF INSULIN (HCC): Primary | ICD-10-CM

## 2025-06-23 DIAGNOSIS — I10 PRIMARY HYPERTENSION: ICD-10-CM

## 2025-06-23 DIAGNOSIS — E78.00 PURE HYPERCHOLESTEROLEMIA: ICD-10-CM

## 2025-06-23 PROCEDURE — 99212 OFFICE O/P EST SF 10 MIN: CPT

## 2025-06-23 PROCEDURE — 3074F SYST BP LT 130 MM HG: CPT

## 2025-06-23 PROCEDURE — 99213 OFFICE O/P EST LOW 20 MIN: CPT

## 2025-06-23 PROCEDURE — 3052F HG A1C>EQUAL 8.0%<EQUAL 9.0%: CPT

## 2025-06-23 PROCEDURE — 83036 HEMOGLOBIN GLYCOSYLATED A1C: CPT

## 2025-06-23 PROCEDURE — 3078F DIAST BP <80 MM HG: CPT

## 2025-06-23 PROCEDURE — PBSHW POCT GLYCOSYLATED HEMOGLOBIN (HGB A1C)

## 2025-06-23 RX ORDER — ATORVASTATIN CALCIUM 40 MG/1
TABLET, FILM COATED ORAL
Qty: 90 TABLET | Status: CANCELLED | OUTPATIENT
Start: 2025-06-23

## 2025-06-23 RX ORDER — LOSARTAN POTASSIUM AND HYDROCHLOROTHIAZIDE 25; 100 MG/1; MG/1
1 TABLET ORAL DAILY
Qty: 90 TABLET | Status: CANCELLED | OUTPATIENT
Start: 2025-06-23

## 2025-06-23 RX ORDER — GABAPENTIN 100 MG/1
100 CAPSULE ORAL 2 TIMES DAILY
Qty: 90 CAPSULE | Refills: 1 | Status: SHIPPED | OUTPATIENT
Start: 2025-06-23 | End: 2025-09-21

## 2025-06-23 RX ORDER — SITAGLIPTIN 100 MG/1
100 TABLET, FILM COATED ORAL DAILY
Qty: 90 TABLET | Refills: 3 | Status: SHIPPED | OUTPATIENT
Start: 2025-06-23

## 2025-06-23 RX ORDER — ATORVASTATIN CALCIUM 40 MG/1
TABLET, FILM COATED ORAL
Qty: 90 TABLET | Refills: 1 | Status: SHIPPED | OUTPATIENT
Start: 2025-06-23

## 2025-06-23 RX ORDER — SYRING-NEEDL,DISP,INSUL,0.3 ML 30 GX5/16"
SYRINGE, EMPTY DISPOSABLE MISCELLANEOUS
Qty: 100 EACH | Status: CANCELLED | OUTPATIENT
Start: 2025-06-23

## 2025-06-23 RX ORDER — LOSARTAN POTASSIUM AND HYDROCHLOROTHIAZIDE 25; 100 MG/1; MG/1
1 TABLET ORAL DAILY
Qty: 90 TABLET | Refills: 1 | Status: SHIPPED | OUTPATIENT
Start: 2025-06-23

## 2025-06-23 RX ORDER — FERROUS SULFATE 325(65) MG
325 TABLET ORAL EVERY OTHER DAY
Qty: 90 TABLET | Status: CANCELLED | OUTPATIENT
Start: 2025-06-23

## 2025-06-23 RX ORDER — FERROUS SULFATE 325(65) MG
325 TABLET ORAL EVERY OTHER DAY
Qty: 90 TABLET | Refills: 0 | Status: SHIPPED | OUTPATIENT
Start: 2025-06-23

## 2025-06-23 RX ORDER — BLOOD SUGAR DIAGNOSTIC
STRIP MISCELLANEOUS
Qty: 100 EACH | Status: CANCELLED | OUTPATIENT
Start: 2025-06-23

## 2025-06-23 RX ORDER — GABAPENTIN 100 MG/1
100 CAPSULE ORAL NIGHTLY
Qty: 90 CAPSULE | Status: CANCELLED | OUTPATIENT
Start: 2025-06-23 | End: 2025-12-20

## 2025-06-23 ASSESSMENT — LIFESTYLE VARIABLES
HOW OFTEN DO YOU HAVE A DRINK CONTAINING ALCOHOL: NEVER
HOW MANY STANDARD DRINKS CONTAINING ALCOHOL DO YOU HAVE ON A TYPICAL DAY: PATIENT DOES NOT DRINK

## 2025-06-23 NOTE — PATIENT INSTRUCTIONS
Dear Ness Dillard,        Thank you for coming to your appointment today. I hope we have addressed all of your needs.       Please make sure to do the following:  - Continue your medications as listed.  - We will see each other again in 3 months.    Call for a sooner appointment if you develop worsening of your symptoms.    Have a great day!        Sincerely,  Becky Rasheed MD  6/23/2025  10:17 AM

## 2025-06-23 NOTE — PROGRESS NOTES
[facility]  Internal Medicine Residency Clinic    Attending Physician Statement  I have discussed the case, including pertinent history and exam findings with the resident physician.  I agree with the assessment, plan and orders as documented by the resident. I have reviewed all pertinent PMHx, PSHx, FamHx, SocialHx, medications, and allergies and updated history as appropriate.    Patient here for routine follow up of medical problems.     Neuropathy   -2/2 hx of uncontrolled DM   -worsening pain in toes and feet especially in AM and when walking   -non ischemic in nature   -increasing her gabapentin for control of symptoms     NIDDM2  -A1c improved to 7.3 with current treatment   -blood glucose 140s   -no hypoglycemia  -plan to have patient go to see her ophthalmologist 2/2 worsening symptoms   -plan to increase metformin to 1000 mg in AM and 500 mg at night   -screening testing and orders   -screening referrals     Remainder of medical problems as per resident note.    Robert Herman Jr, DO  6/23/25

## 2025-06-23 NOTE — ASSESSMENT & PLAN NOTE
Chronic, at goal (stable), continue current treatment plan  HTN  Home BP: does not regularly take /70  Clinic BP: 125/73  Hypotension/orthostatic: none  Regimen: Losartan-Hydrochlorothiazide 100-25 mg tab daily  Orders:    losartan-hydroCHLOROthiazide (HYZAAR) 100-25 MG per tablet; Take 1 tablet by mouth daily

## 2025-06-23 NOTE — PROGRESS NOTES
Ness Dillard (:  1975) is a 50 y.o. female,Established patient, here for evaluation of the following chief complaint(s):  No chief complaint on file.       Assessment & Plan  Type 2 diabetes mellitus without complication, without long-term current use of insulin (HCC)   Chronic, at goal (stable), continue current treatment plan  Diabetes mellitus type 2  HbA1c: 2025 8.2 > 25 7.3   Regimen: Metformin 500 mg BID, Januvia 100 mg daily  Morning B-145 only checks in once in the morning  Hypoglycemia: no reported symptoms  Eye exam: 2024 - pt will make appointment with ophthalmologist  Foot exam/Monofilament: follows w/ podiatrist - encouraged to make an appointment  Microalb/Cr: Alb 19/Cr 135.6/ Ratio 14  Lipid profile: 3/25/2025 Total 140, HDL 51, LDL 60, Trigly 147  BP: 125/73  Discussed with patient about increasing dose of Metformin but she reportedly cannot tolerate Metformin 1,000 mg; continue current regimen for now  Orders:    atorvastatin (LIPITOR) 40 MG tablet; TAKE 1 TABLET BY MOUTH ONE TIME A DAY    gabapentin (NEURONTIN) 100 MG capsule; Take 1 capsule by mouth in the morning and at bedtime for 90 days. Intended supply: 90 days    JANUVIA 100 MG tablet; Take 1 tablet by mouth daily PAP    metFORMIN (GLUCOPHAGE) 500 MG tablet; Take 1 tablet by mouth 2 times daily (with meals) with meals    POCT glycosylated hemoglobin (Hb A1C)    Primary hypertension   Chronic, at goal (stable), continue current treatment plan  HTN  Home BP: does not regularly take /70  Clinic BP: 125/73  Hypotension/orthostatic: none  Regimen: Losartan-Hydrochlorothiazide 100-25 mg tab daily  Orders:    losartan-hydroCHLOROthiazide (HYZAAR) 100-25 MG per tablet; Take 1 tablet by mouth daily    Pure hypercholesterolemia   Chronic, at goal (stable), continue current treatment plan  HLD  3/25/2025 Total 140, HDL 51, LDL 60, Trigly 147  Regimen: Atorvastain 40 mg tab daily  The 10-year ASCVD risk score

## 2025-06-23 NOTE — ASSESSMENT & PLAN NOTE
Chronic, at goal (stable), continue current treatment plan  HLD  3/25/2025 Total 140, HDL 51, LDL 60, Trigly 147  Regimen: Atorvastain 40 mg tab daily  The 10-year ASCVD risk score (Zabrina VARELA, et al., 2019) is: 2.8%    Values used to calculate the score:      Age: 50 years      Sex: Female      Is Non- : No      Diabetic: Yes      Tobacco smoker: No      Systolic Blood Pressure: 131 mmHg      Is BP treated: Yes      HDL Cholesterol: 51 mg/dL      Total Cholesterol: 140 mg/dL

## 2025-06-23 NOTE — ASSESSMENT & PLAN NOTE
Chronic, at goal (stable), continue current treatment plan  Diabetes mellitus type 2  HbA1c: 2025 8.2 > 25 7.3   Regimen: Metformin 500 mg BID, Januvia 100 mg daily  Morning B-145 only checks in once in the morning  Hypoglycemia: no reported symptoms  Eye exam: 2024 - pt will make appointment with ophthalmologist  Foot exam/Monofilament: follows w/ podiatrist - encouraged to make an appointment  Microalb/Cr: Alb 19/Cr 135.6/ Ratio 14  Lipid profile: 3/25/2025 Total 140, HDL 51, LDL 60, Trigly 147  BP: 125/73  Discussed with patient about increasing dose of Metformin but she reportedly cannot tolerate Metformin 1,000 mg; continue current regimen for now  Orders:    atorvastatin (LIPITOR) 40 MG tablet; TAKE 1 TABLET BY MOUTH ONE TIME A DAY    gabapentin (NEURONTIN) 100 MG capsule; Take 1 capsule by mouth in the morning and at bedtime for 90 days. Intended supply: 90 days    JANUVIA 100 MG tablet; Take 1 tablet by mouth daily PAP    metFORMIN (GLUCOPHAGE) 500 MG tablet; Take 1 tablet by mouth 2 times daily (with meals) with meals    POCT glycosylated hemoglobin (Hb A1C)

## 2025-09-06 ENCOUNTER — HOSPITAL ENCOUNTER (EMERGENCY)
Age: 50
Discharge: HOME | End: 2025-09-06
Attending: EMERGENCY MEDICINE

## 2025-09-06 ENCOUNTER — APPOINTMENT (OUTPATIENT)
Dept: GENERAL RADIOLOGY | Age: 50
End: 2025-09-06

## 2025-09-06 VITALS
SYSTOLIC BLOOD PRESSURE: 126 MMHG | WEIGHT: 140 LBS | BODY MASS INDEX: 27.48 KG/M2 | DIASTOLIC BLOOD PRESSURE: 64 MMHG | HEART RATE: 81 BPM | TEMPERATURE: 97.9 F | HEIGHT: 60 IN | RESPIRATION RATE: 16 BRPM | OXYGEN SATURATION: 96 %

## 2025-09-06 DIAGNOSIS — R05.9 COUGH, UNSPECIFIED TYPE: ICD-10-CM

## 2025-09-06 DIAGNOSIS — R07.9 CHEST PAIN, UNSPECIFIED TYPE: Primary | ICD-10-CM

## 2025-09-06 LAB
ALBUMIN SERPL-MCNC: 4.3 G/DL (ref 3.5–5.2)
ALP SERPL-CCNC: 70 U/L (ref 35–104)
ALT SERPL-CCNC: 22 U/L (ref 0–35)
ANION GAP SERPL CALCULATED.3IONS-SCNC: 14 MMOL/L (ref 7–16)
AST SERPL-CCNC: 24 U/L (ref 0–35)
BASOPHILS # BLD: 0.05 K/UL (ref 0–0.2)
BASOPHILS NFR BLD: 1 % (ref 0–2)
BILIRUB SERPL-MCNC: 0.6 MG/DL (ref 0–1.2)
BNP SERPL-MCNC: <36 PG/ML (ref 0–125)
BUN SERPL-MCNC: 9 MG/DL (ref 6–20)
CALCIUM SERPL-MCNC: 9.8 MG/DL (ref 8.6–10)
CHLORIDE SERPL-SCNC: 99 MMOL/L (ref 98–107)
CO2 SERPL-SCNC: 25 MMOL/L (ref 22–29)
CREAT SERPL-MCNC: 0.5 MG/DL (ref 0.5–1)
D-DIMER QUANTITATIVE: <200 NG/ML DDU (ref 0–230)
EOSINOPHIL # BLD: 0.38 K/UL (ref 0.05–0.5)
EOSINOPHILS RELATIVE PERCENT: 5 % (ref 0–6)
ERYTHROCYTE [DISTWIDTH] IN BLOOD BY AUTOMATED COUNT: 15.9 % (ref 11.5–15)
GFR, ESTIMATED: >90 ML/MIN/1.73M2
GLUCOSE SERPL-MCNC: 201 MG/DL (ref 74–99)
HCT VFR BLD AUTO: 32.9 % (ref 34–48)
HGB BLD-MCNC: 11.3 G/DL (ref 11.5–15.5)
IMM GRANULOCYTES # BLD AUTO: 0.03 K/UL (ref 0–0.58)
IMM GRANULOCYTES NFR BLD: 0 % (ref 0–5)
LYMPHOCYTES NFR BLD: 3.09 K/UL (ref 1.5–4)
LYMPHOCYTES RELATIVE PERCENT: 40 % (ref 20–42)
MAGNESIUM SERPL-MCNC: 1.2 MG/DL (ref 1.6–2.6)
MCH RBC QN AUTO: 27.8 PG (ref 26–35)
MCHC RBC AUTO-ENTMCNC: 34.3 G/DL (ref 32–34.5)
MCV RBC AUTO: 80.8 FL (ref 80–99.9)
MONOCYTES NFR BLD: 0.53 K/UL (ref 0.1–0.95)
MONOCYTES NFR BLD: 7 % (ref 2–12)
NEUTROPHILS NFR BLD: 48 % (ref 43–80)
NEUTS SEG NFR BLD: 3.7 K/UL (ref 1.8–7.3)
PLATELET # BLD AUTO: 318 K/UL (ref 130–450)
PMV BLD AUTO: 10.3 FL (ref 7–12)
POTASSIUM SERPL-SCNC: 3.6 MMOL/L (ref 3.5–5.1)
PROT SERPL-MCNC: 7.5 G/DL (ref 6.4–8.3)
RBC # BLD AUTO: 4.07 M/UL (ref 3.5–5.5)
SODIUM SERPL-SCNC: 138 MMOL/L (ref 136–145)
TROPONIN I SERPL HS-MCNC: <6 NG/L (ref 0–14)
WBC OTHER # BLD: 7.8 K/UL (ref 4.5–11.5)

## 2025-09-06 PROCEDURE — 83735 ASSAY OF MAGNESIUM: CPT

## 2025-09-06 PROCEDURE — 84484 ASSAY OF TROPONIN QUANT: CPT

## 2025-09-06 PROCEDURE — 6360000002 HC RX W HCPCS: Performed by: EMERGENCY MEDICINE

## 2025-09-06 PROCEDURE — 85379 FIBRIN DEGRADATION QUANT: CPT

## 2025-09-06 PROCEDURE — 85025 COMPLETE CBC W/AUTO DIFF WBC: CPT

## 2025-09-06 PROCEDURE — 83880 ASSAY OF NATRIURETIC PEPTIDE: CPT

## 2025-09-06 PROCEDURE — 2580000003 HC RX 258: Performed by: EMERGENCY MEDICINE

## 2025-09-06 PROCEDURE — 6370000000 HC RX 637 (ALT 250 FOR IP): Performed by: EMERGENCY MEDICINE

## 2025-09-06 PROCEDURE — 80053 COMPREHEN METABOLIC PANEL: CPT

## 2025-09-06 PROCEDURE — 71045 X-RAY EXAM CHEST 1 VIEW: CPT

## 2025-09-06 RX ORDER — AZITHROMYCIN 500 MG/1
500 TABLET, FILM COATED ORAL DAILY
Qty: 3 TABLET | Refills: 0 | Status: SHIPPED | OUTPATIENT
Start: 2025-09-06 | End: 2025-09-06

## 2025-09-06 RX ORDER — BENZONATATE 100 MG/1
100 CAPSULE ORAL ONCE
Status: COMPLETED | OUTPATIENT
Start: 2025-09-06 | End: 2025-09-06

## 2025-09-06 RX ORDER — BENZONATATE 100 MG/1
100 CAPSULE ORAL 3 TIMES DAILY PRN
Qty: 30 CAPSULE | Refills: 0 | Status: SHIPPED | OUTPATIENT
Start: 2025-09-06 | End: 2025-09-16

## 2025-09-06 RX ORDER — AZITHROMYCIN 500 MG/1
500 TABLET, FILM COATED ORAL DAILY
Qty: 3 TABLET | Refills: 0 | Status: SHIPPED | OUTPATIENT
Start: 2025-09-06 | End: 2025-09-09

## 2025-09-06 RX ORDER — PREDNISONE 50 MG/1
50 TABLET ORAL DAILY
Qty: 5 TABLET | Refills: 0 | Status: SHIPPED | OUTPATIENT
Start: 2025-09-06 | End: 2025-09-06

## 2025-09-06 RX ORDER — 0.9 % SODIUM CHLORIDE 0.9 %
1000 INTRAVENOUS SOLUTION INTRAVENOUS ONCE
Status: COMPLETED | OUTPATIENT
Start: 2025-09-06 | End: 2025-09-06

## 2025-09-06 RX ORDER — MAGNESIUM SULFATE IN WATER 40 MG/ML
2000 INJECTION, SOLUTION INTRAVENOUS ONCE
Status: COMPLETED | OUTPATIENT
Start: 2025-09-06 | End: 2025-09-06

## 2025-09-06 RX ORDER — PREDNISONE 50 MG/1
50 TABLET ORAL DAILY
Qty: 5 TABLET | Refills: 0 | Status: SHIPPED | OUTPATIENT
Start: 2025-09-06 | End: 2025-09-11

## 2025-09-06 RX ORDER — BENZONATATE 100 MG/1
100 CAPSULE ORAL 3 TIMES DAILY PRN
Qty: 30 CAPSULE | Refills: 0 | Status: SHIPPED | OUTPATIENT
Start: 2025-09-06 | End: 2025-09-06

## 2025-09-06 RX ORDER — PREDNISONE 20 MG/1
60 TABLET ORAL ONCE
Status: COMPLETED | OUTPATIENT
Start: 2025-09-06 | End: 2025-09-06

## 2025-09-06 RX ADMIN — BENZONATATE 100 MG: 100 CAPSULE ORAL at 16:40

## 2025-09-06 RX ADMIN — SODIUM CHLORIDE 1000 ML: 0.9 INJECTION, SOLUTION INTRAVENOUS at 16:42

## 2025-09-06 RX ADMIN — MAGNESIUM SULFATE HEPTAHYDRATE 2000 MG: 40 INJECTION, SOLUTION INTRAVENOUS at 18:15

## 2025-09-06 RX ADMIN — PREDNISONE 60 MG: 20 TABLET ORAL at 16:40

## 2025-09-06 ASSESSMENT — PAIN - FUNCTIONAL ASSESSMENT: PAIN_FUNCTIONAL_ASSESSMENT: PREVENTS OR INTERFERES SOME ACTIVE ACTIVITIES AND ADLS

## 2025-09-06 ASSESSMENT — PAIN DESCRIPTION - DESCRIPTORS: DESCRIPTORS: SHARP

## 2025-09-06 ASSESSMENT — PAIN DESCRIPTION - PAIN TYPE: TYPE: ACUTE PAIN

## 2025-09-06 ASSESSMENT — PAIN DESCRIPTION - FREQUENCY: FREQUENCY: CONTINUOUS

## 2025-09-06 ASSESSMENT — PAIN DESCRIPTION - LOCATION: LOCATION: CHEST

## 2025-09-06 ASSESSMENT — PAIN SCALES - GENERAL: PAINLEVEL_OUTOF10: 6

## (undated) DEVICE — TOWEL,OR,DSP,ST,BLUE,STD,6/PK,12PK/CS: Brand: MEDLINE

## (undated) DEVICE — SET FLD COLL CLR W/ BLT IN WARN SYS FOR HYSTSCP DOLPHIN

## (undated) DEVICE — 4-PORT MANIFOLD: Brand: NEPTUNE 2

## (undated) DEVICE — SOCK SPEC L9IN WHT UNIV W/ STD PRT FOR FLD MGMT

## (undated) DEVICE — DOUBLE BASIN SET: Brand: MEDLINE INDUSTRIES, INC.

## (undated) DEVICE — FORCEPS BX L160CM JAW DIA2.4MM YEL L CAP W/ NDL DISP RAD

## (undated) DEVICE — TRAY SET D

## (undated) DEVICE — Z DISCONTINUED NO SUB IDED TUBING ETCO2 AD L6.5FT NSL ORAL CVD PRNG NONFLARED TIP OVR

## (undated) DEVICE — PAD,NON-ADHERENT,2X3,STERILE,LF,1/PK: Brand: MEDLINE

## (undated) DEVICE — Y-TYPE TUR/BLADDER IRRIGATION SET, REGULATING CLAMP

## (undated) DEVICE — MARKER,SKIN,WI/RULER AND LABELS: Brand: MEDLINE

## (undated) DEVICE — GLOVE SURG SZ 6 THK91MIL LTX FREE SYN POLYISOPRENE ANTI

## (undated) DEVICE — DEFENDO AIR WATER SUCTION AND BIOPSY VALVE KIT FOR  OLYMPUS: Brand: DEFENDO AIR/WATER/SUCTION AND BIOPSY VALVE

## (undated) DEVICE — SOLUTION IV IRRIG POUR BRL 0.9% SODIUM CHL 2F7124

## (undated) DEVICE — GLOVE SURG SZ 6 L12IN FNGR THK94MIL TRNSLUC YEL LTX HYDRGEL

## (undated) DEVICE — CONTAINER SPEC 60ML PH 7NEUTRAL BUFF FRMLN RDY TO USE

## (undated) DEVICE — BAG PRSS INFUS IV OR ART 3000 CC W STPCOCK NO THUMBWHEEL W

## (undated) DEVICE — VAGINAL PREP TRAY: Brand: MEDLINE INDUSTRIES, INC.

## (undated) DEVICE — DEVICE TISS REM IU CANSTR VAC TB FT PEDAL DISPOSABLE MYOSURE

## (undated) DEVICE — PAD,SANITARY,11 IN,MAXI,N-STRL,IND WRAP: Brand: MEDLINE

## (undated) DEVICE — CONNECTOR IRRIGATION AUXILIARY H2O JET W/ PRT MTL THRD HYDR

## (undated) DEVICE — COVER,LIGHT HANDLE,FLX,2/PK: Brand: MEDLINE INDUSTRIES, INC.

## (undated) DEVICE — BITEBLOCK 54FR W/ DENT RIM BLOX

## (undated) DEVICE — LEGGINGS, PAIR, 31X48, STERILE: Brand: MEDLINE

## (undated) DEVICE — GAUZE,SPONGE,4"X4",16PLY,XRAY,STRL,LF: Brand: MEDLINE

## (undated) DEVICE — DRAPE,REIN 53X77,STERILE: Brand: MEDLINE

## (undated) DEVICE — GAUZE,SPONGE,POST-OP,4X3,STRL,LF: Brand: MEDLINE

## (undated) DEVICE — SOLUTION IRRIG 3000ML 0.9% SOD CHL USP UROMATIC PLAS CONT